# Patient Record
Sex: FEMALE | Race: WHITE | NOT HISPANIC OR LATINO | Employment: UNEMPLOYED | URBAN - METROPOLITAN AREA
[De-identification: names, ages, dates, MRNs, and addresses within clinical notes are randomized per-mention and may not be internally consistent; named-entity substitution may affect disease eponyms.]

---

## 2017-04-29 ENCOUNTER — HOSPITAL ENCOUNTER (EMERGENCY)
Facility: HOSPITAL | Age: 57
Discharge: HOME | End: 2017-04-29
Attending: EMERGENCY MEDICINE | Admitting: EMERGENCY MEDICINE

## 2017-04-29 VITALS
RESPIRATION RATE: 18 BRPM | WEIGHT: 163 LBS | HEART RATE: 79 BPM | BODY MASS INDEX: 28.88 KG/M2 | DIASTOLIC BLOOD PRESSURE: 58 MMHG | SYSTOLIC BLOOD PRESSURE: 98 MMHG | OXYGEN SATURATION: 100 % | HEIGHT: 63 IN | TEMPERATURE: 98.8 F

## 2017-04-29 DIAGNOSIS — I73.9 PERIPHERAL ARTERIAL DISEASE (HCC): Primary | ICD-10-CM

## 2017-04-29 DIAGNOSIS — I73.9 CLAUDICATION IN PERIPHERAL VASCULAR DISEASE (HCC): ICD-10-CM

## 2017-04-29 LAB
ALBUMIN SERPL-MCNC: 2.8 G/DL (ref 3.5–5.2)
ALBUMIN/GLOB SERPL: 0.6 G/DL
ALP SERPL-CCNC: 65 U/L (ref 40–129)
ALT SERPL W P-5'-P-CCNC: 28 U/L (ref 5–33)
ANION GAP SERPL CALCULATED.3IONS-SCNC: 10.3 MMOL/L
AST SERPL-CCNC: 70 U/L (ref 5–32)
BASOPHILS # BLD AUTO: 0.05 10*3/MM3 (ref 0–0.2)
BASOPHILS NFR BLD AUTO: 0.5 % (ref 0–2)
BILIRUB SERPL-MCNC: 0.7 MG/DL (ref 0.2–1.2)
BUN BLD-MCNC: 22 MG/DL (ref 6–20)
BUN/CREAT SERPL: 25 (ref 7–25)
CALCIUM SPEC-SCNC: 9 MG/DL (ref 8.6–10.5)
CHLORIDE SERPL-SCNC: 91 MMOL/L (ref 98–107)
CO2 SERPL-SCNC: 33.7 MMOL/L (ref 22–29)
CREAT BLD-MCNC: 0.88 MG/DL (ref 0.57–1)
DEPRECATED RDW RBC AUTO: 51 FL (ref 37–54)
EOSINOPHIL # BLD AUTO: 0.03 10*3/MM3 (ref 0.1–0.3)
EOSINOPHIL NFR BLD AUTO: 0.3 % (ref 0–4)
ERYTHROCYTE [DISTWIDTH] IN BLOOD BY AUTOMATED COUNT: 17.9 % (ref 11.5–14.5)
GFR SERPL CREATININE-BSD FRML MDRD: 66 ML/MIN/1.73
GLOBULIN UR ELPH-MCNC: 4.6 GM/DL
GLUCOSE BLD-MCNC: 169 MG/DL (ref 65–99)
HCT VFR BLD AUTO: 31 % (ref 37–47)
HGB BLD-MCNC: 9 G/DL (ref 12–16)
IMM GRANULOCYTES # BLD: 0.04 10*3/MM3 (ref 0–0.03)
IMM GRANULOCYTES NFR BLD: 0.4 % (ref 0–0.5)
LYMPHOCYTES # BLD AUTO: 1.77 10*3/MM3 (ref 0.6–4.8)
LYMPHOCYTES NFR BLD AUTO: 16.9 % (ref 20–45)
MCH RBC QN AUTO: 22.8 PG (ref 27–31)
MCHC RBC AUTO-ENTMCNC: 29 G/DL (ref 31–37)
MCV RBC AUTO: 78.5 FL (ref 81–99)
MONOCYTES # BLD AUTO: 0.88 10*3/MM3 (ref 0–1)
MONOCYTES NFR BLD AUTO: 8.4 % (ref 3–8)
NEUTROPHILS # BLD AUTO: 7.7 10*3/MM3 (ref 1.5–8.3)
NEUTROPHILS NFR BLD AUTO: 73.5 % (ref 45–70)
NRBC BLD MANUAL-RTO: 0 /100 WBC (ref 0–0)
PLATELET # BLD AUTO: 293 10*3/MM3 (ref 140–500)
PMV BLD AUTO: 10.7 FL (ref 7.4–10.4)
POTASSIUM BLD-SCNC: 4.4 MMOL/L (ref 3.5–5.2)
PROT SERPL-MCNC: 7.4 G/DL (ref 6–8.5)
RBC # BLD AUTO: 3.95 10*6/MM3 (ref 4.2–5.4)
SODIUM BLD-SCNC: 135 MMOL/L (ref 136–145)
WBC NRBC COR # BLD: 10.47 10*3/MM3 (ref 4.8–10.8)

## 2017-04-29 PROCEDURE — 93010 ELECTROCARDIOGRAM REPORT: CPT | Performed by: INTERNAL MEDICINE

## 2017-04-29 PROCEDURE — 99285 EMERGENCY DEPT VISIT HI MDM: CPT | Performed by: EMERGENCY MEDICINE

## 2017-04-29 PROCEDURE — 93005 ELECTROCARDIOGRAM TRACING: CPT | Performed by: EMERGENCY MEDICINE

## 2017-04-29 PROCEDURE — 99283 EMERGENCY DEPT VISIT LOW MDM: CPT

## 2017-04-29 PROCEDURE — 80053 COMPREHEN METABOLIC PANEL: CPT | Performed by: EMERGENCY MEDICINE

## 2017-04-29 PROCEDURE — 85025 COMPLETE CBC W/AUTO DIFF WBC: CPT | Performed by: EMERGENCY MEDICINE

## 2017-04-29 RX ORDER — SACCHAROMYCES BOULARDII 250 MG
250 CAPSULE ORAL 2 TIMES DAILY
COMMUNITY

## 2017-04-29 RX ORDER — ASPIRIN 81 MG/1
81 TABLET, CHEWABLE ORAL DAILY
COMMUNITY

## 2017-04-29 RX ORDER — LISINOPRIL 2.5 MG/1
2.5 TABLET ORAL DAILY
COMMUNITY
End: 2022-10-02 | Stop reason: HOSPADM

## 2017-04-29 RX ORDER — MULTIPLE VITAMINS W/ MINERALS TAB 9MG-400MCG
1 TAB ORAL DAILY
COMMUNITY

## 2017-04-29 RX ORDER — PANTOPRAZOLE SODIUM 40 MG/1
40 TABLET, DELAYED RELEASE ORAL DAILY
COMMUNITY

## 2017-04-29 RX ORDER — SODIUM CHLORIDE 0.9 % (FLUSH) 0.9 %
10 SYRINGE (ML) INJECTION AS NEEDED
Status: DISCONTINUED | OUTPATIENT
Start: 2017-04-29 | End: 2017-04-29 | Stop reason: HOSPADM

## 2017-04-29 RX ORDER — ESCITALOPRAM OXALATE 5 MG/1
5 TABLET ORAL DAILY
COMMUNITY
End: 2022-10-02 | Stop reason: HOSPADM

## 2017-04-29 RX ORDER — FUROSEMIDE 40 MG/1
40 TABLET ORAL 2 TIMES DAILY
COMMUNITY
End: 2022-10-02 | Stop reason: HOSPADM

## 2017-04-29 RX ORDER — DOCUSATE SODIUM 100 MG/1
100 CAPSULE, LIQUID FILLED ORAL 2 TIMES DAILY
COMMUNITY

## 2017-04-29 RX ORDER — CARVEDILOL 3.12 MG/1
3.12 TABLET ORAL 2 TIMES DAILY WITH MEALS
COMMUNITY

## 2017-04-29 RX ORDER — GABAPENTIN 400 MG/1
400 CAPSULE ORAL 3 TIMES DAILY
COMMUNITY
End: 2022-11-16

## 2017-04-29 RX ORDER — METOLAZONE 2.5 MG/1
2.5 TABLET ORAL DAILY
COMMUNITY
End: 2022-10-02 | Stop reason: HOSPADM

## 2017-04-29 RX ORDER — SENNOSIDES 8.6 MG
TABLET ORAL NIGHTLY
COMMUNITY

## 2017-04-29 RX ORDER — ONDANSETRON 4 MG/1
4 TABLET, FILM COATED ORAL EVERY 8 HOURS PRN
COMMUNITY

## 2017-04-29 RX ORDER — ATORVASTATIN CALCIUM 20 MG/1
20 TABLET, FILM COATED ORAL DAILY
COMMUNITY

## 2017-04-29 RX ORDER — OXYCODONE HYDROCHLORIDE AND ACETAMINOPHEN 5; 325 MG/1; MG/1
1 TABLET ORAL EVERY 6 HOURS PRN
COMMUNITY
End: 2022-10-19 | Stop reason: SDDI

## 2017-04-29 RX ORDER — POLYETHYLENE GLYCOL 3350 17 G/17G
17 POWDER, FOR SOLUTION ORAL DAILY
COMMUNITY
End: 2022-10-19 | Stop reason: SDDI

## 2017-04-29 RX ORDER — ACETAMINOPHEN 325 MG/1
650 TABLET ORAL EVERY 6 HOURS PRN
COMMUNITY

## 2022-08-13 ENCOUNTER — HOSPITAL ENCOUNTER (EMERGENCY)
Facility: HOSPITAL | Age: 62
Discharge: HOME | End: 2022-08-14
Attending: EMERGENCY MEDICINE | Admitting: EMERGENCY MEDICINE

## 2022-08-13 ENCOUNTER — APPOINTMENT (OUTPATIENT)
Dept: GENERAL RADIOLOGY | Facility: HOSPITAL | Age: 62
End: 2022-08-13

## 2022-08-13 VITALS
HEIGHT: 55 IN | SYSTOLIC BLOOD PRESSURE: 139 MMHG | BODY MASS INDEX: 39.11 KG/M2 | TEMPERATURE: 99.2 F | RESPIRATION RATE: 20 BRPM | OXYGEN SATURATION: 90 % | DIASTOLIC BLOOD PRESSURE: 75 MMHG | WEIGHT: 169 LBS | HEART RATE: 98 BPM

## 2022-08-13 DIAGNOSIS — U07.1 COVID-19: Primary | ICD-10-CM

## 2022-08-13 DIAGNOSIS — R53.1 WEAKNESS: ICD-10-CM

## 2022-08-13 LAB
ALBUMIN SERPL-MCNC: 3.5 G/DL (ref 3.5–5.2)
ALBUMIN/GLOB SERPL: 1 G/DL
ALP SERPL-CCNC: 92 U/L (ref 39–117)
ALT SERPL W P-5'-P-CCNC: 19 U/L (ref 1–33)
AMORPH URATE CRY URNS QL MICRO: ABNORMAL /HPF
ANION GAP SERPL CALCULATED.3IONS-SCNC: 9.5 MMOL/L (ref 5–15)
AST SERPL-CCNC: 30 U/L (ref 1–32)
BACTERIA UR QL AUTO: ABNORMAL /HPF
BASOPHILS # BLD AUTO: 0.1 10*3/MM3 (ref 0–0.2)
BASOPHILS NFR BLD AUTO: 0.9 % (ref 0–1.5)
BILIRUB SERPL-MCNC: 0.4 MG/DL (ref 0–1.2)
BILIRUB UR QL STRIP: NEGATIVE
BUN SERPL-MCNC: 16 MG/DL (ref 8–23)
BUN/CREAT SERPL: 15.7 (ref 7–25)
CALCIUM SPEC-SCNC: 9.4 MG/DL (ref 8.6–10.5)
CHLORIDE SERPL-SCNC: 100 MMOL/L (ref 98–107)
CLARITY UR: CLEAR
CO2 SERPL-SCNC: 25.5 MMOL/L (ref 22–29)
COLOR UR: YELLOW
CREAT SERPL-MCNC: 1.02 MG/DL (ref 0.57–1)
DEPRECATED RDW RBC AUTO: 50.4 FL (ref 37–54)
EGFRCR SERPLBLD CKD-EPI 2021: 62.7 ML/MIN/1.73
EOSINOPHIL # BLD AUTO: 0.05 10*3/MM3 (ref 0–0.4)
EOSINOPHIL NFR BLD AUTO: 0.4 % (ref 0.3–6.2)
ERYTHROCYTE [DISTWIDTH] IN BLOOD BY AUTOMATED COUNT: 14.6 % (ref 12.3–15.4)
FLUAV RNA RESP QL NAA+PROBE: NOT DETECTED
FLUBV RNA RESP QL NAA+PROBE: NOT DETECTED
GLOBULIN UR ELPH-MCNC: 3.5 GM/DL
GLUCOSE SERPL-MCNC: 193 MG/DL (ref 65–99)
GLUCOSE UR STRIP-MCNC: NEGATIVE MG/DL
HCT VFR BLD AUTO: 45.4 % (ref 34–46.6)
HGB BLD-MCNC: 14.5 G/DL (ref 12–15.9)
HGB UR QL STRIP.AUTO: ABNORMAL
HYALINE CASTS UR QL AUTO: ABNORMAL /LPF
IMM GRANULOCYTES # BLD AUTO: 0.08 10*3/MM3 (ref 0–0.05)
IMM GRANULOCYTES NFR BLD AUTO: 0.7 % (ref 0–0.5)
KETONES UR QL STRIP: NEGATIVE
LEUKOCYTE ESTERASE UR QL STRIP.AUTO: NEGATIVE
LYMPHOCYTES # BLD AUTO: 1.27 10*3/MM3 (ref 0.7–3.1)
LYMPHOCYTES NFR BLD AUTO: 10.9 % (ref 19.6–45.3)
MCH RBC QN AUTO: 29.9 PG (ref 26.6–33)
MCHC RBC AUTO-ENTMCNC: 31.9 G/DL (ref 31.5–35.7)
MCV RBC AUTO: 93.6 FL (ref 79–97)
MONOCYTES # BLD AUTO: 0.91 10*3/MM3 (ref 0.1–0.9)
MONOCYTES NFR BLD AUTO: 7.8 % (ref 5–12)
MUCOUS THREADS URNS QL MICRO: ABNORMAL /HPF
NEUTROPHILS NFR BLD AUTO: 79.3 % (ref 42.7–76)
NEUTROPHILS NFR BLD AUTO: 9.25 10*3/MM3 (ref 1.7–7)
NITRITE UR QL STRIP: NEGATIVE
NRBC BLD AUTO-RTO: 0 /100 WBC (ref 0–0.2)
PH UR STRIP.AUTO: 5.5 [PH] (ref 4.5–8)
PLATELET # BLD AUTO: 324 10*3/MM3 (ref 140–450)
PMV BLD AUTO: 10.6 FL (ref 6–12)
POTASSIUM SERPL-SCNC: 4.5 MMOL/L (ref 3.5–5.2)
PROT SERPL-MCNC: 7 G/DL (ref 6–8.5)
PROT UR QL STRIP: ABNORMAL
RBC # BLD AUTO: 4.85 10*6/MM3 (ref 3.77–5.28)
RBC # UR STRIP: ABNORMAL /HPF
REF LAB TEST METHOD: ABNORMAL
SARS-COV-2 RNA RESP QL NAA+PROBE: DETECTED
SODIUM SERPL-SCNC: 135 MMOL/L (ref 136–145)
SP GR UR STRIP: 1.02 (ref 1–1.03)
SQUAMOUS #/AREA URNS HPF: ABNORMAL /HPF
UROBILINOGEN UR QL STRIP: ABNORMAL
WBC # UR STRIP: ABNORMAL /HPF
WBC NRBC COR # BLD: 11.66 10*3/MM3 (ref 3.4–10.8)

## 2022-08-13 PROCEDURE — 25010000002 DEXAMETHASONE PER 1 MG: Performed by: EMERGENCY MEDICINE

## 2022-08-13 PROCEDURE — 80053 COMPREHEN METABOLIC PANEL: CPT | Performed by: EMERGENCY MEDICINE

## 2022-08-13 PROCEDURE — 81001 URINALYSIS AUTO W/SCOPE: CPT | Performed by: EMERGENCY MEDICINE

## 2022-08-13 PROCEDURE — P9612 CATHETERIZE FOR URINE SPEC: HCPCS

## 2022-08-13 PROCEDURE — 25010000002 ONDANSETRON PER 1 MG: Performed by: EMERGENCY MEDICINE

## 2022-08-13 PROCEDURE — 87636 SARSCOV2 & INF A&B AMP PRB: CPT | Performed by: EMERGENCY MEDICINE

## 2022-08-13 PROCEDURE — 96375 TX/PRO/DX INJ NEW DRUG ADDON: CPT

## 2022-08-13 PROCEDURE — 85025 COMPLETE CBC W/AUTO DIFF WBC: CPT | Performed by: EMERGENCY MEDICINE

## 2022-08-13 PROCEDURE — 96374 THER/PROPH/DIAG INJ IV PUSH: CPT

## 2022-08-13 PROCEDURE — 99283 EMERGENCY DEPT VISIT LOW MDM: CPT

## 2022-08-13 PROCEDURE — 71045 X-RAY EXAM CHEST 1 VIEW: CPT

## 2022-08-13 RX ORDER — BENZONATATE 100 MG/1
100 CAPSULE ORAL 2 TIMES DAILY PRN
Qty: 15 CAPSULE | Refills: 0 | Status: SHIPPED | OUTPATIENT
Start: 2022-08-13 | End: 2022-08-20

## 2022-08-13 RX ORDER — DEXAMETHASONE SODIUM PHOSPHATE 4 MG/ML
6 INJECTION, SOLUTION INTRA-ARTICULAR; INTRALESIONAL; INTRAMUSCULAR; INTRAVENOUS; SOFT TISSUE ONCE
Status: COMPLETED | OUTPATIENT
Start: 2022-08-13 | End: 2022-08-13

## 2022-08-13 RX ORDER — ONDANSETRON 4 MG/1
4 TABLET, ORALLY DISINTEGRATING ORAL EVERY 8 HOURS PRN
Qty: 20 TABLET | Refills: 0 | Status: SHIPPED | OUTPATIENT
Start: 2022-08-13 | End: 2022-08-20

## 2022-08-13 RX ORDER — DEXAMETHASONE 6 MG/1
6 TABLET ORAL
Qty: 5 TABLET | Refills: 0 | Status: SHIPPED | OUTPATIENT
Start: 2022-08-13 | End: 2022-08-18

## 2022-08-13 RX ORDER — ONDANSETRON 2 MG/ML
4 INJECTION INTRAMUSCULAR; INTRAVENOUS ONCE
Status: COMPLETED | OUTPATIENT
Start: 2022-08-13 | End: 2022-08-13

## 2022-08-13 RX ORDER — SODIUM CHLORIDE 0.9 % (FLUSH) 0.9 %
10 SYRINGE (ML) INJECTION AS NEEDED
Status: DISCONTINUED | OUTPATIENT
Start: 2022-08-13 | End: 2022-08-14 | Stop reason: HOSPADM

## 2022-08-13 RX ADMIN — SODIUM CHLORIDE 500 ML: 9 INJECTION, SOLUTION INTRAVENOUS at 21:59

## 2022-08-13 RX ADMIN — DEXAMETHASONE SODIUM PHOSPHATE 6 MG: 4 INJECTION, SOLUTION INTRAMUSCULAR; INTRAVENOUS at 23:38

## 2022-08-13 RX ADMIN — ONDANSETRON 4 MG: 2 INJECTION INTRAMUSCULAR; INTRAVENOUS at 21:58

## 2022-08-14 NOTE — ED PROVIDER NOTES
" EMERGENCY DEPARTMENT ENCOUNTER    Room Number:  01/01  Date seen:  8/13/2022  PCP: Winifred Cardenas MD  Historian: Patient      HPI:  Chief Complaint: COVID symptoms, weakness  A complete HPI/ROS/PMH/PSH/SH/FH are unobtainable due to: N/A  Context: Joanne Contreras is a 62 y.o. female who presents to the ED c/o multiple COVID symptoms and weakness.  She took a home COVID test today which ended up being positive.  Patient reports some fevers and chills and malaise with body aches and nausea and vomiting and congestion and generalized weakness.  She has multiple medical problems and her past medical history.  Today it was difficult for her to even get up and go to the restroom because of her generalized weakness.  Patient has not been vaccinated.  She tells me that she simply \"did not want to get the shot.\"            PAST MEDICAL HISTORY  Active Ambulatory Problems     Diagnosis Date Noted   • No Active Ambulatory Problems     Resolved Ambulatory Problems     Diagnosis Date Noted   • No Resolved Ambulatory Problems     Past Medical History:   Diagnosis Date   • Anemia    • Coronary artery disease    • Diabetes mellitus (HCC)    • Hyperlipidemia    • Hypertension    • Peripheral vascular disease, secondary (HCC)          PAST SURGICAL HISTORY  Past Surgical History:   Procedure Laterality Date   • BELOW KNEE AMPUTATION     • TOE SURGERY           FAMILY HISTORY  History reviewed. No pertinent family history.      SOCIAL HISTORY  Social History     Socioeconomic History   • Marital status:    Tobacco Use   • Smoking status: Former Smoker   • Smokeless tobacco: Never Used   Vaping Use   • Vaping Use: Never used   Substance and Sexual Activity   • Alcohol use: No   • Drug use: Defer   • Sexual activity: Defer         ALLERGIES  Patient has no known allergies.        REVIEW OF SYSTEMS  Review of Systems   Constitutional: Positive for activity change, appetite change, chills, fatigue and fever.   HENT: Positive for " congestion, sinus pressure and sore throat.    Eyes: Negative for pain and visual disturbance.   Respiratory: Positive for cough. Negative for shortness of breath.    Cardiovascular: Negative for chest pain.   Gastrointestinal: Positive for diarrhea and nausea. Negative for abdominal pain.   Genitourinary: Positive for flank pain. Negative for dysuria and urgency.   Musculoskeletal: Positive for back pain and myalgias.   Skin: Negative for color change.   Neurological: Positive for weakness. Negative for syncope and headaches.   All other systems reviewed and are negative.     PHYSICAL EXAM  ED Triage Vitals [08/13/22 2135]   Temp Heart Rate Resp BP SpO2   99.2 °F (37.3 °C) 96 20 142/91 92 %      Temp src Heart Rate Source Patient Position BP Location FiO2 (%)   -- -- -- -- --         GENERAL: Pleasant lady, lying calmly in the bed, no acute distress  HENT: nares patent, normocephalic and atraumatic, moist mucous membranes  EYES: no scleral icterus, EOMI, PERRL  CV: regular rhythm, normal rate, normal pulses  RESPIRATORY: normal effort, no stridor, lungs clear bilaterally, normal work of breathing.  ABDOMEN: soft, nontender throughout, no peritoneal features  MUSCULOSKELETAL: Leg amputation noted.  No signs of injury.  NEURO: alert, moves all extremities, follows commands, no focal deficits.  PSYCH:  calm, cooperative  SKIN: warm, dry    Vital signs and nursing notes reviewed.          LAB RESULTS  Recent Results (from the past 24 hour(s))   Comprehensive Metabolic Panel    Collection Time: 08/13/22  9:53 PM    Specimen: Blood   Result Value Ref Range    Glucose 193 (H) 65 - 99 mg/dL    BUN 16 8 - 23 mg/dL    Creatinine 1.02 (H) 0.57 - 1.00 mg/dL    Sodium 135 (L) 136 - 145 mmol/L    Potassium 4.5 3.5 - 5.2 mmol/L    Chloride 100 98 - 107 mmol/L    CO2 25.5 22.0 - 29.0 mmol/L    Calcium 9.4 8.6 - 10.5 mg/dL    Total Protein 7.0 6.0 - 8.5 g/dL    Albumin 3.50 3.50 - 5.20 g/dL    ALT (SGPT) 19 1 - 33 U/L    AST (SGOT)  30 1 - 32 U/L    Alkaline Phosphatase 92 39 - 117 U/L    Total Bilirubin 0.4 0.0 - 1.2 mg/dL    Globulin 3.5 gm/dL    A/G Ratio 1.0 g/dL    BUN/Creatinine Ratio 15.7 7.0 - 25.0    Anion Gap 9.5 5.0 - 15.0 mmol/L    eGFR 62.7 >60.0 mL/min/1.73   CBC Auto Differential    Collection Time: 08/13/22  9:53 PM    Specimen: Blood   Result Value Ref Range    WBC 11.66 (H) 3.40 - 10.80 10*3/mm3    RBC 4.85 3.77 - 5.28 10*6/mm3    Hemoglobin 14.5 12.0 - 15.9 g/dL    Hematocrit 45.4 34.0 - 46.6 %    MCV 93.6 79.0 - 97.0 fL    MCH 29.9 26.6 - 33.0 pg    MCHC 31.9 31.5 - 35.7 g/dL    RDW 14.6 12.3 - 15.4 %    RDW-SD 50.4 37.0 - 54.0 fl    MPV 10.6 6.0 - 12.0 fL    Platelets 324 140 - 450 10*3/mm3    Neutrophil % 79.3 (H) 42.7 - 76.0 %    Lymphocyte % 10.9 (L) 19.6 - 45.3 %    Monocyte % 7.8 5.0 - 12.0 %    Eosinophil % 0.4 0.3 - 6.2 %    Basophil % 0.9 0.0 - 1.5 %    Immature Grans % 0.7 (H) 0.0 - 0.5 %    Neutrophils, Absolute 9.25 (H) 1.70 - 7.00 10*3/mm3    Lymphocytes, Absolute 1.27 0.70 - 3.10 10*3/mm3    Monocytes, Absolute 0.91 (H) 0.10 - 0.90 10*3/mm3    Eosinophils, Absolute 0.05 0.00 - 0.40 10*3/mm3    Basophils, Absolute 0.10 0.00 - 0.20 10*3/mm3    Immature Grans, Absolute 0.08 (H) 0.00 - 0.05 10*3/mm3    nRBC 0.0 0.0 - 0.2 /100 WBC   COVID-19 and FLU A/B PCR - Swab, Nasopharynx    Collection Time: 08/13/22  9:55 PM    Specimen: Nasopharynx; Swab   Result Value Ref Range    COVID19 Detected (C) Not Detected - Ref. Range    Influenza A PCR Not Detected Not Detected    Influenza B PCR Not Detected Not Detected   Urinalysis With Culture If Indicated - Urine, Catheter    Collection Time: 08/13/22 10:22 PM    Specimen: Urine, Catheter   Result Value Ref Range    Color, UA Yellow Yellow, Straw    Appearance, UA Clear Clear    pH, UA 5.5 4.5 - 8.0    Specific Gravity, UA 1.017 1.003 - 1.030    Glucose, UA Negative Negative    Ketones, UA Negative Negative    Bilirubin, UA Negative Negative    Blood, UA Trace (A) Negative     Protein,  mg/dL (2+) (A) Negative    Leuk Esterase, UA Negative Negative    Nitrite, UA Negative Negative    Urobilinogen, UA 0.2 E.U./dL 0.2 - 1.0 E.U./dL   Urinalysis, Microscopic Only - Urine, Catheter    Collection Time: 08/13/22 10:22 PM    Specimen: Urine, Catheter   Result Value Ref Range    RBC, UA 3-5 (A) None Seen /HPF    WBC, UA 0-2 (A) None Seen /HPF    Bacteria, UA None Seen None Seen /HPF    Squamous Epithelial Cells, UA 3-6 (A) None Seen, 0-2 /HPF    Hyaline Casts, UA 0-2 None Seen /LPF    Amorphous Crystals, UA Small/1+ None Seen /HPF    Mucus, UA Moderate/2+ (A) None Seen, Trace /HPF    Methodology Manual Light Microscopy        Ordered the above labs and reviewed the results.        RADIOLOGY  XR Chest 1 View    Result Date: 8/13/2022  CR Chest 1 Vw INDICATION: Covid positive with fever and weakness COMPARISON:  None available. FINDINGS: Portable AP view(s) of the chest.  The heart and mediastinal contours are normal. The lungs are clear. No pneumothorax or pleural effusion.     No acute cardiopulmonary findings. Signer Name: Adonay Kinney MD  Signed: 8/13/2022 11:16 PM  Workstation Name: [id number]  [facility]      Ordered the above noted radiological studies. Reviewed by me in PACS.            PROCEDURES  Procedures            MEDICATIONS GIVEN IN ER  Medications   sodium chloride 0.9 % flush 10 mL (has no administration in time range)   sodium chloride 0.9 % bolus 500 mL (0 mL Intravenous Stopped 8/13/22 2229)   ondansetron (ZOFRAN) injection 4 mg (4 mg Intravenous Given 8/13/22 2158)                   MEDICAL DECISION MAKING, PROGRESS, and CONSULTS    All labs have been independently reviewed by me.  All radiology studies have been reviewed by me and discussed with radiologist dictating the report.   EKG's independently viewed and interpreted by me.  Discussion below represents my analysis of pertinent findings related to patient's condition, differential  "diagnosis, treatment plan and final disposition.          ED Course as of 08/13/22 2331   Sat Aug 13, 2022   2330 Patient found to have COVID-19 here.  Fortunately, she is well-hydrated.  Not vomiting.  Work of breathing is normal.  Oxygenation is normal on room air.  Unfortunately she does not qualify for Paxlovid since she is on Eliquis therapy.  There is no indication for inpatient admission at this time.  I will discharge her home to continue symptomatic management at home with instructions to follow-up at her PCPs office.  We will review with her the usual \"return to ER\" instructions for any worsening signs or symptoms prior to discharge. [EMELIA]      ED Course User Index  [EMELIA] Jesse Cole MD                DIAGNOSIS  Final diagnoses:   COVID-19   Weakness         DISPOSITION  Home            Latest Documented Vital Signs:  As of 23:31 EDT  BP- 139/75 HR- 97 Temp- 99.2 °F (37.3 °C) O2 sat- 90%        --    Please note that portions of this were completed with a voice recognition program.          Jesse Cole MD  08/13/22 2331    "

## 2022-08-14 NOTE — DISCHARGE INSTRUCTIONS
Rest as needed.  Drink plenty of fluids as tolerated.  Treat fevers and pain with Tylenol or ibuprofen every 8 hours as needed.  Please return to the emergency room for any worsening symptoms or concerns.

## 2022-09-14 ENCOUNTER — TELEPHONE (OUTPATIENT)
Dept: OBSTETRICS AND GYNECOLOGY | Facility: CLINIC | Age: 62
End: 2022-09-14

## 2022-09-14 NOTE — TELEPHONE ENCOUNTER
Jesi Spence is referring pt to us for intra-abdominal/pelvic swelling along with a mass and lump. Pt will be coming in as a new gyn. Please advise and I will schedule accordingly.

## 2022-09-15 NOTE — TELEPHONE ENCOUNTER
I agree with Dr VILLEDA. If you can get imaging then we can confirm the best place for her to be seen.

## 2022-09-15 NOTE — TELEPHONE ENCOUNTER
I can't see her imaging or reports in her chart, but at age 62 with a pelvic mass and ascites, she needs to see gyn onc ASAP. AKR

## 2022-09-19 ENCOUNTER — HOSPITAL ENCOUNTER (INPATIENT)
Facility: HOSPITAL | Age: 62
LOS: 13 days | Discharge: HOME | End: 2022-10-02
Attending: HOSPITALIST | Admitting: SURGERY

## 2022-09-19 ENCOUNTER — APPOINTMENT (OUTPATIENT)
Dept: CT IMAGING | Facility: HOSPITAL | Age: 62
End: 2022-09-19

## 2022-09-19 ENCOUNTER — HOSPITAL ENCOUNTER (EMERGENCY)
Facility: HOSPITAL | Age: 62
Discharge: ANOTHER HOSPITAL | End: 2022-09-19
Attending: EMERGENCY MEDICINE | Admitting: EMERGENCY MEDICINE

## 2022-09-19 VITALS
TEMPERATURE: 98.1 F | DIASTOLIC BLOOD PRESSURE: 94 MMHG | OXYGEN SATURATION: 98 % | SYSTOLIC BLOOD PRESSURE: 155 MMHG | HEART RATE: 89 BPM | WEIGHT: 190 LBS | BODY MASS INDEX: 45.81 KG/M2 | RESPIRATION RATE: 16 BRPM

## 2022-09-19 DIAGNOSIS — C78.6 PERITONEAL CARCINOMATOSIS: Primary | ICD-10-CM

## 2022-09-19 DIAGNOSIS — C57.00 FALLOPIAN TUBE CARCINOMA, UNSPECIFIED LATERALITY: ICD-10-CM

## 2022-09-19 DIAGNOSIS — K82.8 PORCELAIN GALLBLADDER: Primary | ICD-10-CM

## 2022-09-19 PROBLEM — R18.8 ASCITES: Status: ACTIVE | Noted: 2022-09-19

## 2022-09-19 LAB
ALBUMIN SERPL-MCNC: 3 G/DL (ref 3.5–5.2)
ALBUMIN/GLOB SERPL: 0.8 G/DL
ALP SERPL-CCNC: 77 U/L (ref 39–117)
ALT SERPL W P-5'-P-CCNC: 9 U/L (ref 1–33)
AMYLASE SERPL-CCNC: 103 U/L (ref 28–100)
ANION GAP SERPL CALCULATED.3IONS-SCNC: 11.2 MMOL/L (ref 5–15)
AST SERPL-CCNC: 17 U/L (ref 1–32)
BACTERIA UR QL AUTO: ABNORMAL /HPF
BASOPHILS # BLD AUTO: 0.07 10*3/MM3 (ref 0–0.2)
BASOPHILS NFR BLD AUTO: 0.6 % (ref 0–1.5)
BILIRUB SERPL-MCNC: 0.3 MG/DL (ref 0–1.2)
BILIRUB UR QL STRIP: ABNORMAL
BUN SERPL-MCNC: 14 MG/DL (ref 8–23)
BUN/CREAT SERPL: 14 (ref 7–25)
CALCIUM SPEC-SCNC: 9.5 MG/DL (ref 8.6–10.5)
CANCER AG125 SERPL QL: 6024 U/ML (ref 0–38.1)
CANCER AG19-9 SERPL-ACNC: <2 U/ML
CEA SERPL-MCNC: 3.45 NG/ML
CHLORIDE SERPL-SCNC: 98 MMOL/L (ref 98–107)
CLARITY UR: ABNORMAL
CO2 SERPL-SCNC: 25.8 MMOL/L (ref 22–29)
COLOR UR: YELLOW
CREAT SERPL-MCNC: 1 MG/DL (ref 0.57–1)
D-LACTATE SERPL-SCNC: 1.6 MMOL/L (ref 0.5–2)
DEPRECATED RDW RBC AUTO: 48.6 FL (ref 37–54)
EGFRCR SERPLBLD CKD-EPI 2021: 64.2 ML/MIN/1.73
EOSINOPHIL # BLD AUTO: 0.16 10*3/MM3 (ref 0–0.4)
EOSINOPHIL NFR BLD AUTO: 1.3 % (ref 0.3–6.2)
ERYTHROCYTE [DISTWIDTH] IN BLOOD BY AUTOMATED COUNT: 14.7 % (ref 12.3–15.4)
GLOBULIN UR ELPH-MCNC: 3.9 GM/DL
GLUCOSE BLDC GLUCOMTR-MCNC: 258 MG/DL (ref 70–130)
GLUCOSE BLDC GLUCOMTR-MCNC: 273 MG/DL (ref 70–130)
GLUCOSE BLDC GLUCOMTR-MCNC: 274 MG/DL (ref 70–130)
GLUCOSE SERPL-MCNC: 366 MG/DL (ref 65–99)
GLUCOSE UR STRIP-MCNC: ABNORMAL MG/DL
HBA1C MFR BLD: 8.4 % (ref 4.8–5.6)
HCT VFR BLD AUTO: 44.6 % (ref 34–46.6)
HGB BLD-MCNC: 14.1 G/DL (ref 12–15.9)
HGB UR QL STRIP.AUTO: NEGATIVE
HOLD SPECIMEN: NORMAL
HOLD SPECIMEN: NORMAL
HYALINE CASTS UR QL AUTO: ABNORMAL /LPF
IMM GRANULOCYTES # BLD AUTO: 0.09 10*3/MM3 (ref 0–0.05)
IMM GRANULOCYTES NFR BLD AUTO: 0.7 % (ref 0–0.5)
KETONES UR QL STRIP: ABNORMAL
LDH SERPL-CCNC: 266 U/L (ref 135–214)
LEUKOCYTE ESTERASE UR QL STRIP.AUTO: NEGATIVE
LIPASE SERPL-CCNC: 25 U/L (ref 13–60)
LYMPHOCYTES # BLD AUTO: 1.68 10*3/MM3 (ref 0.7–3.1)
LYMPHOCYTES NFR BLD AUTO: 13.8 % (ref 19.6–45.3)
MCH RBC QN AUTO: 28.6 PG (ref 26.6–33)
MCHC RBC AUTO-ENTMCNC: 31.6 G/DL (ref 31.5–35.7)
MCV RBC AUTO: 90.5 FL (ref 79–97)
MONOCYTES # BLD AUTO: 1.23 10*3/MM3 (ref 0.1–0.9)
MONOCYTES NFR BLD AUTO: 10.1 % (ref 5–12)
NEUTROPHILS NFR BLD AUTO: 73.5 % (ref 42.7–76)
NEUTROPHILS NFR BLD AUTO: 8.97 10*3/MM3 (ref 1.7–7)
NITRITE UR QL STRIP: NEGATIVE
NRBC BLD AUTO-RTO: 0 /100 WBC (ref 0–0.2)
PH UR STRIP.AUTO: 5.5 [PH] (ref 4.5–8)
PLATELET # BLD AUTO: 407 10*3/MM3 (ref 140–450)
PMV BLD AUTO: 10.5 FL (ref 6–12)
POTASSIUM SERPL-SCNC: 4.3 MMOL/L (ref 3.5–5.2)
PROT SERPL-MCNC: 6.9 G/DL (ref 6–8.5)
PROT UR QL STRIP: ABNORMAL
RBC # BLD AUTO: 4.93 10*6/MM3 (ref 3.77–5.28)
RBC # UR STRIP: ABNORMAL /HPF
REF LAB TEST METHOD: ABNORMAL
SODIUM SERPL-SCNC: 135 MMOL/L (ref 136–145)
SP GR UR STRIP: 1.03 (ref 1–1.03)
SQUAMOUS #/AREA URNS HPF: ABNORMAL /HPF
UROBILINOGEN UR QL STRIP: ABNORMAL
WBC # UR STRIP: ABNORMAL /HPF
WBC NRBC COR # BLD: 12.2 10*3/MM3 (ref 3.4–10.8)
WHOLE BLOOD HOLD COAG: NORMAL
WHOLE BLOOD HOLD SPECIMEN: NORMAL

## 2022-09-19 PROCEDURE — 83036 HEMOGLOBIN GLYCOSYLATED A1C: CPT | Performed by: HOSPITALIST

## 2022-09-19 PROCEDURE — 85025 COMPLETE CBC W/AUTO DIFF WBC: CPT | Performed by: EMERGENCY MEDICINE

## 2022-09-19 PROCEDURE — 36415 COLL VENOUS BLD VENIPUNCTURE: CPT

## 2022-09-19 PROCEDURE — 86304 IMMUNOASSAY TUMOR CA 125: CPT | Performed by: HOSPITALIST

## 2022-09-19 PROCEDURE — 82378 CARCINOEMBRYONIC ANTIGEN: CPT | Performed by: HOSPITALIST

## 2022-09-19 PROCEDURE — 63710000001 INSULIN LISPRO (HUMAN) PER 5 UNITS: Performed by: HOSPITALIST

## 2022-09-19 PROCEDURE — 83605 ASSAY OF LACTIC ACID: CPT | Performed by: EMERGENCY MEDICINE

## 2022-09-19 PROCEDURE — 25010000002 DICYCLOMINE PER 20 MG: Performed by: EMERGENCY MEDICINE

## 2022-09-19 PROCEDURE — 82150 ASSAY OF AMYLASE: CPT | Performed by: EMERGENCY MEDICINE

## 2022-09-19 PROCEDURE — 99283 EMERGENCY DEPT VISIT LOW MDM: CPT

## 2022-09-19 PROCEDURE — 82962 GLUCOSE BLOOD TEST: CPT

## 2022-09-19 PROCEDURE — 0 IOPAMIDOL PER 1 ML: Performed by: EMERGENCY MEDICINE

## 2022-09-19 PROCEDURE — 96372 THER/PROPH/DIAG INJ SC/IM: CPT

## 2022-09-19 PROCEDURE — 83690 ASSAY OF LIPASE: CPT | Performed by: EMERGENCY MEDICINE

## 2022-09-19 PROCEDURE — 83615 LACTATE (LD) (LDH) ENZYME: CPT | Performed by: HOSPITALIST

## 2022-09-19 PROCEDURE — 86301 IMMUNOASSAY TUMOR CA 19-9: CPT | Performed by: HOSPITALIST

## 2022-09-19 PROCEDURE — 81001 URINALYSIS AUTO W/SCOPE: CPT | Performed by: EMERGENCY MEDICINE

## 2022-09-19 PROCEDURE — 80053 COMPREHEN METABOLIC PANEL: CPT | Performed by: EMERGENCY MEDICINE

## 2022-09-19 PROCEDURE — 74177 CT ABD & PELVIS W/CONTRAST: CPT

## 2022-09-19 PROCEDURE — 63710000001 INSULIN REGULAR HUMAN PER 5 UNITS: Performed by: EMERGENCY MEDICINE

## 2022-09-19 RX ORDER — DEXTROSE MONOHYDRATE 25 G/50ML
25 INJECTION, SOLUTION INTRAVENOUS
Status: DISCONTINUED | OUTPATIENT
Start: 2022-09-19 | End: 2022-10-02 | Stop reason: HOSPADM

## 2022-09-19 RX ORDER — SODIUM CHLORIDE 0.9 % (FLUSH) 0.9 %
10 SYRINGE (ML) INJECTION AS NEEDED
Status: DISCONTINUED | OUTPATIENT
Start: 2022-09-19 | End: 2022-10-02 | Stop reason: HOSPADM

## 2022-09-19 RX ORDER — ATORVASTATIN CALCIUM 20 MG/1
20 TABLET, FILM COATED ORAL DAILY
Status: DISCONTINUED | OUTPATIENT
Start: 2022-09-19 | End: 2022-10-02 | Stop reason: HOSPADM

## 2022-09-19 RX ORDER — NICOTINE POLACRILEX 4 MG
15 LOZENGE BUCCAL
Status: DISCONTINUED | OUTPATIENT
Start: 2022-09-19 | End: 2022-10-02 | Stop reason: HOSPADM

## 2022-09-19 RX ORDER — ASPIRIN 81 MG/1
81 TABLET, CHEWABLE ORAL DAILY
Status: DISCONTINUED | OUTPATIENT
Start: 2022-09-19 | End: 2022-10-02 | Stop reason: HOSPADM

## 2022-09-19 RX ORDER — ONDANSETRON 4 MG/1
4 TABLET, FILM COATED ORAL EVERY 6 HOURS PRN
Status: DISCONTINUED | OUTPATIENT
Start: 2022-09-19 | End: 2022-10-02 | Stop reason: HOSPADM

## 2022-09-19 RX ORDER — SODIUM CHLORIDE 0.9 % (FLUSH) 0.9 %
10 SYRINGE (ML) INJECTION AS NEEDED
Status: DISCONTINUED | OUTPATIENT
Start: 2022-09-19 | End: 2022-09-19 | Stop reason: HOSPADM

## 2022-09-19 RX ORDER — HYDROCODONE BITARTRATE AND ACETAMINOPHEN 5; 325 MG/1; MG/1
1 TABLET ORAL EVERY 4 HOURS PRN
Status: DISPENSED | OUTPATIENT
Start: 2022-09-19 | End: 2022-09-26

## 2022-09-19 RX ORDER — ACETAMINOPHEN 160 MG/5ML
650 SOLUTION ORAL EVERY 4 HOURS PRN
Status: DISCONTINUED | OUTPATIENT
Start: 2022-09-19 | End: 2022-10-02 | Stop reason: HOSPADM

## 2022-09-19 RX ORDER — ESCITALOPRAM OXALATE 5 MG/1
5 TABLET ORAL DAILY
Status: DISCONTINUED | OUTPATIENT
Start: 2022-09-19 | End: 2022-09-24

## 2022-09-19 RX ORDER — SODIUM CHLORIDE 0.9 % (FLUSH) 0.9 %
10 SYRINGE (ML) INJECTION EVERY 12 HOURS SCHEDULED
Status: DISCONTINUED | OUTPATIENT
Start: 2022-09-19 | End: 2022-10-02 | Stop reason: HOSPADM

## 2022-09-19 RX ORDER — ONDANSETRON 2 MG/ML
4 INJECTION INTRAMUSCULAR; INTRAVENOUS EVERY 6 HOURS PRN
Status: DISCONTINUED | OUTPATIENT
Start: 2022-09-19 | End: 2022-10-02 | Stop reason: HOSPADM

## 2022-09-19 RX ORDER — DICYCLOMINE HYDROCHLORIDE 10 MG/ML
20 INJECTION INTRAMUSCULAR ONCE
Status: COMPLETED | OUTPATIENT
Start: 2022-09-19 | End: 2022-09-19

## 2022-09-19 RX ORDER — PANTOPRAZOLE SODIUM 40 MG/1
40 TABLET, DELAYED RELEASE ORAL DAILY
Status: DISCONTINUED | OUTPATIENT
Start: 2022-09-19 | End: 2022-10-02 | Stop reason: HOSPADM

## 2022-09-19 RX ORDER — INSULIN LISPRO 100 [IU]/ML
4 INJECTION, SOLUTION INTRAVENOUS; SUBCUTANEOUS
Status: DISCONTINUED | OUTPATIENT
Start: 2022-09-19 | End: 2022-10-02 | Stop reason: HOSPADM

## 2022-09-19 RX ORDER — SENNA PLUS 8.6 MG/1
2 TABLET ORAL NIGHTLY
Status: DISCONTINUED | OUTPATIENT
Start: 2022-09-19 | End: 2022-10-02 | Stop reason: HOSPADM

## 2022-09-19 RX ORDER — ACETAMINOPHEN 325 MG/1
650 TABLET ORAL EVERY 4 HOURS PRN
Status: DISCONTINUED | OUTPATIENT
Start: 2022-09-19 | End: 2022-10-02 | Stop reason: HOSPADM

## 2022-09-19 RX ORDER — INSULIN LISPRO 100 [IU]/ML
0-7 INJECTION, SOLUTION INTRAVENOUS; SUBCUTANEOUS
Status: DISCONTINUED | OUTPATIENT
Start: 2022-09-19 | End: 2022-10-02 | Stop reason: HOSPADM

## 2022-09-19 RX ORDER — ACETAMINOPHEN 650 MG/1
650 SUPPOSITORY RECTAL EVERY 4 HOURS PRN
Status: DISCONTINUED | OUTPATIENT
Start: 2022-09-19 | End: 2022-10-02 | Stop reason: HOSPADM

## 2022-09-19 RX ORDER — NALOXONE HCL 0.4 MG/ML
0.4 VIAL (ML) INJECTION
Status: DISCONTINUED | OUTPATIENT
Start: 2022-09-19 | End: 2022-10-02 | Stop reason: HOSPADM

## 2022-09-19 RX ORDER — HYDROMORPHONE HYDROCHLORIDE 1 MG/ML
0.5 INJECTION, SOLUTION INTRAMUSCULAR; INTRAVENOUS; SUBCUTANEOUS
Status: ACTIVE | OUTPATIENT
Start: 2022-09-19 | End: 2022-09-26

## 2022-09-19 RX ADMIN — PANTOPRAZOLE SODIUM 40 MG: 40 TABLET, DELAYED RELEASE ORAL at 17:08

## 2022-09-19 RX ADMIN — HUMAN INSULIN 6 UNITS: 100 INJECTION, SOLUTION SUBCUTANEOUS at 04:20

## 2022-09-19 RX ADMIN — HYDROCODONE BITARTRATE AND ACETAMINOPHEN 1 TABLET: 5; 325 TABLET ORAL at 17:09

## 2022-09-19 RX ADMIN — SENNOSIDES 2 TABLET: 8.6 TABLET, FILM COATED ORAL at 20:54

## 2022-09-19 RX ADMIN — ASPIRIN 81 MG: 81 TABLET, CHEWABLE ORAL at 17:08

## 2022-09-19 RX ADMIN — INSULIN GLARGINE-YFGN 20 UNITS: 100 INJECTION, SOLUTION SUBCUTANEOUS at 20:54

## 2022-09-19 RX ADMIN — HYDROCODONE BITARTRATE AND ACETAMINOPHEN 1 TABLET: 5; 325 TABLET ORAL at 22:32

## 2022-09-19 RX ADMIN — IOPAMIDOL 100 ML: 755 INJECTION, SOLUTION INTRAVENOUS at 05:02

## 2022-09-19 RX ADMIN — INSULIN LISPRO 4 UNITS: 100 INJECTION, SOLUTION INTRAVENOUS; SUBCUTANEOUS at 17:14

## 2022-09-19 RX ADMIN — Medication 10 ML: at 20:55

## 2022-09-19 RX ADMIN — DICYCLOMINE HYDROCHLORIDE 20 MG: 20 INJECTION, SOLUTION INTRAMUSCULAR at 03:05

## 2022-09-19 RX ADMIN — ATORVASTATIN CALCIUM 20 MG: 20 TABLET, FILM COATED ORAL at 17:08

## 2022-09-19 NOTE — TELEPHONE ENCOUNTER
I have called [facility] office to get medical records three times with no answer and have left voicemails with no call back. It looks like pt had a CT scan done today and the imaging is in her chart.

## 2022-09-19 NOTE — ED PROVIDER NOTES
"Subjective   History of Present Illness  Joanne Contreras is a 62-year-old white female who present secondary to abdominal pain.  Patient states that she has been experiencing abdominal pain for a month or more.  She has been seen at [facility] for the same symptoms.  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  Patient's PCP is reportedly referring her to a physician here at [facility].  However she does not know the name of this physician or their specialty.  Patient is has not heard anything from her PCP in a week.  Pain is worse tonight.  Patient states that her belly is \"swollen and feels like is going to pop\".  Associated nausea with 1 episode of vomiting in the past 24 hours.  Subjective fever.  No chills.  Patient presents for evaluation.    History provided by:  Patient      Review of Systems   Constitutional: Positive for fever (subjective).   HENT: Negative.  Negative for rhinorrhea.    Eyes: Negative.  Negative for redness.   Respiratory: Negative for cough.    Cardiovascular: Negative for chest pain.   Gastrointestinal: Positive for abdominal pain, nausea and vomiting.   Endocrine: Negative.    Genitourinary: Negative.  Negative for difficulty urinating.   Musculoskeletal: Negative.  Negative for back pain.   Skin: Negative.  Negative for color change.   Neurological: Negative.  Negative for syncope.   Hematological: Negative.    Psychiatric/Behavioral: Negative.    All other systems reviewed and are negative.      Past Medical History:   Diagnosis Date   • Anemia    • Coronary artery disease    • Diabetes mellitus (HCC)    • Hyperlipidemia    • Hypertension    • Peripheral vascular disease, secondary (HCC)        Allergies   Allergen Reactions   • Beta Adrenergic Blockers Other (See Comments)     Make PAD pain / claudication worse       Past Surgical History:   Procedure Laterality Date   • BELOW " KNEE AMPUTATION     • TOE SURGERY         History reviewed. No pertinent family history.    Social History     Socioeconomic History   • Marital status:    Tobacco Use   • Smoking status: Former Smoker   • Smokeless tobacco: Never Used   Vaping Use   • Vaping Use: Never used   Substance and Sexual Activity   • Alcohol use: No   • Drug use: Defer   • Sexual activity: Defer           Objective   Physical Exam  Vitals and nursing note reviewed.   Constitutional:       General: She is not in acute distress.     Appearance: Normal appearance. She is well-developed. She is not ill-appearing, toxic-appearing or diaphoretic.      Comments: 62-year-old white female laying in bed.  Patient appears chronically ill and in poor health for age.  Vital signs notable for heart rate of 101 and blood pressure 186/92.  Patient is afebrile.  Normal respiratory rate normal room air saturations.   HENT:      Head: Normocephalic and atraumatic.      Right Ear: Tympanic membrane, ear canal and external ear normal.      Left Ear: Tympanic membrane, ear canal and external ear normal.      Nose: Nose normal.      Mouth/Throat:      Mouth: Mucous membranes are moist.      Pharynx: Oropharynx is clear.   Eyes:      Extraocular Movements: Extraocular movements intact.      Conjunctiva/sclera: Conjunctivae normal.      Pupils: Pupils are equal, round, and reactive to light.   Cardiovascular:      Rate and Rhythm: Normal rate and regular rhythm.      Pulses: Normal pulses.      Heart sounds: Normal heart sounds. No murmur heard.    No friction rub. No gallop.   Pulmonary:      Effort: Pulmonary effort is normal.      Breath sounds: Normal breath sounds.   Abdominal:      General: Bowel sounds are normal. There is distension.      Palpations: Abdomen is soft. There is no mass or pulsatile mass.      Tenderness: There is generalized abdominal tenderness. There is no guarding or rebound. Negative signs include Ochoa's sign, Rovsing's sign and  McBurney's sign.      Hernia: No hernia is present.   Musculoskeletal:         General: Normal range of motion.      Cervical back: Normal range of motion and neck supple.      Right Lower Extremity: Right leg is amputated below knee.      Left Lower Extremity: Left leg is amputated above knee.   Skin:     General: Skin is warm and dry.      Capillary Refill: Capillary refill takes less than 2 seconds.   Neurological:      General: No focal deficit present.      Mental Status: She is alert and oriented to person, place, and time.      Deep Tendon Reflexes: Reflexes are normal and symmetric.   Psychiatric:         Mood and Affect: Mood normal.         Behavior: Behavior normal.         Procedures           ED Course  ED Course as of 09/23/22 0756   Mon Sep 19, 2022   0249 Obtaining full set of labs as well as CT with IV contrast.  Giving Bentyl for pain. [SS]   0346 WBC(!): 12.20 [SS]   0347 Neutrophils Absolute(!): 8.97 [SS]   0347 Monocytes Absolute(!): 1.23 [SS]   0347 Immature Grans, Absolute(!): 0.09 [SS]   0347 Glucose(!): 366 [SS]   0347 Amylase(!): 103 [SS]   0347 Lipase: 25 [SS]   0347 Glucose(!): 250 mg/dL (1+)  Lab work notable for leukocytosis of 12.2K with elevation of absolute neutrophils, absolute monocytes and immature granulocytes.  CMP notable for blood sugar of 366.  Amylase elevated at 103 with normal lipase.  Lactic acid unremarkable.  UA notable for 250 mg/Anh glucose.  Nitrite and leukocyte negative.  Giving insulin for hyperglycemia. [SS]   0411 Urine sample contaminated with epithelial cells.  No convincing evidence of UTI.  Awaiting CT. [SS]   0644 CT shows porcelain gallbladder, fluid which is partially loculated, thickening of the peritoneum and abnormal ovaries.  This is concerning for peritoneal carcinomatosis.  I discussed this case with Dr. RT Reyes as well as Abby Jurado.  Both feel patient should be transferred to the hospital with gynecology/oncology specialist.  I have called  the [facility].  Awaiting return.  I informed patient of all results, concerns about possible intra-abdominal cancer and need for transfer to a facility with GYN/ONC.  Patient knowledges understanding. [SS]   2019 Patient has been accepted by Dr. Suazo-hospitalist at [facility].  He will consult GYN/ONC.  Patient informed of the transfer to [facility]. [SS]      ED Course User Index  [SS] Yahir Jeter MD      Labs Reviewed   COMPREHENSIVE METABOLIC PANEL - Abnormal; Notable for the following components:       Result Value    Glucose 366 (*)     Sodium 135 (*)     Albumin 3.00 (*)     All other components within normal limits    Narrative:     GFR Normal >60  Chronic Kidney Disease <60  Kidney Failure <15     URINALYSIS W/ MICROSCOPIC IF INDICATED (NO CULTURE) - Abnormal; Notable for the following components:    Appearance, UA Cloudy (*)     Glucose,  mg/dL (1+) (*)     Ketones, UA Trace (*)     Bilirubin, UA Small (1+) (*)     Protein,  mg/dL (2+) (*)     All other components within normal limits   CBC WITH AUTO DIFFERENTIAL - Abnormal; Notable for the following components:    WBC 12.20 (*)     Lymphocyte % 13.8 (*)     Immature Grans % 0.7 (*)     Neutrophils, Absolute 8.97 (*)     Monocytes, Absolute 1.23 (*)     Immature Grans, Absolute 0.09 (*)     All other components within normal limits   AMYLASE - Abnormal; Notable for the following components:    Amylase 103 (*)     All other components within normal limits   URINALYSIS, MICROSCOPIC ONLY - Abnormal; Notable for the following components:    RBC, UA 0-2 (*)     WBC, UA 6-12 (*)     Bacteria, UA Trace (*)     Squamous Epithelial Cells, UA 3-6 (*)     All other components within normal limits   POCT GLUCOSE FINGERSTICK - Abnormal; Notable for the following components:    Glucose 258 (*)     All other components within normal limits   LIPASE - Normal   LACTIC ACID, PLASMA - Normal   RAINBOW DRAW    Narrative:     The following  orders were created for panel order [place] Draw.  Procedure                               Abnormality         Status                     ---------                               -----------         ------                     Green Top (Gel)[[id number]]                                  Final result               Lavender Top[[id number]]                                     Final result               Gold Top - SST[[id number]]                                   Final result               Light Blue Top[[id number]]                                   Final result                 Please view results for these tests on the individual orders.   CBC AND DIFFERENTIAL    Narrative:     The following orders were created for panel order CBC & Differential.  Procedure                               Abnormality         Status                     ---------                               -----------         ------                     CBC Auto Differential[[id number]]        Abnormal            Final result                 Please view results for these tests on the individual orders.   GREEN TOP   LAVENDER TOP   GOLD TOP - SST   LIGHT BLUE TOP     US Pelvis Complete    Result Date: 9/21/2022  Narrative: US PELVIS COMPLETE-  09/21/2022  HISTORY: Elevated . Malignant ascites.  This study is limited as only transabdominal imaging was performed due to patient's body habitus. Uterus appears slightly heterogeneous in echotexture with no thickening of the endometrium. The endometrial stripe measures 6.0 mm.  Right ovary measures approximately 6.8 cm x 5.2 cm x 4.9 cm and appears slightly heterogeneous. A dominant left adnexal mass is seen.  Right ovary measures 5.5 cm x 6.9 cm by 5.2 cm and appears slightly heterogeneous. No dominant right adnexal mass is seen.  There is vascular flow in both ovaries.  No significant free fluid is seen.      Impression: 1. Limited study as only transabdominal imaging could be performed and images were  somewhat degraded by patient's body habitus. 2. The bilateral ovaries appear slightly enlarged and heterogeneous but no dominant adnexal masses are seen. 3. No endometrial thickening is seen. 4. No free fluid is seen. 5. There is no evidence of ovarian torsion.  This report was finalized on 9/21/2022 4:39 PM by Dr. Yunior Boogie M.D.      CT Abdomen Pelvis With Contrast    Result Date: 9/19/2022  Narrative: CT Abdomen Pelvis W INDICATION: Acute abdominal pain TECHNIQUE: CT of the abdomen and pelvis with 100 cc of Omnipaque 300 IV contrast. Coronal and sagittal reconstructions were obtained.  Radiation dose reduction techniques included automated exposure control or exposure modulation based on body size. Count of known CT and cardiac nuc med studies performed in previous 12 months: 0. COMPARISON: None available. FINDINGS: Abdomen: There is a small left pleural effusion and a trace effusion on the right. In the abdomen there is a moderately large volume of ascites. The peritoneum is mildly thickened and nodular in the ascitic fluid appears to be at least partially loculated. The liver, spleen and pancreas show no acute findings. There is generalized pancreatic atrophy. The biliary tree is nondilated. There is a porcelain gallbladder. Kidneys and adrenals are unremarkable. No evidence of retroperitoneal adenopathy. No distended bowel loops. Pelvis: Both ovaries are enlarged with complex cysts and thickened septations. No pelvic adenopathy is noted.     Impression: Findings suspicious for peritoneal carcinomatosis with ascites and metastatic disease to both ovaries. Recommend diagnostic paracentesis for cytology. Also noted is a porcelain gallbladder and pancreatic atrophy. Signer Name: Jese Nunez MD  Signed: 9/19/2022 5:12 AM  Workstation Name: [id number]  [facility] Paracentesis    Result Date: 9/20/2022  Narrative: IMAGE GUIDED PARACENTESIS  HISTORY:  suspected malignant  ascites  TECHNIQUE: Informed consent was obtained and documented. The patient was placed in a supine position. After preparatory ultrasound scan and image recording, the left lower quadrant was prepped and draped in the usual aseptic manner. 1% lidocaine was infiltrated at the designated puncture site. A 5 Bulgarian paracentesis catheter was then inserted into the ascites using trocar technique. A total of 4.2 L fluid were drained with specimen sent to lab. The catheter was then removed. Manual pressure was held. Sterile dressing was applied. There were no immediate complications. All elements of maximal sterile technique were followed.       Impression: Successful ultrasound-guided paracentesis.  This report was finalized on 9/20/2022 12:09 PM by Dr. Tai Burger M.D.      My differential diagnosis for abdominal pain includes but is not limited to:  Gastritis, gastroenteritis, peptic ulcer disease, GERD, esophageal perforation, acute appendicitis, mesenteric adenitis, Meckel’s diverticulum, epiploic appendagitis, diverticulitis, colon cancer, ulcerative colitis, Crohn’s disease, intussusception, small bowel obstruction, adhesions, ischemic bowel, perforated viscus, ileus, obstipation, biliary colic, cholecystitis, cholelithiasis, Chris-Alexis John, hepatitis, pancreatitis, common bile duct obstruction, cholangitis, bile leak, splenic trauma, splenic rupture, splenic infarction, splenic abscess, abdominal wall hematoma, abdominal wall abscess, intra-abdominal abscess, ascites, spontaneous bacterial peritonitis, hernia, UTI, cystitis, prostatitis, ureterolithiasis, urinary obstruction, AAA, myocardial infarction, pneumonia, cancer, porphyria, DKA, medications, sickle cell, viral syndrome, zoster                                       MDM    Final diagnoses:   Peritoneal carcinomatosis (HCC)       ED Disposition  ED Disposition     ED Disposition   Transfer to Another Facility     Condition   --    Comment   [facility]  [facility]-hospitalist service             No follow-up provider specified.       Medication List      No changes were made to your prescriptions during this visit.          Yahir Jeter MD  09/23/22 0753

## 2022-09-20 ENCOUNTER — APPOINTMENT (OUTPATIENT)
Dept: ULTRASOUND IMAGING | Facility: HOSPITAL | Age: 62
End: 2022-09-20

## 2022-09-20 PROBLEM — R97.1 ELEVATED CANCER ANTIGEN 125 (CA-125): Status: ACTIVE | Noted: 2022-09-20

## 2022-09-20 LAB
ALBUMIN FLD-MCNC: 2.1 G/DL
ALBUMIN SERPL-MCNC: 2.3 G/DL (ref 3.5–5.2)
ALBUMIN/GLOB SERPL: 0.6 G/DL
ALP SERPL-CCNC: 64 U/L (ref 39–117)
ALT SERPL W P-5'-P-CCNC: 9 U/L (ref 1–33)
AMYLASE FLD-CCNC: 128 U/L
ANION GAP SERPL CALCULATED.3IONS-SCNC: 11.6 MMOL/L (ref 5–15)
APPEARANCE FLD: ABNORMAL
APTT PPP: 40.1 SECONDS (ref 22.7–35.4)
AST SERPL-CCNC: 18 U/L (ref 1–32)
BASOPHILS # BLD AUTO: 0.07 10*3/MM3 (ref 0–0.2)
BASOPHILS NFR BLD AUTO: 0.8 % (ref 0–1.5)
BILIRUB SERPL-MCNC: 0.3 MG/DL (ref 0–1.2)
BUN SERPL-MCNC: 16 MG/DL (ref 8–23)
BUN/CREAT SERPL: 18.2 (ref 7–25)
CALCIUM SPEC-SCNC: 9.3 MG/DL (ref 8.6–10.5)
CHLORIDE SERPL-SCNC: 97 MMOL/L (ref 98–107)
CO2 SERPL-SCNC: 22.4 MMOL/L (ref 22–29)
COLOR FLD: YELLOW
CREAT SERPL-MCNC: 0.88 MG/DL (ref 0.57–1)
DEPRECATED RDW RBC AUTO: 44.9 FL (ref 37–54)
EGFRCR SERPLBLD CKD-EPI 2021: 74.9 ML/MIN/1.73
EOSINOPHIL # BLD AUTO: 0.17 10*3/MM3 (ref 0–0.4)
EOSINOPHIL NFR BLD AUTO: 1.9 % (ref 0.3–6.2)
ERYTHROCYTE [DISTWIDTH] IN BLOOD BY AUTOMATED COUNT: 13.5 % (ref 12.3–15.4)
GIE STN SPEC: NORMAL
GIE STN SPEC: NORMAL
GLOBULIN UR ELPH-MCNC: 3.8 GM/DL
GLUCOSE BLDC GLUCOMTR-MCNC: 272 MG/DL (ref 70–130)
GLUCOSE BLDC GLUCOMTR-MCNC: 274 MG/DL (ref 70–130)
GLUCOSE FLD-MCNC: 304 MG/DL
GLUCOSE SERPL-MCNC: 315 MG/DL (ref 65–99)
HCT VFR BLD AUTO: 40.6 % (ref 34–46.6)
HGB BLD-MCNC: 12.7 G/DL (ref 12–15.9)
IMM GRANULOCYTES # BLD AUTO: 0.05 10*3/MM3 (ref 0–0.05)
IMM GRANULOCYTES NFR BLD AUTO: 0.6 % (ref 0–0.5)
INR PPP: 1.15 (ref 0.9–1.1)
LDH FLD-CCNC: 188 U/L
LYMPHOCYTES # BLD AUTO: 1.26 10*3/MM3 (ref 0.7–3.1)
LYMPHOCYTES NFR BLD AUTO: 14.1 % (ref 19.6–45.3)
LYMPHOCYTES NFR FLD MANUAL: 81 %
MCH RBC QN AUTO: 28.3 PG (ref 26.6–33)
MCHC RBC AUTO-ENTMCNC: 31.3 G/DL (ref 31.5–35.7)
MCV RBC AUTO: 90.6 FL (ref 79–97)
METHOD: ABNORMAL
MONOCYTES # BLD AUTO: 1.1 10*3/MM3 (ref 0.1–0.9)
MONOCYTES NFR BLD AUTO: 12.3 % (ref 5–12)
MONOCYTES NFR FLD: 16 %
MONOS+MACROS NFR FLD: 2 %
NEUTROPHILS NFR BLD AUTO: 6.3 10*3/MM3 (ref 1.7–7)
NEUTROPHILS NFR BLD AUTO: 70.3 % (ref 42.7–76)
NEUTROPHILS NFR FLD MANUAL: 1 %
NRBC BLD AUTO-RTO: 0 /100 WBC (ref 0–0.2)
NUC CELL # FLD: 935 /MM3
PLATELET # BLD AUTO: 383 10*3/MM3 (ref 140–450)
PMV BLD AUTO: 11.1 FL (ref 6–12)
POTASSIUM SERPL-SCNC: 4.6 MMOL/L (ref 3.5–5.2)
PROT FLD-MCNC: 3.8 G/DL
PROT SERPL-MCNC: 6.1 G/DL (ref 6–8.5)
PROTHROMBIN TIME: 14.6 SECONDS (ref 11.7–14.2)
RBC # BLD AUTO: 4.48 10*6/MM3 (ref 3.77–5.28)
RBC # FLD AUTO: 3787 /MM3
SODIUM SERPL-SCNC: 131 MMOL/L (ref 136–145)
TSH SERPL DL<=0.05 MIU/L-ACNC: 3.27 UIU/ML (ref 0.27–4.2)
WBC NRBC COR # BLD: 8.95 10*3/MM3 (ref 3.4–10.8)

## 2022-09-20 PROCEDURE — 82150 ASSAY OF AMYLASE: CPT | Performed by: HOSPITALIST

## 2022-09-20 PROCEDURE — 76942 ECHO GUIDE FOR BIOPSY: CPT

## 2022-09-20 PROCEDURE — 87102 FUNGUS ISOLATION CULTURE: CPT | Performed by: HOSPITALIST

## 2022-09-20 PROCEDURE — 87015 SPECIMEN INFECT AGNT CONCNTJ: CPT | Performed by: HOSPITALIST

## 2022-09-20 PROCEDURE — 88185 FLOWCYTOMETRY/TC ADD-ON: CPT

## 2022-09-20 PROCEDURE — 88184 FLOWCYTOMETRY/ TC 1 MARKER: CPT

## 2022-09-20 PROCEDURE — 85730 THROMBOPLASTIN TIME PARTIAL: CPT | Performed by: HOSPITALIST

## 2022-09-20 PROCEDURE — 82042 OTHER SOURCE ALBUMIN QUAN EA: CPT | Performed by: HOSPITALIST

## 2022-09-20 PROCEDURE — 87070 CULTURE OTHR SPECIMN AEROBIC: CPT | Performed by: HOSPITALIST

## 2022-09-20 PROCEDURE — 0W9G3ZX DRAINAGE OF PERITONEAL CAVITY, PERCUTANEOUS APPROACH, DIAGNOSTIC: ICD-10-PCS | Performed by: RADIOLOGY

## 2022-09-20 PROCEDURE — 88342 IMHCHEM/IMCYTCHM 1ST ANTB: CPT | Performed by: HOSPITALIST

## 2022-09-20 PROCEDURE — 0 LIDOCAINE 1 % SOLUTION: Performed by: RADIOLOGY

## 2022-09-20 PROCEDURE — 84443 ASSAY THYROID STIM HORMONE: CPT | Performed by: HOSPITALIST

## 2022-09-20 PROCEDURE — 82962 GLUCOSE BLOOD TEST: CPT

## 2022-09-20 PROCEDURE — 82945 GLUCOSE OTHER FLUID: CPT | Performed by: HOSPITALIST

## 2022-09-20 PROCEDURE — 89051 BODY FLUID CELL COUNT: CPT | Performed by: HOSPITALIST

## 2022-09-20 PROCEDURE — 63710000001 INSULIN LISPRO (HUMAN) PER 5 UNITS: Performed by: HOSPITALIST

## 2022-09-20 PROCEDURE — 84157 ASSAY OF PROTEIN OTHER: CPT | Performed by: HOSPITALIST

## 2022-09-20 PROCEDURE — 80053 COMPREHEN METABOLIC PANEL: CPT | Performed by: HOSPITALIST

## 2022-09-20 PROCEDURE — 97166 OT EVAL MOD COMPLEX 45 MIN: CPT

## 2022-09-20 PROCEDURE — 97110 THERAPEUTIC EXERCISES: CPT

## 2022-09-20 PROCEDURE — 87075 CULTR BACTERIA EXCEPT BLOOD: CPT | Performed by: HOSPITALIST

## 2022-09-20 PROCEDURE — 88341 IMHCHEM/IMCYTCHM EA ADD ANTB: CPT | Performed by: HOSPITALIST

## 2022-09-20 PROCEDURE — 85610 PROTHROMBIN TIME: CPT | Performed by: HOSPITALIST

## 2022-09-20 PROCEDURE — 88305 TISSUE EXAM BY PATHOLOGIST: CPT | Performed by: HOSPITALIST

## 2022-09-20 PROCEDURE — 87205 SMEAR GRAM STAIN: CPT | Performed by: HOSPITALIST

## 2022-09-20 PROCEDURE — 87206 SMEAR FLUORESCENT/ACID STAI: CPT | Performed by: HOSPITALIST

## 2022-09-20 PROCEDURE — 97162 PT EVAL MOD COMPLEX 30 MIN: CPT

## 2022-09-20 PROCEDURE — 99223 1ST HOSP IP/OBS HIGH 75: CPT | Performed by: SURGERY

## 2022-09-20 PROCEDURE — 82247 BILIRUBIN TOTAL: CPT | Performed by: HOSPITALIST

## 2022-09-20 PROCEDURE — 83615 LACTATE (LD) (LDH) ENZYME: CPT | Performed by: HOSPITALIST

## 2022-09-20 PROCEDURE — 88112 CYTOPATH CELL ENHANCE TECH: CPT | Performed by: HOSPITALIST

## 2022-09-20 PROCEDURE — 97535 SELF CARE MNGMENT TRAINING: CPT

## 2022-09-20 PROCEDURE — 85025 COMPLETE CBC W/AUTO DIFF WBC: CPT | Performed by: HOSPITALIST

## 2022-09-20 RX ORDER — LIDOCAINE HYDROCHLORIDE 10 MG/ML
10 INJECTION, SOLUTION INFILTRATION; PERINEURAL ONCE
Status: COMPLETED | OUTPATIENT
Start: 2022-09-20 | End: 2022-09-20

## 2022-09-20 RX ADMIN — SENNOSIDES 2 TABLET: 8.6 TABLET, FILM COATED ORAL at 21:38

## 2022-09-20 RX ADMIN — GLYCERIN 1 DROP: .002; .002; .01 SOLUTION/ DROPS OPHTHALMIC at 23:17

## 2022-09-20 RX ADMIN — Medication 10 ML: at 21:38

## 2022-09-20 RX ADMIN — ASPIRIN 81 MG: 81 TABLET, CHEWABLE ORAL at 10:25

## 2022-09-20 RX ADMIN — ATORVASTATIN CALCIUM 20 MG: 20 TABLET, FILM COATED ORAL at 10:25

## 2022-09-20 RX ADMIN — PANTOPRAZOLE SODIUM 40 MG: 40 TABLET, DELAYED RELEASE ORAL at 10:25

## 2022-09-20 RX ADMIN — INSULIN LISPRO 4 UNITS: 100 INJECTION, SOLUTION INTRAVENOUS; SUBCUTANEOUS at 18:16

## 2022-09-20 RX ADMIN — HYDROCODONE BITARTRATE AND ACETAMINOPHEN 1 TABLET: 5; 325 TABLET ORAL at 21:38

## 2022-09-20 RX ADMIN — ESCITALOPRAM 5 MG: 5 TABLET, FILM COATED ORAL at 10:25

## 2022-09-20 RX ADMIN — Medication 10 ML: at 10:27

## 2022-09-20 RX ADMIN — LIDOCAINE HYDROCHLORIDE 5 ML: 10 INJECTION, SOLUTION INFILTRATION; PERINEURAL at 08:26

## 2022-09-20 RX ADMIN — INSULIN LISPRO 4 UNITS: 100 INJECTION, SOLUTION INTRAVENOUS; SUBCUTANEOUS at 10:26

## 2022-09-20 RX ADMIN — INSULIN GLARGINE-YFGN 20 UNITS: 100 INJECTION, SOLUTION SUBCUTANEOUS at 21:39

## 2022-09-20 RX ADMIN — INSULIN LISPRO 4 UNITS: 100 INJECTION, SOLUTION INTRAVENOUS; SUBCUTANEOUS at 14:05

## 2022-09-21 ENCOUNTER — APPOINTMENT (OUTPATIENT)
Dept: ULTRASOUND IMAGING | Facility: HOSPITAL | Age: 62
End: 2022-09-21

## 2022-09-21 LAB
ANION GAP SERPL CALCULATED.3IONS-SCNC: 13.2 MMOL/L (ref 5–15)
BASOPHILS # BLD AUTO: 0.06 10*3/MM3 (ref 0–0.2)
BASOPHILS NFR BLD AUTO: 0.7 % (ref 0–1.5)
BUN SERPL-MCNC: 15 MG/DL (ref 8–23)
BUN/CREAT SERPL: 13.6 (ref 7–25)
CALCIUM SPEC-SCNC: 9.5 MG/DL (ref 8.6–10.5)
CHLORIDE SERPL-SCNC: 101 MMOL/L (ref 98–107)
CO2 SERPL-SCNC: 22.8 MMOL/L (ref 22–29)
CREAT SERPL-MCNC: 1.1 MG/DL (ref 0.57–1)
DEPRECATED RDW RBC AUTO: 44 FL (ref 37–54)
EGFRCR SERPLBLD CKD-EPI 2021: 57.3 ML/MIN/1.73
EOSINOPHIL # BLD AUTO: 0.16 10*3/MM3 (ref 0–0.4)
EOSINOPHIL NFR BLD AUTO: 1.8 % (ref 0.3–6.2)
ERYTHROCYTE [DISTWIDTH] IN BLOOD BY AUTOMATED COUNT: 13.3 % (ref 12.3–15.4)
GLUCOSE BLDC GLUCOMTR-MCNC: 156 MG/DL (ref 70–130)
GLUCOSE BLDC GLUCOMTR-MCNC: 204 MG/DL (ref 70–130)
GLUCOSE BLDC GLUCOMTR-MCNC: 233 MG/DL (ref 70–130)
GLUCOSE BLDC GLUCOMTR-MCNC: 269 MG/DL (ref 70–130)
GLUCOSE SERPL-MCNC: 249 MG/DL (ref 65–99)
HCT VFR BLD AUTO: 40.2 % (ref 34–46.6)
HGB BLD-MCNC: 12.4 G/DL (ref 12–15.9)
IMM GRANULOCYTES # BLD AUTO: 0.04 10*3/MM3 (ref 0–0.05)
IMM GRANULOCYTES NFR BLD AUTO: 0.4 % (ref 0–0.5)
LYMPHOCYTES # BLD AUTO: 1.53 10*3/MM3 (ref 0.7–3.1)
LYMPHOCYTES NFR BLD AUTO: 17.1 % (ref 19.6–45.3)
MCH RBC QN AUTO: 27.8 PG (ref 26.6–33)
MCHC RBC AUTO-ENTMCNC: 30.8 G/DL (ref 31.5–35.7)
MCV RBC AUTO: 90.1 FL (ref 79–97)
MONOCYTES # BLD AUTO: 1.13 10*3/MM3 (ref 0.1–0.9)
MONOCYTES NFR BLD AUTO: 12.6 % (ref 5–12)
NEUTROPHILS NFR BLD AUTO: 6.04 10*3/MM3 (ref 1.7–7)
NEUTROPHILS NFR BLD AUTO: 67.4 % (ref 42.7–76)
NRBC BLD AUTO-RTO: 0 /100 WBC (ref 0–0.2)
PLATELET # BLD AUTO: 372 10*3/MM3 (ref 140–450)
PMV BLD AUTO: 10.4 FL (ref 6–12)
POTASSIUM SERPL-SCNC: 5 MMOL/L (ref 3.5–5.2)
RBC # BLD AUTO: 4.46 10*6/MM3 (ref 3.77–5.28)
REF LAB TEST METHOD: NORMAL
SODIUM SERPL-SCNC: 137 MMOL/L (ref 136–145)
WBC NRBC COR # BLD: 8.96 10*3/MM3 (ref 3.4–10.8)

## 2022-09-21 PROCEDURE — 85025 COMPLETE CBC W/AUTO DIFF WBC: CPT | Performed by: HOSPITALIST

## 2022-09-21 PROCEDURE — 80048 BASIC METABOLIC PNL TOTAL CA: CPT | Performed by: HOSPITALIST

## 2022-09-21 PROCEDURE — 82962 GLUCOSE BLOOD TEST: CPT

## 2022-09-21 PROCEDURE — 76856 US EXAM PELVIC COMPLETE: CPT

## 2022-09-21 PROCEDURE — 63710000001 INSULIN LISPRO (HUMAN) PER 5 UNITS: Performed by: HOSPITALIST

## 2022-09-21 RX ORDER — POLYETHYLENE GLYCOL 3350 17 G/17G
17 POWDER, FOR SOLUTION ORAL DAILY
Status: DISCONTINUED | OUTPATIENT
Start: 2022-09-21 | End: 2022-10-02 | Stop reason: HOSPADM

## 2022-09-21 RX ORDER — ALPRAZOLAM 0.5 MG/1
0.5 TABLET ORAL 2 TIMES DAILY PRN
Status: ACTIVE | OUTPATIENT
Start: 2022-09-21 | End: 2022-09-28

## 2022-09-21 RX ADMIN — PANTOPRAZOLE SODIUM 40 MG: 40 TABLET, DELAYED RELEASE ORAL at 09:50

## 2022-09-21 RX ADMIN — INSULIN GLARGINE-YFGN 20 UNITS: 100 INJECTION, SOLUTION SUBCUTANEOUS at 21:30

## 2022-09-21 RX ADMIN — INSULIN LISPRO 4 UNITS: 100 INJECTION, SOLUTION INTRAVENOUS; SUBCUTANEOUS at 17:57

## 2022-09-21 RX ADMIN — ATORVASTATIN CALCIUM 20 MG: 20 TABLET, FILM COATED ORAL at 09:50

## 2022-09-21 RX ADMIN — SENNOSIDES 2 TABLET: 8.6 TABLET, FILM COATED ORAL at 20:25

## 2022-09-21 RX ADMIN — INSULIN GLARGINE-YFGN 20 UNITS: 100 INJECTION, SOLUTION SUBCUTANEOUS at 09:51

## 2022-09-21 RX ADMIN — INSULIN LISPRO 3 UNITS: 100 INJECTION, SOLUTION INTRAVENOUS; SUBCUTANEOUS at 09:50

## 2022-09-21 RX ADMIN — HYDROCODONE BITARTRATE AND ACETAMINOPHEN 1 TABLET: 5; 325 TABLET ORAL at 21:30

## 2022-09-21 RX ADMIN — GLYCERIN 1 DROP: .002; .002; .01 SOLUTION/ DROPS OPHTHALMIC at 09:59

## 2022-09-21 RX ADMIN — GLYCERIN 1 DROP: .002; .002; .01 SOLUTION/ DROPS OPHTHALMIC at 21:30

## 2022-09-21 RX ADMIN — Medication 10 ML: at 20:26

## 2022-09-21 RX ADMIN — POLYETHYLENE GLYCOL 3350 17 G: 17 POWDER, FOR SOLUTION ORAL at 17:56

## 2022-09-21 RX ADMIN — INSULIN LISPRO 3 UNITS: 100 INJECTION, SOLUTION INTRAVENOUS; SUBCUTANEOUS at 17:56

## 2022-09-21 RX ADMIN — ASPIRIN 81 MG: 81 TABLET, CHEWABLE ORAL at 09:50

## 2022-09-21 RX ADMIN — INSULIN LISPRO 4 UNITS: 100 INJECTION, SOLUTION INTRAVENOUS; SUBCUTANEOUS at 12:26

## 2022-09-21 RX ADMIN — INSULIN LISPRO 4 UNITS: 100 INJECTION, SOLUTION INTRAVENOUS; SUBCUTANEOUS at 09:51

## 2022-09-21 RX ADMIN — Medication 10 ML: at 09:56

## 2022-09-21 RX ADMIN — ESCITALOPRAM 5 MG: 5 TABLET, FILM COATED ORAL at 09:50

## 2022-09-21 RX ADMIN — HYDROCODONE BITARTRATE AND ACETAMINOPHEN 1 TABLET: 5; 325 TABLET ORAL at 10:01

## 2022-09-22 LAB
ANION GAP SERPL CALCULATED.3IONS-SCNC: 10.4 MMOL/L (ref 5–15)
BASOPHILS # BLD AUTO: 0.04 10*3/MM3 (ref 0–0.2)
BASOPHILS NFR BLD AUTO: 0.5 % (ref 0–1.5)
BUN SERPL-MCNC: 13 MG/DL (ref 8–23)
BUN/CREAT SERPL: 14.6 (ref 7–25)
CALCIUM SPEC-SCNC: 8.9 MG/DL (ref 8.6–10.5)
CHLORIDE SERPL-SCNC: 105 MMOL/L (ref 98–107)
CO2 SERPL-SCNC: 19.6 MMOL/L (ref 22–29)
CREAT SERPL-MCNC: 0.89 MG/DL (ref 0.57–1)
DEPRECATED RDW RBC AUTO: 46 FL (ref 37–54)
EGFRCR SERPLBLD CKD-EPI 2021: 73.9 ML/MIN/1.73
EOSINOPHIL # BLD AUTO: 0.12 10*3/MM3 (ref 0–0.4)
EOSINOPHIL NFR BLD AUTO: 1.4 % (ref 0.3–6.2)
ERYTHROCYTE [DISTWIDTH] IN BLOOD BY AUTOMATED COUNT: 13.5 % (ref 12.3–15.4)
GLUCOSE BLDC GLUCOMTR-MCNC: 122 MG/DL (ref 70–130)
GLUCOSE BLDC GLUCOMTR-MCNC: 154 MG/DL (ref 70–130)
GLUCOSE BLDC GLUCOMTR-MCNC: 166 MG/DL (ref 70–130)
GLUCOSE BLDC GLUCOMTR-MCNC: 197 MG/DL (ref 70–130)
GLUCOSE SERPL-MCNC: 173 MG/DL (ref 65–99)
HCT VFR BLD AUTO: 44 % (ref 34–46.6)
HGB BLD-MCNC: 13.3 G/DL (ref 12–15.9)
INR PPP: 1.06 (ref 0.9–1.1)
LYMPHOCYTES # BLD AUTO: 1.34 10*3/MM3 (ref 0.7–3.1)
LYMPHOCYTES NFR BLD AUTO: 15.9 % (ref 19.6–45.3)
MCH RBC QN AUTO: 28.2 PG (ref 26.6–33)
MCHC RBC AUTO-ENTMCNC: 30.2 G/DL (ref 31.5–35.7)
MCV RBC AUTO: 93.2 FL (ref 79–97)
MONOCYTES # BLD AUTO: 0.98 10*3/MM3 (ref 0.1–0.9)
MONOCYTES NFR BLD AUTO: 11.6 % (ref 5–12)
NEUTROPHILS NFR BLD AUTO: 5.89 10*3/MM3 (ref 1.7–7)
NEUTROPHILS NFR BLD AUTO: 69.7 % (ref 42.7–76)
PLATELET # BLD AUTO: 324 10*3/MM3 (ref 140–450)
PMV BLD AUTO: 11.1 FL (ref 6–12)
POTASSIUM SERPL-SCNC: 4.9 MMOL/L (ref 3.5–5.2)
PROTHROMBIN TIME: 13.7 SECONDS (ref 11.7–14.2)
RBC # BLD AUTO: 4.72 10*6/MM3 (ref 3.77–5.28)
SODIUM SERPL-SCNC: 135 MMOL/L (ref 136–145)
WBC NRBC COR # BLD: 8.45 10*3/MM3 (ref 3.4–10.8)

## 2022-09-22 PROCEDURE — 85610 PROTHROMBIN TIME: CPT | Performed by: HOSPITALIST

## 2022-09-22 PROCEDURE — 82962 GLUCOSE BLOOD TEST: CPT

## 2022-09-22 PROCEDURE — 85025 COMPLETE CBC W/AUTO DIFF WBC: CPT | Performed by: HOSPITALIST

## 2022-09-22 PROCEDURE — 63710000001 INSULIN LISPRO (HUMAN) PER 5 UNITS: Performed by: HOSPITALIST

## 2022-09-22 PROCEDURE — 80048 BASIC METABOLIC PNL TOTAL CA: CPT | Performed by: HOSPITALIST

## 2022-09-22 RX ADMIN — INSULIN GLARGINE-YFGN 20 UNITS: 100 INJECTION, SOLUTION SUBCUTANEOUS at 21:41

## 2022-09-22 RX ADMIN — INSULIN LISPRO 4 UNITS: 100 INJECTION, SOLUTION INTRAVENOUS; SUBCUTANEOUS at 08:20

## 2022-09-22 RX ADMIN — ATORVASTATIN CALCIUM 20 MG: 20 TABLET, FILM COATED ORAL at 08:19

## 2022-09-22 RX ADMIN — INSULIN GLARGINE-YFGN 20 UNITS: 100 INJECTION, SOLUTION SUBCUTANEOUS at 08:20

## 2022-09-22 RX ADMIN — SENNOSIDES 2 TABLET: 8.6 TABLET, FILM COATED ORAL at 20:21

## 2022-09-22 RX ADMIN — POLYETHYLENE GLYCOL 3350 17 G: 17 POWDER, FOR SOLUTION ORAL at 08:19

## 2022-09-22 RX ADMIN — INSULIN LISPRO 4 UNITS: 100 INJECTION, SOLUTION INTRAVENOUS; SUBCUTANEOUS at 17:05

## 2022-09-22 RX ADMIN — INSULIN LISPRO 4 UNITS: 100 INJECTION, SOLUTION INTRAVENOUS; SUBCUTANEOUS at 12:25

## 2022-09-22 RX ADMIN — Medication 10 ML: at 08:29

## 2022-09-22 RX ADMIN — HYDROCODONE BITARTRATE AND ACETAMINOPHEN 1 TABLET: 5; 325 TABLET ORAL at 20:25

## 2022-09-22 RX ADMIN — INSULIN LISPRO 2 UNITS: 100 INJECTION, SOLUTION INTRAVENOUS; SUBCUTANEOUS at 17:05

## 2022-09-22 RX ADMIN — ASPIRIN 81 MG: 81 TABLET, CHEWABLE ORAL at 08:19

## 2022-09-22 RX ADMIN — INSULIN LISPRO 2 UNITS: 100 INJECTION, SOLUTION INTRAVENOUS; SUBCUTANEOUS at 12:25

## 2022-09-22 RX ADMIN — INSULIN LISPRO 2 UNITS: 100 INJECTION, SOLUTION INTRAVENOUS; SUBCUTANEOUS at 06:11

## 2022-09-22 RX ADMIN — Medication 10 ML: at 20:21

## 2022-09-22 RX ADMIN — PANTOPRAZOLE SODIUM 40 MG: 40 TABLET, DELAYED RELEASE ORAL at 08:19

## 2022-09-23 ENCOUNTER — APPOINTMENT (OUTPATIENT)
Dept: CT IMAGING | Facility: HOSPITAL | Age: 62
End: 2022-09-23

## 2022-09-23 LAB
ANION GAP SERPL CALCULATED.3IONS-SCNC: 9.6 MMOL/L (ref 5–15)
BASOPHILS # BLD AUTO: 0.08 10*3/MM3 (ref 0–0.2)
BASOPHILS NFR BLD AUTO: 0.9 % (ref 0–1.5)
BUN SERPL-MCNC: 14 MG/DL (ref 8–23)
BUN/CREAT SERPL: 14.9 (ref 7–25)
CALCIUM SPEC-SCNC: 9.2 MG/DL (ref 8.6–10.5)
CHLORIDE SERPL-SCNC: 102 MMOL/L (ref 98–107)
CO2 SERPL-SCNC: 27.4 MMOL/L (ref 22–29)
CREAT SERPL-MCNC: 0.94 MG/DL (ref 0.57–1)
CYTO UR: NORMAL
DEPRECATED RDW RBC AUTO: 44.3 FL (ref 37–54)
EGFRCR SERPLBLD CKD-EPI 2021: 69.2 ML/MIN/1.73
EOSINOPHIL # BLD AUTO: 0.15 10*3/MM3 (ref 0–0.4)
EOSINOPHIL NFR BLD AUTO: 1.6 % (ref 0.3–6.2)
ERYTHROCYTE [DISTWIDTH] IN BLOOD BY AUTOMATED COUNT: 13.4 % (ref 12.3–15.4)
GLUCOSE BLDC GLUCOMTR-MCNC: 118 MG/DL (ref 70–130)
GLUCOSE BLDC GLUCOMTR-MCNC: 131 MG/DL (ref 70–130)
GLUCOSE BLDC GLUCOMTR-MCNC: 174 MG/DL (ref 70–130)
GLUCOSE SERPL-MCNC: 146 MG/DL (ref 65–99)
HCT VFR BLD AUTO: 39.1 % (ref 34–46.6)
HGB BLD-MCNC: 12.4 G/DL (ref 12–15.9)
IMM GRANULOCYTES # BLD AUTO: 0.05 10*3/MM3 (ref 0–0.05)
IMM GRANULOCYTES NFR BLD AUTO: 0.5 % (ref 0–0.5)
LAB AP CASE REPORT: NORMAL
LAB AP DIAGNOSIS COMMENT: NORMAL
LAB AP SPECIAL STAINS: NORMAL
LYMPHOCYTES # BLD AUTO: 1.75 10*3/MM3 (ref 0.7–3.1)
LYMPHOCYTES NFR BLD AUTO: 19.2 % (ref 19.6–45.3)
MCH RBC QN AUTO: 28.4 PG (ref 26.6–33)
MCHC RBC AUTO-ENTMCNC: 31.7 G/DL (ref 31.5–35.7)
MCV RBC AUTO: 89.7 FL (ref 79–97)
MONOCYTES # BLD AUTO: 1.1 10*3/MM3 (ref 0.1–0.9)
MONOCYTES NFR BLD AUTO: 12.1 % (ref 5–12)
NEUTROPHILS NFR BLD AUTO: 5.99 10*3/MM3 (ref 1.7–7)
NEUTROPHILS NFR BLD AUTO: 65.7 % (ref 42.7–76)
NRBC BLD AUTO-RTO: 0 /100 WBC (ref 0–0.2)
PATH REPORT.ADDENDUM SPEC: NORMAL
PATH REPORT.FINAL DX SPEC: NORMAL
PATH REPORT.GROSS SPEC: NORMAL
PLATELET # BLD AUTO: 371 10*3/MM3 (ref 140–450)
PMV BLD AUTO: 11.2 FL (ref 6–12)
POTASSIUM SERPL-SCNC: 4.4 MMOL/L (ref 3.5–5.2)
RBC # BLD AUTO: 4.36 10*6/MM3 (ref 3.77–5.28)
SODIUM SERPL-SCNC: 139 MMOL/L (ref 136–145)
WBC NRBC COR # BLD: 9.12 10*3/MM3 (ref 3.4–10.8)

## 2022-09-23 PROCEDURE — 88360 TUMOR IMMUNOHISTOCHEM/MANUAL: CPT | Performed by: PHYSICIAN ASSISTANT

## 2022-09-23 PROCEDURE — 88341 IMHCHEM/IMCYTCHM EA ADD ANTB: CPT | Performed by: PHYSICIAN ASSISTANT

## 2022-09-23 PROCEDURE — 88342 IMHCHEM/IMCYTCHM 1ST ANTB: CPT | Performed by: PHYSICIAN ASSISTANT

## 2022-09-23 PROCEDURE — 82962 GLUCOSE BLOOD TEST: CPT

## 2022-09-23 PROCEDURE — 25010000002 MIDAZOLAM PER 1 MG: Performed by: RADIOLOGY

## 2022-09-23 PROCEDURE — 0DBU3ZX EXCISION OF OMENTUM, PERCUTANEOUS APPROACH, DIAGNOSTIC: ICD-10-PCS | Performed by: RADIOLOGY

## 2022-09-23 PROCEDURE — 63710000001 INSULIN LISPRO (HUMAN) PER 5 UNITS: Performed by: HOSPITALIST

## 2022-09-23 PROCEDURE — 85025 COMPLETE CBC W/AUTO DIFF WBC: CPT | Performed by: HOSPITALIST

## 2022-09-23 PROCEDURE — 0 LIDOCAINE 1 % SOLUTION: Performed by: RADIOLOGY

## 2022-09-23 PROCEDURE — 25010000002 FENTANYL CITRATE (PF) 50 MCG/ML SOLUTION: Performed by: RADIOLOGY

## 2022-09-23 PROCEDURE — 80048 BASIC METABOLIC PNL TOTAL CA: CPT | Performed by: HOSPITALIST

## 2022-09-23 PROCEDURE — 88305 TISSUE EXAM BY PATHOLOGIST: CPT | Performed by: PHYSICIAN ASSISTANT

## 2022-09-23 PROCEDURE — 77012 CT SCAN FOR NEEDLE BIOPSY: CPT

## 2022-09-23 RX ORDER — FENTANYL CITRATE 50 UG/ML
INJECTION, SOLUTION INTRAMUSCULAR; INTRAVENOUS
Status: DISCONTINUED | OUTPATIENT
Start: 2022-09-23 | End: 2022-10-02 | Stop reason: HOSPADM

## 2022-09-23 RX ORDER — LIDOCAINE HYDROCHLORIDE 10 MG/ML
20 INJECTION, SOLUTION INFILTRATION; PERINEURAL ONCE
Status: COMPLETED | OUTPATIENT
Start: 2022-09-23 | End: 2022-09-23

## 2022-09-23 RX ORDER — SODIUM CHLORIDE 9 MG/ML
INJECTION, SOLUTION INTRAVENOUS
Status: DISCONTINUED | OUTPATIENT
Start: 2022-09-23 | End: 2022-10-02 | Stop reason: HOSPADM

## 2022-09-23 RX ORDER — MIDAZOLAM HYDROCHLORIDE 1 MG/ML
0.5 INJECTION INTRAMUSCULAR; INTRAVENOUS ONCE
Status: COMPLETED | OUTPATIENT
Start: 2022-09-23 | End: 2022-09-23

## 2022-09-23 RX ORDER — MIDAZOLAM HYDROCHLORIDE 1 MG/ML
INJECTION INTRAMUSCULAR; INTRAVENOUS
Status: DISCONTINUED | OUTPATIENT
Start: 2022-09-23 | End: 2022-10-02 | Stop reason: HOSPADM

## 2022-09-23 RX ADMIN — ATORVASTATIN CALCIUM 20 MG: 20 TABLET, FILM COATED ORAL at 13:15

## 2022-09-23 RX ADMIN — INSULIN LISPRO 4 UNITS: 100 INJECTION, SOLUTION INTRAVENOUS; SUBCUTANEOUS at 16:47

## 2022-09-23 RX ADMIN — PANTOPRAZOLE SODIUM 40 MG: 40 TABLET, DELAYED RELEASE ORAL at 13:15

## 2022-09-23 RX ADMIN — MIDAZOLAM 1 MG: 1 INJECTION INTRAMUSCULAR; INTRAVENOUS at 11:29

## 2022-09-23 RX ADMIN — POLYETHYLENE GLYCOL 3350 17 G: 17 POWDER, FOR SOLUTION ORAL at 13:18

## 2022-09-23 RX ADMIN — LIDOCAINE HYDROCHLORIDE 20 ML: 10 INJECTION, SOLUTION INFILTRATION; PERINEURAL at 11:31

## 2022-09-23 RX ADMIN — SODIUM CHLORIDE 30 ML/HR: 9 INJECTION, SOLUTION INTRAVENOUS at 11:19

## 2022-09-23 RX ADMIN — ACETAMINOPHEN 650 MG: 325 TABLET, FILM COATED ORAL at 13:15

## 2022-09-23 RX ADMIN — SENNOSIDES 2 TABLET: 8.6 TABLET, FILM COATED ORAL at 21:04

## 2022-09-23 RX ADMIN — INSULIN GLARGINE-YFGN 20 UNITS: 100 INJECTION, SOLUTION SUBCUTANEOUS at 21:15

## 2022-09-23 RX ADMIN — Medication 10 ML: at 10:49

## 2022-09-23 RX ADMIN — Medication 10 ML: at 09:00

## 2022-09-23 RX ADMIN — MIDAZOLAM 0.5 MG: 1 INJECTION, SOLUTION INTRAMUSCULAR; INTRAVENOUS at 10:48

## 2022-09-23 RX ADMIN — FENTANYL CITRATE 50 MCG: 50 INJECTION INTRAMUSCULAR; INTRAVENOUS at 11:30

## 2022-09-23 RX ADMIN — GLYCERIN 1 DROP: .002; .002; .01 SOLUTION/ DROPS OPHTHALMIC at 21:11

## 2022-09-23 RX ADMIN — Medication 10 ML: at 21:06

## 2022-09-24 LAB
GLUCOSE BLDC GLUCOMTR-MCNC: 115 MG/DL (ref 70–130)
GLUCOSE BLDC GLUCOMTR-MCNC: 132 MG/DL (ref 70–130)
GLUCOSE BLDC GLUCOMTR-MCNC: 86 MG/DL (ref 70–130)

## 2022-09-24 PROCEDURE — 63710000001 INSULIN LISPRO (HUMAN) PER 5 UNITS: Performed by: HOSPITALIST

## 2022-09-24 PROCEDURE — 82962 GLUCOSE BLOOD TEST: CPT

## 2022-09-24 RX ADMIN — INSULIN GLARGINE-YFGN 20 UNITS: 100 INJECTION, SOLUTION SUBCUTANEOUS at 23:30

## 2022-09-24 RX ADMIN — HYDROCODONE BITARTRATE AND ACETAMINOPHEN 1 TABLET: 5; 325 TABLET ORAL at 23:29

## 2022-09-24 RX ADMIN — Medication 10 ML: at 23:32

## 2022-09-24 RX ADMIN — INSULIN LISPRO 4 UNITS: 100 INJECTION, SOLUTION INTRAVENOUS; SUBCUTANEOUS at 12:28

## 2022-09-24 RX ADMIN — PANTOPRAZOLE SODIUM 40 MG: 40 TABLET, DELAYED RELEASE ORAL at 08:05

## 2022-09-24 RX ADMIN — Medication 10 ML: at 08:10

## 2022-09-24 RX ADMIN — POLYETHYLENE GLYCOL 3350 17 G: 17 POWDER, FOR SOLUTION ORAL at 08:05

## 2022-09-24 RX ADMIN — SENNOSIDES 2 TABLET: 8.6 TABLET, FILM COATED ORAL at 23:29

## 2022-09-24 RX ADMIN — INSULIN GLARGINE-YFGN 20 UNITS: 100 INJECTION, SOLUTION SUBCUTANEOUS at 08:06

## 2022-09-24 RX ADMIN — ASPIRIN 81 MG: 81 TABLET, CHEWABLE ORAL at 08:06

## 2022-09-24 RX ADMIN — INSULIN LISPRO 4 UNITS: 100 INJECTION, SOLUTION INTRAVENOUS; SUBCUTANEOUS at 17:12

## 2022-09-24 RX ADMIN — INSULIN LISPRO 4 UNITS: 100 INJECTION, SOLUTION INTRAVENOUS; SUBCUTANEOUS at 08:05

## 2022-09-24 RX ADMIN — ATORVASTATIN CALCIUM 20 MG: 20 TABLET, FILM COATED ORAL at 08:05

## 2022-09-25 LAB
ANION GAP SERPL CALCULATED.3IONS-SCNC: 7.2 MMOL/L (ref 5–15)
BACTERIA FLD CULT: NORMAL
BACTERIA SPEC ANAEROBE CULT: NORMAL
BUN SERPL-MCNC: 15 MG/DL (ref 8–23)
BUN/CREAT SERPL: 15 (ref 7–25)
CALCIUM SPEC-SCNC: 8.8 MG/DL (ref 8.6–10.5)
CHLORIDE SERPL-SCNC: 101 MMOL/L (ref 98–107)
CO2 SERPL-SCNC: 30.8 MMOL/L (ref 22–29)
CREAT SERPL-MCNC: 1 MG/DL (ref 0.57–1)
DEPRECATED RDW RBC AUTO: 43.8 FL (ref 37–54)
EGFRCR SERPLBLD CKD-EPI 2021: 64.2 ML/MIN/1.73
ERYTHROCYTE [DISTWIDTH] IN BLOOD BY AUTOMATED COUNT: 13.3 % (ref 12.3–15.4)
GLUCOSE BLDC GLUCOMTR-MCNC: 118 MG/DL (ref 70–130)
GLUCOSE BLDC GLUCOMTR-MCNC: 140 MG/DL (ref 70–130)
GLUCOSE BLDC GLUCOMTR-MCNC: 187 MG/DL (ref 70–130)
GLUCOSE SERPL-MCNC: 195 MG/DL (ref 65–99)
GRAM STN SPEC: NORMAL
HCT VFR BLD AUTO: 39.1 % (ref 34–46.6)
HGB BLD-MCNC: 12.3 G/DL (ref 12–15.9)
MCH RBC QN AUTO: 28.3 PG (ref 26.6–33)
MCHC RBC AUTO-ENTMCNC: 31.5 G/DL (ref 31.5–35.7)
MCV RBC AUTO: 89.9 FL (ref 79–97)
PLATELET # BLD AUTO: 402 10*3/MM3 (ref 140–450)
PMV BLD AUTO: 11.2 FL (ref 6–12)
POTASSIUM SERPL-SCNC: 4.6 MMOL/L (ref 3.5–5.2)
RBC # BLD AUTO: 4.35 10*6/MM3 (ref 3.77–5.28)
SODIUM SERPL-SCNC: 139 MMOL/L (ref 136–145)
WBC NRBC COR # BLD: 8.67 10*3/MM3 (ref 3.4–10.8)

## 2022-09-25 PROCEDURE — 82962 GLUCOSE BLOOD TEST: CPT

## 2022-09-25 PROCEDURE — 85027 COMPLETE CBC AUTOMATED: CPT | Performed by: STUDENT IN AN ORGANIZED HEALTH CARE EDUCATION/TRAINING PROGRAM

## 2022-09-25 PROCEDURE — 80048 BASIC METABOLIC PNL TOTAL CA: CPT | Performed by: STUDENT IN AN ORGANIZED HEALTH CARE EDUCATION/TRAINING PROGRAM

## 2022-09-25 PROCEDURE — 63710000001 INSULIN LISPRO (HUMAN) PER 5 UNITS: Performed by: HOSPITALIST

## 2022-09-25 PROCEDURE — 25010000002 ENOXAPARIN PER 10 MG: Performed by: STUDENT IN AN ORGANIZED HEALTH CARE EDUCATION/TRAINING PROGRAM

## 2022-09-25 RX ORDER — ENOXAPARIN SODIUM 100 MG/ML
40 INJECTION SUBCUTANEOUS EVERY 24 HOURS
Status: DISCONTINUED | OUTPATIENT
Start: 2022-09-25 | End: 2022-10-02 | Stop reason: HOSPADM

## 2022-09-25 RX ADMIN — INSULIN GLARGINE-YFGN 20 UNITS: 100 INJECTION, SOLUTION SUBCUTANEOUS at 21:12

## 2022-09-25 RX ADMIN — INSULIN LISPRO 4 UNITS: 100 INJECTION, SOLUTION INTRAVENOUS; SUBCUTANEOUS at 17:36

## 2022-09-25 RX ADMIN — Medication 10 ML: at 21:11

## 2022-09-25 RX ADMIN — ENOXAPARIN SODIUM 40 MG: 100 INJECTION SUBCUTANEOUS at 15:52

## 2022-09-25 RX ADMIN — Medication 10 ML: at 08:23

## 2022-09-25 RX ADMIN — ATORVASTATIN CALCIUM 20 MG: 20 TABLET, FILM COATED ORAL at 08:15

## 2022-09-25 RX ADMIN — POLYETHYLENE GLYCOL 3350 17 G: 17 POWDER, FOR SOLUTION ORAL at 08:15

## 2022-09-25 RX ADMIN — INSULIN LISPRO 2 UNITS: 100 INJECTION, SOLUTION INTRAVENOUS; SUBCUTANEOUS at 08:15

## 2022-09-25 RX ADMIN — INSULIN GLARGINE-YFGN 20 UNITS: 100 INJECTION, SOLUTION SUBCUTANEOUS at 08:23

## 2022-09-25 RX ADMIN — INSULIN LISPRO 4 UNITS: 100 INJECTION, SOLUTION INTRAVENOUS; SUBCUTANEOUS at 08:16

## 2022-09-25 RX ADMIN — SENNOSIDES 2 TABLET: 8.6 TABLET, FILM COATED ORAL at 21:11

## 2022-09-25 RX ADMIN — PANTOPRAZOLE SODIUM 40 MG: 40 TABLET, DELAYED RELEASE ORAL at 08:15

## 2022-09-25 RX ADMIN — ASPIRIN 81 MG: 81 TABLET, CHEWABLE ORAL at 08:15

## 2022-09-25 RX ADMIN — ACETAMINOPHEN 650 MG: 325 TABLET, FILM COATED ORAL at 21:11

## 2022-09-26 ENCOUNTER — APPOINTMENT (OUTPATIENT)
Dept: ULTRASOUND IMAGING | Facility: HOSPITAL | Age: 62
End: 2022-09-26

## 2022-09-26 LAB
GLUCOSE BLDC GLUCOMTR-MCNC: 115 MG/DL (ref 70–130)
GLUCOSE BLDC GLUCOMTR-MCNC: 122 MG/DL (ref 70–130)
GLUCOSE BLDC GLUCOMTR-MCNC: 139 MG/DL (ref 70–130)
GLUCOSE BLDC GLUCOMTR-MCNC: 149 MG/DL (ref 70–130)
LAB AP CASE REPORT: NORMAL
LAB AP CLINICAL INFORMATION: NORMAL
LAB AP DIAGNOSIS COMMENT: NORMAL
LAB AP SPECIAL STAINS: NORMAL
PATH REPORT.FINAL DX SPEC: NORMAL
PATH REPORT.GROSS SPEC: NORMAL

## 2022-09-26 PROCEDURE — 0W9G3ZZ DRAINAGE OF PERITONEAL CAVITY, PERCUTANEOUS APPROACH: ICD-10-PCS | Performed by: RADIOLOGY

## 2022-09-26 PROCEDURE — 0 LIDOCAINE 1 % SOLUTION: Performed by: RADIOLOGY

## 2022-09-26 PROCEDURE — 76942 ECHO GUIDE FOR BIOPSY: CPT

## 2022-09-26 PROCEDURE — 82962 GLUCOSE BLOOD TEST: CPT

## 2022-09-26 PROCEDURE — 25010000002 ENOXAPARIN PER 10 MG: Performed by: STUDENT IN AN ORGANIZED HEALTH CARE EDUCATION/TRAINING PROGRAM

## 2022-09-26 PROCEDURE — 63710000001 INSULIN LISPRO (HUMAN) PER 5 UNITS: Performed by: HOSPITALIST

## 2022-09-26 RX ORDER — LIDOCAINE HYDROCHLORIDE 10 MG/ML
10 INJECTION, SOLUTION INFILTRATION; PERINEURAL ONCE
Status: COMPLETED | OUTPATIENT
Start: 2022-09-26 | End: 2022-09-26

## 2022-09-26 RX ADMIN — INSULIN LISPRO 4 UNITS: 100 INJECTION, SOLUTION INTRAVENOUS; SUBCUTANEOUS at 17:40

## 2022-09-26 RX ADMIN — ASPIRIN 81 MG: 81 TABLET, CHEWABLE ORAL at 12:26

## 2022-09-26 RX ADMIN — POLYETHYLENE GLYCOL 3350 17 G: 17 POWDER, FOR SOLUTION ORAL at 12:21

## 2022-09-26 RX ADMIN — INSULIN GLARGINE-YFGN 20 UNITS: 100 INJECTION, SOLUTION SUBCUTANEOUS at 12:27

## 2022-09-26 RX ADMIN — LIDOCAINE HYDROCHLORIDE 7 ML: 10 INJECTION, SOLUTION INFILTRATION; PERINEURAL at 10:17

## 2022-09-26 RX ADMIN — ENOXAPARIN SODIUM 40 MG: 100 INJECTION SUBCUTANEOUS at 17:41

## 2022-09-26 RX ADMIN — HYDROCODONE BITARTRATE AND ACETAMINOPHEN 1 TABLET: 5; 325 TABLET ORAL at 09:29

## 2022-09-26 RX ADMIN — PANTOPRAZOLE SODIUM 40 MG: 40 TABLET, DELAYED RELEASE ORAL at 09:29

## 2022-09-26 RX ADMIN — ACETAMINOPHEN 650 MG: 325 TABLET, FILM COATED ORAL at 20:21

## 2022-09-26 RX ADMIN — ATORVASTATIN CALCIUM 20 MG: 20 TABLET, FILM COATED ORAL at 09:29

## 2022-09-26 RX ADMIN — Medication 10 ML: at 21:50

## 2022-09-26 RX ADMIN — SENNOSIDES 2 TABLET: 8.6 TABLET, FILM COATED ORAL at 20:20

## 2022-09-26 RX ADMIN — Medication 10 ML: at 12:26

## 2022-09-26 RX ADMIN — INSULIN GLARGINE-YFGN 20 UNITS: 100 INJECTION, SOLUTION SUBCUTANEOUS at 21:49

## 2022-09-26 RX ADMIN — INSULIN LISPRO 4 UNITS: 100 INJECTION, SOLUTION INTRAVENOUS; SUBCUTANEOUS at 12:21

## 2022-09-27 ENCOUNTER — HOSPITAL ENCOUNTER (OUTPATIENT)
Facility: HOSPITAL | Age: 62
Setting detail: HOSPITAL OUTPATIENT SURGERY
End: 2022-09-27
Attending: SURGERY | Admitting: SURGERY

## 2022-09-27 PROBLEM — C57.00: Status: ACTIVE | Noted: 2022-09-27

## 2022-09-27 LAB
ANION GAP SERPL CALCULATED.3IONS-SCNC: 10 MMOL/L (ref 5–15)
BUN SERPL-MCNC: 16 MG/DL (ref 8–23)
BUN/CREAT SERPL: 19 (ref 7–25)
CALCIUM SPEC-SCNC: 8.7 MG/DL (ref 8.6–10.5)
CHLORIDE SERPL-SCNC: 101 MMOL/L (ref 98–107)
CO2 SERPL-SCNC: 26 MMOL/L (ref 22–29)
CREAT SERPL-MCNC: 0.84 MG/DL (ref 0.57–1)
DEPRECATED RDW RBC AUTO: 43.8 FL (ref 37–54)
EGFRCR SERPLBLD CKD-EPI 2021: 79.2 ML/MIN/1.73
ERYTHROCYTE [DISTWIDTH] IN BLOOD BY AUTOMATED COUNT: 13.5 % (ref 12.3–15.4)
GLUCOSE BLDC GLUCOMTR-MCNC: 127 MG/DL (ref 70–130)
GLUCOSE BLDC GLUCOMTR-MCNC: 150 MG/DL (ref 70–130)
GLUCOSE BLDC GLUCOMTR-MCNC: 165 MG/DL (ref 70–130)
GLUCOSE SERPL-MCNC: 124 MG/DL (ref 65–99)
HCT VFR BLD AUTO: 40.1 % (ref 34–46.6)
HGB BLD-MCNC: 12.6 G/DL (ref 12–15.9)
MCH RBC QN AUTO: 28.1 PG (ref 26.6–33)
MCHC RBC AUTO-ENTMCNC: 31.4 G/DL (ref 31.5–35.7)
MCV RBC AUTO: 89.3 FL (ref 79–97)
PLATELET # BLD AUTO: 390 10*3/MM3 (ref 140–450)
PMV BLD AUTO: 10.8 FL (ref 6–12)
POTASSIUM SERPL-SCNC: 4.5 MMOL/L (ref 3.5–5.2)
RBC # BLD AUTO: 4.49 10*6/MM3 (ref 3.77–5.28)
SODIUM SERPL-SCNC: 137 MMOL/L (ref 136–145)
WBC NRBC COR # BLD: 9.37 10*3/MM3 (ref 3.4–10.8)

## 2022-09-27 PROCEDURE — 82962 GLUCOSE BLOOD TEST: CPT

## 2022-09-27 PROCEDURE — 99232 SBSQ HOSP IP/OBS MODERATE 35: CPT | Performed by: SURGERY

## 2022-09-27 PROCEDURE — 25010000002 ENOXAPARIN PER 10 MG: Performed by: STUDENT IN AN ORGANIZED HEALTH CARE EDUCATION/TRAINING PROGRAM

## 2022-09-27 PROCEDURE — 80048 BASIC METABOLIC PNL TOTAL CA: CPT | Performed by: STUDENT IN AN ORGANIZED HEALTH CARE EDUCATION/TRAINING PROGRAM

## 2022-09-27 PROCEDURE — 99233 SBSQ HOSP IP/OBS HIGH 50: CPT | Performed by: OBSTETRICS & GYNECOLOGY

## 2022-09-27 PROCEDURE — 63710000001 INSULIN LISPRO (HUMAN) PER 5 UNITS: Performed by: HOSPITALIST

## 2022-09-27 PROCEDURE — 85027 COMPLETE CBC AUTOMATED: CPT | Performed by: STUDENT IN AN ORGANIZED HEALTH CARE EDUCATION/TRAINING PROGRAM

## 2022-09-27 RX ADMIN — PANTOPRAZOLE SODIUM 40 MG: 40 TABLET, DELAYED RELEASE ORAL at 08:27

## 2022-09-27 RX ADMIN — Medication 10 ML: at 22:49

## 2022-09-27 RX ADMIN — ATORVASTATIN CALCIUM 20 MG: 20 TABLET, FILM COATED ORAL at 08:27

## 2022-09-27 RX ADMIN — ASPIRIN 81 MG: 81 TABLET, CHEWABLE ORAL at 08:27

## 2022-09-27 RX ADMIN — ACETAMINOPHEN 650 MG: 325 TABLET, FILM COATED ORAL at 22:48

## 2022-09-27 RX ADMIN — INSULIN LISPRO 2 UNITS: 100 INJECTION, SOLUTION INTRAVENOUS; SUBCUTANEOUS at 12:55

## 2022-09-27 RX ADMIN — INSULIN LISPRO 4 UNITS: 100 INJECTION, SOLUTION INTRAVENOUS; SUBCUTANEOUS at 12:55

## 2022-09-27 RX ADMIN — INSULIN GLARGINE-YFGN 20 UNITS: 100 INJECTION, SOLUTION SUBCUTANEOUS at 22:47

## 2022-09-27 RX ADMIN — Medication 10 ML: at 12:56

## 2022-09-27 RX ADMIN — POLYETHYLENE GLYCOL 3350 17 G: 17 POWDER, FOR SOLUTION ORAL at 08:28

## 2022-09-27 RX ADMIN — INSULIN LISPRO 4 UNITS: 100 INJECTION, SOLUTION INTRAVENOUS; SUBCUTANEOUS at 08:28

## 2022-09-27 RX ADMIN — INSULIN GLARGINE-YFGN 20 UNITS: 100 INJECTION, SOLUTION SUBCUTANEOUS at 08:29

## 2022-09-27 RX ADMIN — ENOXAPARIN SODIUM 40 MG: 100 INJECTION SUBCUTANEOUS at 12:55

## 2022-09-28 ENCOUNTER — ANESTHESIA (OUTPATIENT)
Dept: PERIOP | Facility: HOSPITAL | Age: 62
End: 2022-09-28

## 2022-09-28 ENCOUNTER — APPOINTMENT (OUTPATIENT)
Dept: GENERAL RADIOLOGY | Facility: HOSPITAL | Age: 62
End: 2022-09-28

## 2022-09-28 ENCOUNTER — ANESTHESIA EVENT (OUTPATIENT)
Dept: PERIOP | Facility: HOSPITAL | Age: 62
End: 2022-09-28

## 2022-09-28 LAB
ALBUMIN SERPL-MCNC: 2.5 G/DL (ref 3.5–5.2)
ALBUMIN/GLOB SERPL: 0.8 G/DL
ALP SERPL-CCNC: 64 U/L (ref 39–117)
ALT SERPL W P-5'-P-CCNC: 9 U/L (ref 1–33)
ANION GAP SERPL CALCULATED.3IONS-SCNC: 10 MMOL/L (ref 5–15)
ANION GAP SERPL CALCULATED.3IONS-SCNC: 14.7 MMOL/L (ref 5–15)
AST SERPL-CCNC: 20 U/L (ref 1–32)
BASOPHILS # BLD AUTO: 0.08 10*3/MM3 (ref 0–0.2)
BASOPHILS NFR BLD AUTO: 0.9 % (ref 0–1.5)
BILIRUB SERPL-MCNC: <0.2 MG/DL (ref 0–1.2)
BUN SERPL-MCNC: 14 MG/DL (ref 8–23)
BUN SERPL-MCNC: 14 MG/DL (ref 8–23)
BUN/CREAT SERPL: 14.7 (ref 7–25)
BUN/CREAT SERPL: 16.3 (ref 7–25)
CALCIUM SPEC-SCNC: 8.6 MG/DL (ref 8.6–10.5)
CALCIUM SPEC-SCNC: 9 MG/DL (ref 8.6–10.5)
CHLORIDE SERPL-SCNC: 103 MMOL/L (ref 98–107)
CHLORIDE SERPL-SCNC: 104 MMOL/L (ref 98–107)
CO2 SERPL-SCNC: 22.3 MMOL/L (ref 22–29)
CO2 SERPL-SCNC: 26 MMOL/L (ref 22–29)
CREAT SERPL-MCNC: 0.86 MG/DL (ref 0.57–1)
CREAT SERPL-MCNC: 0.95 MG/DL (ref 0.57–1)
DEPRECATED RDW RBC AUTO: 41.6 FL (ref 37–54)
DEPRECATED RDW RBC AUTO: 45.6 FL (ref 37–54)
EGFRCR SERPLBLD CKD-EPI 2021: 68.3 ML/MIN/1.73
EGFRCR SERPLBLD CKD-EPI 2021: 77 ML/MIN/1.73
EOSINOPHIL # BLD AUTO: 0.17 10*3/MM3 (ref 0–0.4)
EOSINOPHIL NFR BLD AUTO: 1.9 % (ref 0.3–6.2)
ERYTHROCYTE [DISTWIDTH] IN BLOOD BY AUTOMATED COUNT: 13.2 % (ref 12.3–15.4)
ERYTHROCYTE [DISTWIDTH] IN BLOOD BY AUTOMATED COUNT: 13.7 % (ref 12.3–15.4)
GLOBULIN UR ELPH-MCNC: 3 GM/DL
GLUCOSE BLDC GLUCOMTR-MCNC: 103 MG/DL (ref 70–130)
GLUCOSE BLDC GLUCOMTR-MCNC: 110 MG/DL (ref 70–130)
GLUCOSE BLDC GLUCOMTR-MCNC: 126 MG/DL (ref 70–130)
GLUCOSE BLDC GLUCOMTR-MCNC: 141 MG/DL (ref 70–130)
GLUCOSE BLDC GLUCOMTR-MCNC: 91 MG/DL (ref 70–130)
GLUCOSE SERPL-MCNC: 127 MG/DL (ref 65–99)
GLUCOSE SERPL-MCNC: 142 MG/DL (ref 65–99)
HCT VFR BLD AUTO: 39.3 % (ref 34–46.6)
HCT VFR BLD AUTO: 42 % (ref 34–46.6)
HGB BLD-MCNC: 13 G/DL (ref 12–15.9)
HGB BLD-MCNC: 13 G/DL (ref 12–15.9)
IMM GRANULOCYTES # BLD AUTO: 0.03 10*3/MM3 (ref 0–0.05)
IMM GRANULOCYTES NFR BLD AUTO: 0.3 % (ref 0–0.5)
LYMPHOCYTES # BLD AUTO: 1.57 10*3/MM3 (ref 0.7–3.1)
LYMPHOCYTES NFR BLD AUTO: 17.2 % (ref 19.6–45.3)
MCH RBC QN AUTO: 28.2 PG (ref 26.6–33)
MCH RBC QN AUTO: 28.4 PG (ref 26.6–33)
MCHC RBC AUTO-ENTMCNC: 31 G/DL (ref 31.5–35.7)
MCHC RBC AUTO-ENTMCNC: 33.1 G/DL (ref 31.5–35.7)
MCV RBC AUTO: 85.8 FL (ref 79–97)
MCV RBC AUTO: 91.1 FL (ref 79–97)
MONOCYTES # BLD AUTO: 1.02 10*3/MM3 (ref 0.1–0.9)
MONOCYTES NFR BLD AUTO: 11.2 % (ref 5–12)
NEUTROPHILS NFR BLD AUTO: 6.26 10*3/MM3 (ref 1.7–7)
NEUTROPHILS NFR BLD AUTO: 68.5 % (ref 42.7–76)
NRBC BLD AUTO-RTO: 0 /100 WBC (ref 0–0.2)
PLATELET # BLD AUTO: 379 10*3/MM3 (ref 140–450)
PLATELET # BLD AUTO: 386 10*3/MM3 (ref 140–450)
PMV BLD AUTO: 10.6 FL (ref 6–12)
PMV BLD AUTO: 11.2 FL (ref 6–12)
POTASSIUM SERPL-SCNC: 4.4 MMOL/L (ref 3.5–5.2)
POTASSIUM SERPL-SCNC: 4.6 MMOL/L (ref 3.5–5.2)
PROT SERPL-MCNC: 5.5 G/DL (ref 6–8.5)
RBC # BLD AUTO: 4.58 10*6/MM3 (ref 3.77–5.28)
RBC # BLD AUTO: 4.61 10*6/MM3 (ref 3.77–5.28)
SODIUM SERPL-SCNC: 139 MMOL/L (ref 136–145)
SODIUM SERPL-SCNC: 141 MMOL/L (ref 136–145)
WBC NRBC COR # BLD: 8.78 10*3/MM3 (ref 3.4–10.8)
WBC NRBC COR # BLD: 9.13 10*3/MM3 (ref 3.4–10.8)

## 2022-09-28 PROCEDURE — 82962 GLUCOSE BLOOD TEST: CPT

## 2022-09-28 PROCEDURE — 25010000002 FOSAPREPITANT PER 1 MG: Performed by: OBSTETRICS & GYNECOLOGY

## 2022-09-28 PROCEDURE — 25010000002 HEPARIN (PORCINE) PER 1000 UNITS: Performed by: SURGERY

## 2022-09-28 PROCEDURE — 80053 COMPREHEN METABOLIC PANEL: CPT | Performed by: STUDENT IN AN ORGANIZED HEALTH CARE EDUCATION/TRAINING PROGRAM

## 2022-09-28 PROCEDURE — 25010000002 ONDANSETRON PER 1 MG: Performed by: NURSE ANESTHETIST, CERTIFIED REGISTERED

## 2022-09-28 PROCEDURE — 25010000002 DIPHENHYDRAMINE PER 50 MG: Performed by: OBSTETRICS & GYNECOLOGY

## 2022-09-28 PROCEDURE — 0JH60WZ INSERTION OF TOTALLY IMPLANTABLE VASCULAR ACCESS DEVICE INTO CHEST SUBCUTANEOUS TISSUE AND FASCIA, OPEN APPROACH: ICD-10-PCS | Performed by: SURGERY

## 2022-09-28 PROCEDURE — 25010000002 ENOXAPARIN PER 10 MG: Performed by: SURGERY

## 2022-09-28 PROCEDURE — 85025 COMPLETE CBC W/AUTO DIFF WBC: CPT | Performed by: OBSTETRICS & GYNECOLOGY

## 2022-09-28 PROCEDURE — 25010000002 PROPOFOL 10 MG/ML EMULSION: Performed by: NURSE ANESTHETIST, CERTIFIED REGISTERED

## 2022-09-28 PROCEDURE — 25010000002 DEXAMETHASONE PER 1 MG: Performed by: OBSTETRICS & GYNECOLOGY

## 2022-09-28 PROCEDURE — C1788 PORT, INDWELLING, IMP: HCPCS | Performed by: SURGERY

## 2022-09-28 PROCEDURE — 36561 INSERT TUNNELED CV CATH: CPT | Performed by: SURGERY

## 2022-09-28 PROCEDURE — 02HV33Z INSERTION OF INFUSION DEVICE INTO SUPERIOR VENA CAVA, PERCUTANEOUS APPROACH: ICD-10-PCS | Performed by: SURGERY

## 2022-09-28 PROCEDURE — 76000 FLUOROSCOPY <1 HR PHYS/QHP: CPT

## 2022-09-28 PROCEDURE — 25010000002 ONDANSETRON PER 1 MG: Performed by: OBSTETRICS & GYNECOLOGY

## 2022-09-28 PROCEDURE — 76937 US GUIDE VASCULAR ACCESS: CPT | Performed by: SURGERY

## 2022-09-28 PROCEDURE — 85027 COMPLETE CBC AUTOMATED: CPT | Performed by: STUDENT IN AN ORGANIZED HEALTH CARE EDUCATION/TRAINING PROGRAM

## 2022-09-28 PROCEDURE — 25010000002 PACLITAXEL PER 1 MG: Performed by: OBSTETRICS & GYNECOLOGY

## 2022-09-28 PROCEDURE — 25010000002 CEFAZOLIN IN DEXTROSE 2-4 GM/100ML-% SOLUTION: Performed by: SURGERY

## 2022-09-28 PROCEDURE — 25010000002 CARBOPLATIN PER 50 MG: Performed by: OBSTETRICS & GYNECOLOGY

## 2022-09-28 PROCEDURE — 77001 FLUOROGUIDE FOR VEIN DEVICE: CPT | Performed by: SURGERY

## 2022-09-28 DEVICE — PRT INTRO VASC/INTERV VORTEX FILL/HL DETACH/POLYURET/CATH 8F: Type: IMPLANTABLE DEVICE | Site: CHEST | Status: FUNCTIONAL

## 2022-09-28 RX ORDER — MIDAZOLAM HYDROCHLORIDE 1 MG/ML
1 INJECTION INTRAMUSCULAR; INTRAVENOUS
Status: DISCONTINUED | OUTPATIENT
Start: 2022-09-28 | End: 2022-09-28 | Stop reason: SDUPTHER

## 2022-09-28 RX ORDER — SODIUM CHLORIDE 0.9 % (FLUSH) 0.9 %
3 SYRINGE (ML) INJECTION EVERY 12 HOURS SCHEDULED
Status: DISCONTINUED | OUTPATIENT
Start: 2022-09-28 | End: 2022-09-28 | Stop reason: HOSPADM

## 2022-09-28 RX ORDER — FAMOTIDINE 10 MG/ML
20 INJECTION, SOLUTION INTRAVENOUS AS NEEDED
Status: DISCONTINUED | OUTPATIENT
Start: 2022-09-28 | End: 2022-10-02 | Stop reason: HOSPADM

## 2022-09-28 RX ORDER — OLANZAPINE 5 MG/1
5 TABLET ORAL ONCE
Status: CANCELLED | OUTPATIENT
Start: 2022-09-29 | End: 2022-09-29

## 2022-09-28 RX ORDER — ONDANSETRON HYDROCHLORIDE 8 MG/1
8 TABLET, FILM COATED ORAL 3 TIMES DAILY PRN
Qty: 30 TABLET | Refills: 5 | Status: SHIPPED | OUTPATIENT
Start: 2022-09-28

## 2022-09-28 RX ORDER — SODIUM CHLORIDE, SODIUM LACTATE, POTASSIUM CHLORIDE, CALCIUM CHLORIDE 600; 310; 30; 20 MG/100ML; MG/100ML; MG/100ML; MG/100ML
9 INJECTION, SOLUTION INTRAVENOUS CONTINUOUS
Status: DISCONTINUED | OUTPATIENT
Start: 2022-09-28 | End: 2022-09-28

## 2022-09-28 RX ORDER — SODIUM CHLORIDE 9 MG/ML
250 INJECTION, SOLUTION INTRAVENOUS ONCE
Status: COMPLETED | OUTPATIENT
Start: 2022-09-28 | End: 2022-09-28

## 2022-09-28 RX ORDER — SODIUM CHLORIDE 0.9 % (FLUSH) 0.9 %
3-10 SYRINGE (ML) INJECTION AS NEEDED
Status: DISCONTINUED | OUTPATIENT
Start: 2022-09-28 | End: 2022-09-28 | Stop reason: HOSPADM

## 2022-09-28 RX ORDER — HYDROMORPHONE HYDROCHLORIDE 1 MG/ML
0.5 INJECTION, SOLUTION INTRAMUSCULAR; INTRAVENOUS; SUBCUTANEOUS
Status: DISCONTINUED | OUTPATIENT
Start: 2022-09-28 | End: 2022-09-28 | Stop reason: HOSPADM

## 2022-09-28 RX ORDER — LIDOCAINE HYDROCHLORIDE 10 MG/ML
0.5 INJECTION, SOLUTION EPIDURAL; INFILTRATION; INTRACAUDAL; PERINEURAL ONCE AS NEEDED
Status: DISCONTINUED | OUTPATIENT
Start: 2022-09-28 | End: 2022-09-28 | Stop reason: HOSPADM

## 2022-09-28 RX ORDER — DIPHENHYDRAMINE HYDROCHLORIDE 50 MG/ML
50 INJECTION INTRAMUSCULAR; INTRAVENOUS AS NEEDED
Status: DISCONTINUED | OUTPATIENT
Start: 2022-09-28 | End: 2022-10-02 | Stop reason: HOSPADM

## 2022-09-28 RX ORDER — HYDROCODONE BITARTRATE AND ACETAMINOPHEN 7.5; 325 MG/1; MG/1
1 TABLET ORAL ONCE AS NEEDED
Status: DISCONTINUED | OUTPATIENT
Start: 2022-09-28 | End: 2022-09-28 | Stop reason: HOSPADM

## 2022-09-28 RX ORDER — FENTANYL CITRATE 50 UG/ML
50 INJECTION, SOLUTION INTRAMUSCULAR; INTRAVENOUS
Status: DISCONTINUED | OUTPATIENT
Start: 2022-09-28 | End: 2022-09-28 | Stop reason: HOSPADM

## 2022-09-28 RX ORDER — FAMOTIDINE 10 MG/ML
20 INJECTION, SOLUTION INTRAVENOUS ONCE
Status: COMPLETED | OUTPATIENT
Start: 2022-09-28 | End: 2022-09-28

## 2022-09-28 RX ORDER — PALONOSETRON 0.05 MG/ML
0.25 INJECTION, SOLUTION INTRAVENOUS ONCE
Status: CANCELLED | OUTPATIENT
Start: 2022-09-29

## 2022-09-28 RX ORDER — ONDANSETRON 2 MG/ML
4 INJECTION INTRAMUSCULAR; INTRAVENOUS ONCE AS NEEDED
Status: COMPLETED | OUTPATIENT
Start: 2022-09-28 | End: 2022-09-28

## 2022-09-28 RX ORDER — FAMOTIDINE 10 MG/ML
20 INJECTION, SOLUTION INTRAVENOUS ONCE
Status: DISCONTINUED | OUTPATIENT
Start: 2022-09-28 | End: 2022-09-28 | Stop reason: SDUPTHER

## 2022-09-28 RX ORDER — PROPOFOL 10 MG/ML
VIAL (ML) INTRAVENOUS AS NEEDED
Status: DISCONTINUED | OUTPATIENT
Start: 2022-09-28 | End: 2022-09-28 | Stop reason: SURG

## 2022-09-28 RX ORDER — PROMETHAZINE HYDROCHLORIDE 25 MG/1
25 TABLET ORAL ONCE AS NEEDED
Status: DISCONTINUED | OUTPATIENT
Start: 2022-09-28 | End: 2022-09-28 | Stop reason: HOSPADM

## 2022-09-28 RX ORDER — LIDOCAINE HYDROCHLORIDE 20 MG/ML
INJECTION, SOLUTION EPIDURAL; INFILTRATION; INTRACAUDAL; PERINEURAL AS NEEDED
Status: DISCONTINUED | OUTPATIENT
Start: 2022-09-28 | End: 2022-09-28 | Stop reason: SURG

## 2022-09-28 RX ORDER — BUPIVACAINE HYDROCHLORIDE AND EPINEPHRINE 5; 5 MG/ML; UG/ML
INJECTION, SOLUTION EPIDURAL; INTRACAUDAL; PERINEURAL AS NEEDED
Status: DISCONTINUED | OUTPATIENT
Start: 2022-09-28 | End: 2022-09-28 | Stop reason: HOSPADM

## 2022-09-28 RX ORDER — DIPHENHYDRAMINE HCL 25 MG
25 CAPSULE ORAL
Status: DISCONTINUED | OUTPATIENT
Start: 2022-09-28 | End: 2022-09-28 | Stop reason: HOSPADM

## 2022-09-28 RX ORDER — PROMETHAZINE HYDROCHLORIDE 25 MG/1
25 SUPPOSITORY RECTAL ONCE AS NEEDED
Status: DISCONTINUED | OUTPATIENT
Start: 2022-09-28 | End: 2022-09-28 | Stop reason: HOSPADM

## 2022-09-28 RX ORDER — HYDRALAZINE HYDROCHLORIDE 20 MG/ML
5 INJECTION INTRAMUSCULAR; INTRAVENOUS
Status: DISCONTINUED | OUTPATIENT
Start: 2022-09-28 | End: 2022-09-28 | Stop reason: HOSPADM

## 2022-09-28 RX ORDER — OLANZAPINE 5 MG/1
5 TABLET ORAL NIGHTLY
Qty: 3 TABLET | Refills: 5 | Status: SHIPPED | OUTPATIENT
Start: 2022-09-28

## 2022-09-28 RX ORDER — CEFAZOLIN SODIUM 2 G/100ML
2 INJECTION, SOLUTION INTRAVENOUS ONCE
Status: COMPLETED | OUTPATIENT
Start: 2022-09-28 | End: 2022-09-28

## 2022-09-28 RX ORDER — OLANZAPINE 5 MG/1
5 TABLET ORAL ONCE
Status: COMPLETED | OUTPATIENT
Start: 2022-09-28 | End: 2022-09-28

## 2022-09-28 RX ORDER — OXYCODONE AND ACETAMINOPHEN 7.5; 325 MG/1; MG/1
1 TABLET ORAL EVERY 4 HOURS PRN
Status: DISCONTINUED | OUTPATIENT
Start: 2022-09-28 | End: 2022-09-28 | Stop reason: HOSPADM

## 2022-09-28 RX ORDER — DIPHENHYDRAMINE HYDROCHLORIDE 50 MG/ML
12.5 INJECTION INTRAMUSCULAR; INTRAVENOUS
Status: DISCONTINUED | OUTPATIENT
Start: 2022-09-28 | End: 2022-09-28 | Stop reason: HOSPADM

## 2022-09-28 RX ORDER — NALOXONE HCL 0.4 MG/ML
0.2 VIAL (ML) INJECTION AS NEEDED
Status: DISCONTINUED | OUTPATIENT
Start: 2022-09-28 | End: 2022-09-28 | Stop reason: HOSPADM

## 2022-09-28 RX ORDER — FLUMAZENIL 0.1 MG/ML
0.2 INJECTION INTRAVENOUS AS NEEDED
Status: DISCONTINUED | OUTPATIENT
Start: 2022-09-28 | End: 2022-09-28 | Stop reason: HOSPADM

## 2022-09-28 RX ORDER — FENTANYL CITRATE 50 UG/ML
50 INJECTION, SOLUTION INTRAMUSCULAR; INTRAVENOUS
Status: DISCONTINUED | OUTPATIENT
Start: 2022-09-28 | End: 2022-09-28 | Stop reason: SDUPTHER

## 2022-09-28 RX ORDER — FAMOTIDINE 10 MG/ML
20 INJECTION, SOLUTION INTRAVENOUS AS NEEDED
Status: CANCELLED | OUTPATIENT
Start: 2022-09-29

## 2022-09-28 RX ORDER — EPHEDRINE SULFATE 50 MG/ML
5 INJECTION, SOLUTION INTRAVENOUS ONCE AS NEEDED
Status: DISCONTINUED | OUTPATIENT
Start: 2022-09-28 | End: 2022-09-28 | Stop reason: HOSPADM

## 2022-09-28 RX ORDER — LIDOCAINE HYDROCHLORIDE 10 MG/ML
0.5 INJECTION, SOLUTION EPIDURAL; INFILTRATION; INTRACAUDAL; PERINEURAL ONCE AS NEEDED
Status: DISCONTINUED | OUTPATIENT
Start: 2022-09-28 | End: 2022-09-28 | Stop reason: SDUPTHER

## 2022-09-28 RX ORDER — DIPHENHYDRAMINE HYDROCHLORIDE 50 MG/ML
50 INJECTION INTRAMUSCULAR; INTRAVENOUS AS NEEDED
Status: CANCELLED | OUTPATIENT
Start: 2022-09-29

## 2022-09-28 RX ORDER — FAMOTIDINE 10 MG/ML
20 INJECTION, SOLUTION INTRAVENOUS ONCE
Status: CANCELLED | OUTPATIENT
Start: 2022-09-29

## 2022-09-28 RX ORDER — SODIUM CHLORIDE 9 MG/ML
250 INJECTION, SOLUTION INTRAVENOUS ONCE
Status: CANCELLED | OUTPATIENT
Start: 2022-09-29

## 2022-09-28 RX ORDER — MIDAZOLAM HYDROCHLORIDE 1 MG/ML
1 INJECTION INTRAMUSCULAR; INTRAVENOUS
Status: DISCONTINUED | OUTPATIENT
Start: 2022-09-28 | End: 2022-09-28 | Stop reason: HOSPADM

## 2022-09-28 RX ORDER — PALONOSETRON 0.05 MG/ML
0.25 INJECTION, SOLUTION INTRAVENOUS ONCE
Status: DISCONTINUED | OUTPATIENT
Start: 2022-09-28 | End: 2022-09-28

## 2022-09-28 RX ORDER — MAGNESIUM HYDROXIDE 1200 MG/15ML
LIQUID ORAL AS NEEDED
Status: DISCONTINUED | OUTPATIENT
Start: 2022-09-28 | End: 2022-09-28 | Stop reason: HOSPADM

## 2022-09-28 RX ADMIN — DIPHENHYDRAMINE HYDROCHLORIDE 50 MG: 50 INJECTION, SOLUTION INTRAMUSCULAR; INTRAVENOUS at 16:27

## 2022-09-28 RX ADMIN — CARBOPLATIN 600 MG: 10 INJECTION, SOLUTION INTRAVENOUS at 21:29

## 2022-09-28 RX ADMIN — FAMOTIDINE 20 MG: 10 INJECTION INTRAVENOUS at 07:30

## 2022-09-28 RX ADMIN — ONDANSETRON: 2 INJECTION INTRAMUSCULAR; INTRAVENOUS at 16:28

## 2022-09-28 RX ADMIN — FOSAPREPITANT 150 MG: 150 INJECTION, POWDER, LYOPHILIZED, FOR SOLUTION INTRAVENOUS at 16:27

## 2022-09-28 RX ADMIN — Medication 10 ML: at 21:38

## 2022-09-28 RX ADMIN — ONDANSETRON 4 MG: 2 INJECTION INTRAMUSCULAR; INTRAVENOUS at 09:56

## 2022-09-28 RX ADMIN — PROPOFOL 100 MG: 10 INJECTION, EMULSION INTRAVENOUS at 08:06

## 2022-09-28 RX ADMIN — ENOXAPARIN SODIUM 40 MG: 100 INJECTION SUBCUTANEOUS at 13:14

## 2022-09-28 RX ADMIN — SODIUM CHLORIDE, POTASSIUM CHLORIDE, SODIUM LACTATE AND CALCIUM CHLORIDE 9 ML/HR: 600; 310; 30; 20 INJECTION, SOLUTION INTRAVENOUS at 07:31

## 2022-09-28 RX ADMIN — SODIUM CHLORIDE 250 ML: 9 INJECTION, SOLUTION INTRAVENOUS at 16:33

## 2022-09-28 RX ADMIN — OLANZAPINE 5 MG: 5 TABLET ORAL at 16:54

## 2022-09-28 RX ADMIN — PANTOPRAZOLE SODIUM 40 MG: 40 TABLET, DELAYED RELEASE ORAL at 10:32

## 2022-09-28 RX ADMIN — PROPOFOL 100 MCG/KG/MIN: 10 INJECTION, EMULSION INTRAVENOUS at 08:06

## 2022-09-28 RX ADMIN — FAMOTIDINE 20 MG: 10 INJECTION INTRAVENOUS at 16:54

## 2022-09-28 RX ADMIN — PACLITAXEL 290 MG: 6 INJECTION, SOLUTION, CONCENTRATE INTRAVENOUS at 17:50

## 2022-09-28 RX ADMIN — LIDOCAINE HYDROCHLORIDE 100 MG: 20 INJECTION, SOLUTION EPIDURAL; INFILTRATION; INTRACAUDAL; PERINEURAL at 08:06

## 2022-09-28 RX ADMIN — CEFAZOLIN SODIUM 2 G: 2 INJECTION, SOLUTION INTRAVENOUS at 07:48

## 2022-09-28 NOTE — ANESTHESIA POSTPROCEDURE EVALUATION
Patient: Joanne Contreras    Procedure Summary     Date: 09/28/22 Room / Location: [facility] OR  / [facility] MAIN OR    Anesthesia Start: 0754 Anesthesia Stop: 0909    Procedure: INSERTION VENOUS ACCESS DEVICE (Right Chest) Diagnosis:       Fallopian tube carcinoma, unspecified laterality (HCC)      (Fallopian tube carcinoma, unspecified laterality (HCC) [C57.00])    Surgeons: Adama Barlow MD Provider: Weston Gonzales MD    Anesthesia Type: MAC ASA Status: 4          Anesthesia Type: MAC    Vitals  Vitals Value Taken Time   /73 09/28/22 0931   Temp 36.9 °C (98.5 °F) 09/28/22 0902   Pulse 81 09/28/22 0938   Resp 16 09/28/22 0930   SpO2 95 % 09/28/22 0938   Vitals shown include unvalidated device data.        Post Anesthesia Care and Evaluation    Patient location during evaluation: PACU  Patient participation: complete - patient participated  Level of consciousness: awake and alert  Pain management: adequate    Airway patency: patent  Anesthetic complications: No anesthetic complications  PONV Status: controlled  Cardiovascular status: acceptable and hemodynamically stable  Respiratory status: acceptable  Hydration status: acceptable    Comments: /73   Pulse 86   Temp 36.9 °C (98.5 °F) (Oral)   Resp 16   Wt 82.2 kg (181 lb 3.5 oz)   SpO2 97%   BMI 43.69 kg/m²

## 2022-09-28 NOTE — ANESTHESIA PREPROCEDURE EVALUATION
Anesthesia Evaluation     NPO Solid Status: > 8 hours  NPO Liquid Status: > 2 hours           Airway   Dental      Pulmonary    Cardiovascular     (+) hypertension, CAD, PVD,       Neuro/Psych  GI/Hepatic/Renal/Endo    (+) morbid obesity,  liver disease,     Musculoskeletal     Abdominal    Substance History      OB/GYN          Other      history of cancer active                    Anesthesia Plan    ASA 4     MAC       Anesthetic plan, risks, benefits, and alternatives have been provided, discussed and informed consent has been obtained with: patient.    Plan discussed with CRNA.        CODE STATUS:    Level Of Support Discussed With: Patient  Code Status (Patient has no pulse and is not breathing): CPR (Attempt to Resuscitate)  Medical Interventions (Patient has pulse or is breathing): Full Support  Release to patient: Routine Release

## 2022-09-29 LAB
GLUCOSE BLDC GLUCOMTR-MCNC: 162 MG/DL (ref 70–130)
GLUCOSE BLDC GLUCOMTR-MCNC: 208 MG/DL (ref 70–130)
GLUCOSE BLDC GLUCOMTR-MCNC: 215 MG/DL (ref 70–130)
GLUCOSE BLDC GLUCOMTR-MCNC: 243 MG/DL (ref 70–130)

## 2022-09-29 PROCEDURE — 63710000001 INSULIN LISPRO (HUMAN) PER 5 UNITS: Performed by: SURGERY

## 2022-09-29 PROCEDURE — 25010000002 ENOXAPARIN PER 10 MG: Performed by: SURGERY

## 2022-09-29 PROCEDURE — 82962 GLUCOSE BLOOD TEST: CPT

## 2022-09-29 RX ORDER — SODIUM CHLORIDE 0.9 % (FLUSH) 0.9 %
10 SYRINGE (ML) INJECTION AS NEEDED
Status: DISCONTINUED | OUTPATIENT
Start: 2022-09-29 | End: 2022-10-02 | Stop reason: HOSPADM

## 2022-09-29 RX ORDER — SODIUM CHLORIDE 0.9 % (FLUSH) 0.9 %
10 SYRINGE (ML) INJECTION EVERY 12 HOURS SCHEDULED
Status: DISCONTINUED | OUTPATIENT
Start: 2022-09-29 | End: 2022-10-02 | Stop reason: HOSPADM

## 2022-09-29 RX ORDER — SODIUM CHLORIDE 0.9 % (FLUSH) 0.9 %
20 SYRINGE (ML) INJECTION AS NEEDED
Status: DISCONTINUED | OUTPATIENT
Start: 2022-09-29 | End: 2022-10-02 | Stop reason: HOSPADM

## 2022-09-29 RX ORDER — HEPARIN SODIUM (PORCINE) LOCK FLUSH IV SOLN 100 UNIT/ML 100 UNIT/ML
5 SOLUTION INTRAVENOUS AS NEEDED
Status: DISCONTINUED | OUTPATIENT
Start: 2022-09-29 | End: 2022-10-02 | Stop reason: HOSPADM

## 2022-09-29 RX ADMIN — Medication 10 ML: at 12:15

## 2022-09-29 RX ADMIN — PANTOPRAZOLE SODIUM 40 MG: 40 TABLET, DELAYED RELEASE ORAL at 09:06

## 2022-09-29 RX ADMIN — INSULIN LISPRO 4 UNITS: 100 INJECTION, SOLUTION INTRAVENOUS; SUBCUTANEOUS at 12:15

## 2022-09-29 RX ADMIN — Medication 10 ML: at 23:13

## 2022-09-29 RX ADMIN — INSULIN LISPRO 3 UNITS: 100 INJECTION, SOLUTION INTRAVENOUS; SUBCUTANEOUS at 12:15

## 2022-09-29 RX ADMIN — INSULIN LISPRO 4 UNITS: 100 INJECTION, SOLUTION INTRAVENOUS; SUBCUTANEOUS at 18:08

## 2022-09-29 RX ADMIN — ATORVASTATIN CALCIUM 20 MG: 20 TABLET, FILM COATED ORAL at 09:06

## 2022-09-29 RX ADMIN — INSULIN GLARGINE-YFGN 20 UNITS: 100 INJECTION, SOLUTION SUBCUTANEOUS at 23:11

## 2022-09-29 RX ADMIN — INSULIN LISPRO 3 UNITS: 100 INJECTION, SOLUTION INTRAVENOUS; SUBCUTANEOUS at 18:08

## 2022-09-29 RX ADMIN — SENNOSIDES 2 TABLET: 8.6 TABLET, FILM COATED ORAL at 23:11

## 2022-09-29 RX ADMIN — INSULIN LISPRO 4 UNITS: 100 INJECTION, SOLUTION INTRAVENOUS; SUBCUTANEOUS at 09:07

## 2022-09-29 RX ADMIN — Medication 10 ML: at 09:08

## 2022-09-29 RX ADMIN — ASPIRIN 81 MG: 81 TABLET, CHEWABLE ORAL at 09:06

## 2022-09-29 RX ADMIN — Medication 10 ML: at 23:11

## 2022-09-29 RX ADMIN — GLYCERIN 1 DROP: .002; .002; .01 SOLUTION/ DROPS OPHTHALMIC at 23:13

## 2022-09-29 RX ADMIN — ENOXAPARIN SODIUM 40 MG: 100 INJECTION SUBCUTANEOUS at 12:16

## 2022-09-29 RX ADMIN — INSULIN GLARGINE-YFGN 20 UNITS: 100 INJECTION, SOLUTION SUBCUTANEOUS at 09:06

## 2022-09-30 LAB
ALBUMIN SERPL-MCNC: 2.3 G/DL (ref 3.5–5.2)
ALBUMIN/GLOB SERPL: 0.8 G/DL
ALP SERPL-CCNC: 75 U/L (ref 39–117)
ALT SERPL W P-5'-P-CCNC: 44 U/L (ref 1–33)
ANION GAP SERPL CALCULATED.3IONS-SCNC: 6.7 MMOL/L (ref 5–15)
AST SERPL-CCNC: 135 U/L (ref 1–32)
BASOPHILS # BLD AUTO: 0.01 10*3/MM3 (ref 0–0.2)
BASOPHILS NFR BLD AUTO: 0.1 % (ref 0–1.5)
BILIRUB SERPL-MCNC: <0.2 MG/DL (ref 0–1.2)
BUN SERPL-MCNC: 21 MG/DL (ref 8–23)
BUN/CREAT SERPL: 25.9 (ref 7–25)
CALCIUM SPEC-SCNC: 7.9 MG/DL (ref 8.6–10.5)
CHLORIDE SERPL-SCNC: 104 MMOL/L (ref 98–107)
CO2 SERPL-SCNC: 29.3 MMOL/L (ref 22–29)
CREAT SERPL-MCNC: 0.81 MG/DL (ref 0.57–1)
DEPRECATED RDW RBC AUTO: 42.7 FL (ref 37–54)
EGFRCR SERPLBLD CKD-EPI 2021: 82.7 ML/MIN/1.73
EOSINOPHIL # BLD AUTO: 0 10*3/MM3 (ref 0–0.4)
EOSINOPHIL NFR BLD AUTO: 0 % (ref 0.3–6.2)
ERYTHROCYTE [DISTWIDTH] IN BLOOD BY AUTOMATED COUNT: 13.4 % (ref 12.3–15.4)
GLOBULIN UR ELPH-MCNC: 2.8 GM/DL
GLUCOSE BLDC GLUCOMTR-MCNC: 176 MG/DL (ref 70–130)
GLUCOSE BLDC GLUCOMTR-MCNC: 195 MG/DL (ref 70–130)
GLUCOSE BLDC GLUCOMTR-MCNC: 198 MG/DL (ref 70–130)
GLUCOSE BLDC GLUCOMTR-MCNC: 213 MG/DL (ref 70–130)
GLUCOSE SERPL-MCNC: 243 MG/DL (ref 65–99)
HCT VFR BLD AUTO: 35 % (ref 34–46.6)
HGB BLD-MCNC: 11.2 G/DL (ref 12–15.9)
IMM GRANULOCYTES # BLD AUTO: 0.04 10*3/MM3 (ref 0–0.05)
IMM GRANULOCYTES NFR BLD AUTO: 0.5 % (ref 0–0.5)
LYMPHOCYTES # BLD AUTO: 0.86 10*3/MM3 (ref 0.7–3.1)
LYMPHOCYTES NFR BLD AUTO: 10.4 % (ref 19.6–45.3)
MCH RBC QN AUTO: 28.1 PG (ref 26.6–33)
MCHC RBC AUTO-ENTMCNC: 32 G/DL (ref 31.5–35.7)
MCV RBC AUTO: 87.9 FL (ref 79–97)
MONOCYTES # BLD AUTO: 0.57 10*3/MM3 (ref 0.1–0.9)
MONOCYTES NFR BLD AUTO: 6.9 % (ref 5–12)
NEUTROPHILS NFR BLD AUTO: 6.79 10*3/MM3 (ref 1.7–7)
NEUTROPHILS NFR BLD AUTO: 82.1 % (ref 42.7–76)
NRBC BLD AUTO-RTO: 0 /100 WBC (ref 0–0.2)
PLATELET # BLD AUTO: 383 10*3/MM3 (ref 140–450)
PMV BLD AUTO: 10.9 FL (ref 6–12)
POTASSIUM SERPL-SCNC: 4.4 MMOL/L (ref 3.5–5.2)
PROT SERPL-MCNC: 5.1 G/DL (ref 6–8.5)
RBC # BLD AUTO: 3.98 10*6/MM3 (ref 3.77–5.28)
SODIUM SERPL-SCNC: 140 MMOL/L (ref 136–145)
WBC NRBC COR # BLD: 8.27 10*3/MM3 (ref 3.4–10.8)

## 2022-09-30 PROCEDURE — 82962 GLUCOSE BLOOD TEST: CPT

## 2022-09-30 PROCEDURE — 25010000002 ENOXAPARIN PER 10 MG: Performed by: SURGERY

## 2022-09-30 PROCEDURE — 80053 COMPREHEN METABOLIC PANEL: CPT | Performed by: INTERNAL MEDICINE

## 2022-09-30 PROCEDURE — 85025 COMPLETE CBC W/AUTO DIFF WBC: CPT | Performed by: INTERNAL MEDICINE

## 2022-09-30 PROCEDURE — 63710000001 INSULIN LISPRO (HUMAN) PER 5 UNITS: Performed by: SURGERY

## 2022-09-30 RX ADMIN — ENOXAPARIN SODIUM 40 MG: 100 INJECTION SUBCUTANEOUS at 12:34

## 2022-09-30 RX ADMIN — ASPIRIN 81 MG: 81 TABLET, CHEWABLE ORAL at 09:06

## 2022-09-30 RX ADMIN — Medication 10 ML: at 22:00

## 2022-09-30 RX ADMIN — SENNOSIDES 1 TABLET: 8.6 TABLET, FILM COATED ORAL at 21:23

## 2022-09-30 RX ADMIN — INSULIN LISPRO 4 UNITS: 100 INJECTION, SOLUTION INTRAVENOUS; SUBCUTANEOUS at 12:35

## 2022-09-30 RX ADMIN — ATORVASTATIN CALCIUM 20 MG: 20 TABLET, FILM COATED ORAL at 09:06

## 2022-09-30 RX ADMIN — Medication 10 ML: at 09:07

## 2022-09-30 RX ADMIN — INSULIN LISPRO 4 UNITS: 100 INJECTION, SOLUTION INTRAVENOUS; SUBCUTANEOUS at 09:06

## 2022-09-30 RX ADMIN — INSULIN GLARGINE-YFGN 20 UNITS: 100 INJECTION, SOLUTION SUBCUTANEOUS at 09:08

## 2022-09-30 RX ADMIN — ACETAMINOPHEN 650 MG: 325 TABLET, FILM COATED ORAL at 21:23

## 2022-09-30 RX ADMIN — PANTOPRAZOLE SODIUM 40 MG: 40 TABLET, DELAYED RELEASE ORAL at 09:06

## 2022-09-30 RX ADMIN — INSULIN LISPRO 3 UNITS: 100 INJECTION, SOLUTION INTRAVENOUS; SUBCUTANEOUS at 09:07

## 2022-09-30 RX ADMIN — INSULIN LISPRO 2 UNITS: 100 INJECTION, SOLUTION INTRAVENOUS; SUBCUTANEOUS at 17:32

## 2022-09-30 RX ADMIN — INSULIN GLARGINE-YFGN 20 UNITS: 100 INJECTION, SOLUTION SUBCUTANEOUS at 21:24

## 2022-09-30 RX ADMIN — INSULIN LISPRO 4 UNITS: 100 INJECTION, SOLUTION INTRAVENOUS; SUBCUTANEOUS at 17:31

## 2022-09-30 RX ADMIN — INSULIN LISPRO 2 UNITS: 100 INJECTION, SOLUTION INTRAVENOUS; SUBCUTANEOUS at 12:34

## 2022-10-01 ENCOUNTER — APPOINTMENT (OUTPATIENT)
Dept: GENERAL RADIOLOGY | Facility: HOSPITAL | Age: 62
End: 2022-10-01

## 2022-10-01 LAB
25(OH)D3 SERPL-MCNC: 17.4 NG/ML (ref 30–100)
ALBUMIN SERPL-MCNC: 2.7 G/DL (ref 3.5–5.2)
ALBUMIN/GLOB SERPL: 1.1 G/DL
ALP SERPL-CCNC: 70 U/L (ref 39–117)
ALT SERPL W P-5'-P-CCNC: 31 U/L (ref 1–33)
ANION GAP SERPL CALCULATED.3IONS-SCNC: 8.8 MMOL/L (ref 5–15)
AST SERPL-CCNC: 67 U/L (ref 1–32)
BASOPHILS # BLD AUTO: 0.03 10*3/MM3 (ref 0–0.2)
BASOPHILS NFR BLD AUTO: 0.6 % (ref 0–1.5)
BILIRUB SERPL-MCNC: <0.2 MG/DL (ref 0–1.2)
BUN SERPL-MCNC: 23 MG/DL (ref 8–23)
BUN/CREAT SERPL: 27.1 (ref 7–25)
CALCIUM SPEC-SCNC: 8.3 MG/DL (ref 8.6–10.5)
CHLORIDE SERPL-SCNC: 102 MMOL/L (ref 98–107)
CO2 SERPL-SCNC: 27.2 MMOL/L (ref 22–29)
CREAT SERPL-MCNC: 0.85 MG/DL (ref 0.57–1)
DEPRECATED RDW RBC AUTO: 44.9 FL (ref 37–54)
EGFRCR SERPLBLD CKD-EPI 2021: 78.1 ML/MIN/1.73
EOSINOPHIL # BLD AUTO: 0.04 10*3/MM3 (ref 0–0.4)
EOSINOPHIL NFR BLD AUTO: 0.7 % (ref 0.3–6.2)
ERYTHROCYTE [DISTWIDTH] IN BLOOD BY AUTOMATED COUNT: 13.5 % (ref 12.3–15.4)
GLOBULIN UR ELPH-MCNC: 2.4 GM/DL
GLUCOSE BLDC GLUCOMTR-MCNC: 127 MG/DL (ref 70–130)
GLUCOSE BLDC GLUCOMTR-MCNC: 142 MG/DL (ref 70–130)
GLUCOSE BLDC GLUCOMTR-MCNC: 144 MG/DL (ref 70–130)
GLUCOSE BLDC GLUCOMTR-MCNC: 162 MG/DL (ref 70–130)
GLUCOSE SERPL-MCNC: 213 MG/DL (ref 65–99)
HCT VFR BLD AUTO: 36.4 % (ref 34–46.6)
HGB BLD-MCNC: 11.5 G/DL (ref 12–15.9)
IMM GRANULOCYTES # BLD AUTO: 0.02 10*3/MM3 (ref 0–0.05)
IMM GRANULOCYTES NFR BLD AUTO: 0.4 % (ref 0–0.5)
LYMPHOCYTES # BLD AUTO: 1.45 10*3/MM3 (ref 0.7–3.1)
LYMPHOCYTES NFR BLD AUTO: 27.1 % (ref 19.6–45.3)
MCH RBC QN AUTO: 28.3 PG (ref 26.6–33)
MCHC RBC AUTO-ENTMCNC: 31.6 G/DL (ref 31.5–35.7)
MCV RBC AUTO: 89.7 FL (ref 79–97)
MONOCYTES # BLD AUTO: 0.16 10*3/MM3 (ref 0.1–0.9)
MONOCYTES NFR BLD AUTO: 3 % (ref 5–12)
NEUTROPHILS NFR BLD AUTO: 3.66 10*3/MM3 (ref 1.7–7)
NEUTROPHILS NFR BLD AUTO: 68.2 % (ref 42.7–76)
NRBC BLD AUTO-RTO: 0 /100 WBC (ref 0–0.2)
PLATELET # BLD AUTO: 324 10*3/MM3 (ref 140–450)
PMV BLD AUTO: 11.4 FL (ref 6–12)
POTASSIUM SERPL-SCNC: 4.3 MMOL/L (ref 3.5–5.2)
PROT SERPL-MCNC: 5.1 G/DL (ref 6–8.5)
RBC # BLD AUTO: 4.06 10*6/MM3 (ref 3.77–5.28)
SODIUM SERPL-SCNC: 138 MMOL/L (ref 136–145)
WBC NRBC COR # BLD: 5.36 10*3/MM3 (ref 3.4–10.8)

## 2022-10-01 PROCEDURE — 63710000001 INSULIN LISPRO (HUMAN) PER 5 UNITS: Performed by: SURGERY

## 2022-10-01 PROCEDURE — 74018 RADEX ABDOMEN 1 VIEW: CPT

## 2022-10-01 PROCEDURE — 82306 VITAMIN D 25 HYDROXY: CPT | Performed by: INTERNAL MEDICINE

## 2022-10-01 PROCEDURE — 80053 COMPREHEN METABOLIC PANEL: CPT | Performed by: INTERNAL MEDICINE

## 2022-10-01 PROCEDURE — 85025 COMPLETE CBC W/AUTO DIFF WBC: CPT | Performed by: INTERNAL MEDICINE

## 2022-10-01 PROCEDURE — 82962 GLUCOSE BLOOD TEST: CPT

## 2022-10-01 PROCEDURE — 25010000002 ENOXAPARIN PER 10 MG: Performed by: SURGERY

## 2022-10-01 RX ORDER — OXYCODONE HYDROCHLORIDE 5 MG/1
5 TABLET ORAL ONCE
Status: COMPLETED | OUTPATIENT
Start: 2022-10-01 | End: 2022-10-01

## 2022-10-01 RX ADMIN — Medication 10 ML: at 08:19

## 2022-10-01 RX ADMIN — GLYCERIN 1 DROP: .002; .002; .01 SOLUTION/ DROPS OPHTHALMIC at 21:14

## 2022-10-01 RX ADMIN — ACETAMINOPHEN 650 MG: 325 TABLET, FILM COATED ORAL at 10:25

## 2022-10-01 RX ADMIN — INSULIN LISPRO 4 UNITS: 100 INJECTION, SOLUTION INTRAVENOUS; SUBCUTANEOUS at 12:38

## 2022-10-01 RX ADMIN — ATORVASTATIN CALCIUM 20 MG: 20 TABLET, FILM COATED ORAL at 08:18

## 2022-10-01 RX ADMIN — ASPIRIN 81 MG: 81 TABLET, CHEWABLE ORAL at 08:20

## 2022-10-01 RX ADMIN — Medication 10 ML: at 21:14

## 2022-10-01 RX ADMIN — ENOXAPARIN SODIUM 40 MG: 100 INJECTION SUBCUTANEOUS at 12:39

## 2022-10-01 RX ADMIN — INSULIN GLARGINE-YFGN 20 UNITS: 100 INJECTION, SOLUTION SUBCUTANEOUS at 21:13

## 2022-10-01 RX ADMIN — ACETAMINOPHEN 650 MG: 325 TABLET, FILM COATED ORAL at 21:15

## 2022-10-01 RX ADMIN — OXYCODONE 5 MG: 5 TABLET ORAL at 13:29

## 2022-10-01 RX ADMIN — PANTOPRAZOLE SODIUM 40 MG: 40 TABLET, DELAYED RELEASE ORAL at 08:18

## 2022-10-01 RX ADMIN — INSULIN GLARGINE-YFGN 20 UNITS: 100 INJECTION, SOLUTION SUBCUTANEOUS at 08:57

## 2022-10-01 RX ADMIN — INSULIN LISPRO 4 UNITS: 100 INJECTION, SOLUTION INTRAVENOUS; SUBCUTANEOUS at 08:57

## 2022-10-01 NOTE — CONSULTS
Patient Care Team:  Jesi Spence as PCP - General (Nurse Practitioner)    Chief complaint: Hypocalcemia    Subjective     History of Present Illness   63yo with h/o DMII and PVD presented to ER with increased abd pain last week.  This had been going on for about 1 month.  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  Her calcium had decreased to 7.9 and we were asked to see her.    Review of Systems   Constitutional: Negative for chills, fatigue and fever.   Respiratory: Negative for cough and shortness of breath.    Cardiovascular: Negative for palpitations.   Gastrointestinal: Negative for diarrhea, nausea and vomiting.   Musculoskeletal: Negative for back pain and joint swelling.   Neurological: Negative for headaches.        Past Medical History:   Diagnosis Date   • Anemia    • Coronary artery disease    • Diabetes mellitus (HCC)    • Hyperlipidemia    • Hypertension    • Peripheral vascular disease, secondary (HCC)    • Stroke (HCC)    ,   Past Surgical History:   Procedure Laterality Date   • ABOVE KNEE AMPUTATION Left    • BELOW KNEE AMPUTATION Right    • TOE SURGERY     • VENOUS ACCESS DEVICE (PORT) INSERTION Right 2022    Procedure: INSERTION VENOUS ACCESS DEVICE;  Surgeon: Adama Barlow MD;  Location: [facility];  Service: General;  Laterality: Right;   ,   Family History   Problem Relation Age of Onset   • Malig Hyperthermia Neg Hx    ,   Social History     Socioeconomic History   • Marital status:    Tobacco Use   • Smoking status: Former Smoker     Quit date:      Years since quittin.7   • Smokeless tobacco: Never Used   Vaping Use   • Vaping Use: Never used   Substance and Sexual Activity   • Alcohol use: No   • Drug use: Never   • Sexual activity: Defer     E-cigarette/Vaping   • E-cigarette/Vaping Use Never User      E-cigarette/Vaping Substances     E-cigarette/Vaping Devices       ,   Medications Prior to Admission   Medication Sig  Dispense Refill Last Dose   • aspirin 81 MG chewable tablet Chew 81 mg Daily.   Past Week at Unknown time   • carvedilol (COREG) 3.125 MG tablet Take 3.125 mg by mouth 2 (Two) Times a Day With Meals.   Past Week at Unknown time   • docusate sodium (COLACE) 100 MG capsule Take 100 mg by mouth 2 (Two) Times a Day.   Past Week at Unknown time   • Multiple Vitamins-Minerals (MULTIVITAMIN WITH MINERALS) tablet tablet Take 1 tablet by mouth Daily.      • pantoprazole (PROTONIX) 40 MG EC tablet Take 40 mg by mouth Daily.   Past Week at Unknown time   • senna (SENOKOT) 8.6 MG tablet tablet Take  by mouth Every Night.      • acetaminophen (TYLENOL) 325 MG tablet Take 650 mg by mouth Every 6 (Six) Hours As Needed for Mild Pain (1-3).   Unknown at Unknown time   • apixaban (ELIQUIS) 5 MG tablet tablet Take 5 mg by mouth Every 12 (Twelve) Hours.      • atorvastatin (LIPITOR) 20 MG tablet Take 20 mg by mouth Daily.   Unknown at Unknown time   • escitalopram (LEXAPRO) 5 MG tablet Take 5 mg by mouth Daily.      • furosemide (LASIX) 40 MG tablet Take 40 mg by mouth 2 (Two) Times a Day.   Unknown at Unknown time   • gabapentin (NEURONTIN) 400 MG capsule Take 400 mg by mouth 3 (Three) Times a Day.      • insulin aspart (novoLOG) 100 UNIT/ML injection Inject 4 Units under the skin 3 (Three) Times a Day Before Meals.   Unknown at Unknown time   • Insulin Glargine (LANTUS SC) Inject 20 Units under the skin 2 (Two) Times a Day.   Unknown at Unknown time   • lisinopril (PRINIVIL,ZESTRIL) 2.5 MG tablet Take 2.5 mg by mouth Daily.   Unknown at Unknown time   • metOLazone (ZAROXOLYN) 2.5 MG tablet Take 2.5 mg by mouth Daily.      • O2 (OXYGEN) Inhale 2 L/min 1 (One) Time.      • ondansetron (ZOFRAN) 4 MG tablet Take 4 mg by mouth Every 8 (Eight) Hours As Needed for Nausea or Vomiting.      • oxyCODONE-acetaminophen (PERCOCET) 5-325 MG per tablet Take 1 tablet by mouth Every 6 (Six) Hours As Needed.      • polyethylene glycol (MIRALAX) packet  Take 17 g by mouth Daily.      • saccharomyces boulardii (FLORASTOR) 250 MG capsule Take 250 mg by mouth 2 (Two) Times a Day.      , Scheduled Meds:  aspirin, 81 mg, Oral, Daily  atorvastatin, 20 mg, Oral, Daily  enoxaparin, 40 mg, Subcutaneous, Q24H  insulin glargine, 20 Units, Subcutaneous, Q12H  insulin lispro, 0-7 Units, Subcutaneous, TID AC  insulin lispro, 4 Units, Subcutaneous, TID With Meals  pantoprazole, 40 mg, Oral, Daily  polyethylene glycol, 17 g, Oral, Daily  senna, 2 tablet, Oral, Nightly  sodium chloride, 10 mL, Intravenous, Q12H  sodium chloride, 10 mL, Intravenous, Q12H    , Continuous Infusions:  sodium chloride, , Last Rate: Stopped (22 1151)    , PRN Meds:  •  acetaminophen **OR** acetaminophen **OR** acetaminophen  •  dextrose  •  dextrose  •  diphenhydrAMINE  •  famotidine  •  fentaNYL citrate (PF)  •  glucagon (human recombinant)  •  Glycerin-Hypromellose-  •  heparin  •  hydrocortisone sodium succinate  •  midazolam  •  [] HYDROmorphone **AND** naloxone  •  ondansetron **OR** ondansetron  •  sodium chloride  •  sodium chloride  •  sodium chloride  •  sodium chloride and Allergies:  Beta adrenergic blockers    Objective     Vital Signs  Temp:  [97.2 °F (36.2 °C)-97.9 °F (36.6 °C)] 97.9 °F (36.6 °C)  Heart Rate:  [64-83] 83  Resp:  [16] 16  BP: (112-137)/(51-74) 137/74    No intake/output data recorded.  I/O last 3 completed shifts:  In: 660 [P.O.:660]  Out: -     Physical Exam  Constitutional:       Appearance: Normal appearance.   HENT:      Nose: Nose normal.      Mouth/Throat:      Mouth: Mucous membranes are moist.   Eyes:      General: No scleral icterus.     Pupils: Pupils are equal, round, and reactive to light.   Cardiovascular:      Rate and Rhythm: Normal rate and regular rhythm.      Pulses: Normal pulses.   Pulmonary:      Effort: Pulmonary effort is normal.   Abdominal:      General: Abdomen is flat.   Musculoskeletal:         General: Normal range of motion.       Right lower leg: No edema.      Left lower leg: No edema.      Comments: R BKA  L AKA   Skin:     General: Skin is warm and dry.   Neurological:      General: No focal deficit present.      Mental Status: She is alert.   Psychiatric:         Mood and Affect: Mood normal.         Results Review:    I reviewed the patient's new clinical results.    WBC WBC   Date Value Ref Range Status   10/01/2022 5.36 3.40 - 10.80 10*3/mm3 Final   09/30/2022 8.27 3.40 - 10.80 10*3/mm3 Final      HGB Hemoglobin   Date Value Ref Range Status   10/01/2022 11.5 (L) 12.0 - 15.9 g/dL Final   09/30/2022 11.2 (L) 12.0 - 15.9 g/dL Final      HCT Hematocrit   Date Value Ref Range Status   10/01/2022 36.4 34.0 - 46.6 % Final   09/30/2022 35.0 34.0 - 46.6 % Final      Platlets No results found for: LABPLAT   MCV MCV   Date Value Ref Range Status   10/01/2022 89.7 79.0 - 97.0 fL Final   09/30/2022 87.9 79.0 - 97.0 fL Final          Sodium Sodium   Date Value Ref Range Status   10/01/2022 138 136 - 145 mmol/L Final   09/30/2022 140 136 - 145 mmol/L Final      Potassium Potassium   Date Value Ref Range Status   10/01/2022 4.3 3.5 - 5.2 mmol/L Final   09/30/2022 4.4 3.5 - 5.2 mmol/L Final      Chloride Chloride   Date Value Ref Range Status   10/01/2022 102 98 - 107 mmol/L Final   09/30/2022 104 98 - 107 mmol/L Final      CO2 CO2   Date Value Ref Range Status   10/01/2022 27.2 22.0 - 29.0 mmol/L Final   09/30/2022 29.3 (H) 22.0 - 29.0 mmol/L Final      BUN BUN   Date Value Ref Range Status   10/01/2022 23 8 - 23 mg/dL Final   09/30/2022 21 8 - 23 mg/dL Final      Creatinine Creatinine   Date Value Ref Range Status   10/01/2022 0.85 0.57 - 1.00 mg/dL Final   09/30/2022 0.81 0.57 - 1.00 mg/dL Final      Calcium Calcium   Date Value Ref Range Status   10/01/2022 8.3 (L) 8.6 - 10.5 mg/dL Final   09/30/2022 7.9 (L) 8.6 - 10.5 mg/dL Final      PO4 No results found for: CAPO4   Albumin Albumin   Date Value Ref Range Status   10/01/2022 2.70 (L) 3.50  - 5.20 g/dL Final   09/30/2022 2.30 (L) 3.50 - 5.20 g/dL Final      Magnesium No results found for: MG   Uric Acid No results found for: URICACID         Assessment & Plan       Ascites    Porcelain gallbladder    Diabetes mellitus (HCC)    Hyperlipidemia    Coronary artery disease    Hypertension    Peripheral vascular disease, secondary (HCC)    Elevated cancer antigen 125 (CA-125)    Fallopian tube carcinoma, unspecified laterality (HCC)      Assessment & Plan  Hypocalcemia-  Improved today 7.9->8.3.  She notes previous vit d deficiency.  Corrects up with low albumin also.  Will monitor.  High-grade mullerian carcinoma-  Chemo started.  DMII- on insulin and ssi now.  CAD  PVD-  S/p amputation related to DM.    I discussed the patients findings and my recommendations with patient    Valentin Barr MD  10/01/22  14:16 EDT

## 2022-10-01 NOTE — PROGRESS NOTES
Name: Joanne Contreras ADMIT: 2022   : 1960  PCP: [email or web address]    MRN: [id number] LOS: 12 days   AGE/SEX: 62 y.o. female  ROOM: [facility]     Subjective   Subjective   Patient seen at bedside.       Objective   Objective   Vital Signs  Temp:  [97.1 °F (36.2 °C)-97.9 °F (36.6 °C)] 97.1 °F (36.2 °C)  Heart Rate:  [64-83] 68  Resp:  [16] 16  BP: (112-137)/(51-74) 133/74  SpO2:  [94 %-97 %] 94 %  on   ;   Device (Oxygen Therapy): room air  Body mass index is 40.85 kg/m².  Physical Exam    Constitutional:       Appearance: Normal appearance.   HENT:      Head: Normocephalic and atraumatic.   Eyes:      Extraocular Movements: Extraocular movements intact.      Pupils: Pupils are equal, round, and reactive to light.   Cardiovascular:      Rate and Rhythm: Normal rate and regular rhythm.   Pulmonary:      Effort: Pulmonary effort is normal. No respiratory distress.   Abdominal:      General: There is no distension.      Palpations: Abdomen is soft.      Tenderness: There is no abdominal tenderness.   Musculoskeletal:      Comments: Right BKA, Left AKA   Skin:     Comments: Port on right chest wall    Neurological:      General: No focal deficit present.      Mental Status: She is alert and oriented to person, place, and time.     Results Review     I reviewed the patient's new clinical results.  Results from last 7 days   Lab Units 10/01/22  0348 09/30/22  0534 22  0635 22  0613   WBC 10*3/mm3 5.36 8.27 9.13  8.78 9.37   HEMOGLOBIN g/dL 11.5* 11.2* 13.0  13.0 12.6   PLATELETS 10*3/mm3 324 383 386  379 390     Results from last 7 days   Lab Units 10/01/22  0348 09/30/22  0534 22  0635 22  0613   SODIUM mmol/L 138 140 141  139 137   POTASSIUM mmol/L 4.3 4.4 4.6  4.4 4.5   CHLORIDE mmol/L 102 104 104  103 101   CO2 mmol/L 27.2 29.3* 22.3  26.0 26.0   BUN mg/dL 23 21 14  14 16   CREATININE mg/dL 0.85 0.81 0.95  0.86 0.84   GLUCOSE mg/dL 213* 243* 127*  142* 124*   EGFR mL/min/1.73  78.1 82.7 68.3  77.0 79.2     Results from last 7 days   Lab Units 10/01/22  0348 09/30/22  0534 09/28/22  0635   ALBUMIN g/dL 2.70* 2.30* 2.50*   BILIRUBIN mg/dL <0.2 <0.2 <0.2   ALK PHOS U/L 70 75 64   AST (SGOT) U/L 67* 135* 20   ALT (SGPT) U/L 31 44* 9     Results from last 7 days   Lab Units 10/01/22  0348 09/30/22  0534 09/28/22  0635 09/27/22  0613   CALCIUM mg/dL 8.3* 7.9* 9.0  8.6 8.7   ALBUMIN g/dL 2.70* 2.30* 2.50*  --        Glucose   Date/Time Value Ref Range Status   10/01/2022 1149 142 (H) 70 - 130 mg/dL Final     Comment:     Meter: [id number] : [id number] Krystian GARCIA   10/01/2022 0828 144 (H) 70 - 130 mg/dL Final     Comment:     Meter: [id number] : [id number] Krystian GARCIA   09/30/2022 2205 195 (H) 70 - 130 mg/dL Final     Comment:     Meter: [id number] : [id number] Darnell GARCIA   09/30/2022 1646 198 (H) 70 - 130 mg/dL Final     Comment:     Meter: [id number] : [id number] Bayron GARCIA   09/30/2022 1157 176 (H) 70 - 130 mg/dL Final     Comment:     Meter: [id number] : [id number]   09/30/2022 0750 213 (H) 70 - 130 mg/dL Final     Comment:     Meter: [id number] : [id number]   09/29/2022 2112 243 (H) 70 - 130 mg/dL Final     Comment:     Meter: [id number] : [id number] Bayron GARCIA       No radiology results for the last day  Scheduled Medications  aspirin, 81 mg, Oral, Daily  atorvastatin, 20 mg, Oral, Daily  enoxaparin, 40 mg, Subcutaneous, Q24H  insulin glargine, 20 Units, Subcutaneous, Q12H  insulin lispro, 0-7 Units, Subcutaneous, TID AC  insulin lispro, 4 Units, Subcutaneous, TID With Meals  pantoprazole, 40 mg, Oral, Daily  polyethylene glycol, 17 g, Oral, Daily  senna, 2 tablet, Oral, Nightly  sodium chloride, 10 mL, Intravenous, Q12H  sodium chloride, 10 mL, Intravenous, Q12H    Infusions  sodium chloride, , Last Rate: Stopped (09/23/22 1151)    Diet  Diet Regular       Assessment/Plan     Active Hospital Problems    Diagnosis  POA    • **Ascites [R18.8]  Yes   • Fallopian tube carcinoma, unspecified laterality (HCC) [C57.00]  Unknown   • Elevated cancer antigen 125 (CA-125) [R97.1]  Unknown   • Porcelain gallbladder [K82.8]  Yes   • Diabetes mellitus (HCC) [E11.9]  Unknown   • Hyperlipidemia [E78.5]  Unknown   • Coronary artery disease [I25.10]  Unknown   • Hypertension [I10]  Unknown   • Peripheral vascular disease, secondary (HCC) [I79.8]  Unknown      Resolved Hospital Problems   No resolved problems to display.       62 y.o. female admitted with Ascites.    Assessment and plan:  1.  High-grade mullerian carcinoma, patient has ascites, status post CT-guided omental biopsy, gynecological oncology following.  Status post port placement chemotherapy started.  Patient complains of abdominal pain today, ordered KUB, ordered CT scan of abdomen/pelvis.     2.  Type 2 diabetes mellitus, continue current insulin regimen, Accu-Cheks and sliding scale insulin coverage.     3.  Coronary artery disease, continue aspirin and statin therapy.     4.  Severe peripheral vascular disease, patient is status post right BKA and left AKA in the past.  Continue aspirin and statin therapy.      5.  Hypocalcemia, status post nephrology evaluation, follow management recommendations.     6.  CODE STATUS is full code.  Further plans based on hospital course.      Dion Murphy MD  [facility]  10/01/22  16:17 EDT

## 2022-10-01 NOTE — PLAN OF CARE
Goal Outcome Evaluation:           Progress: no change  Outcome Evaluation: Patient alert and oriented, on room air, having increased abd pain and swelling KUB ordered one time dose of Roxicodone given, blood sugars treated PRN see MAR, tolerating regular diet, port site CDI good blood return, vitals stable

## 2022-10-01 NOTE — PLAN OF CARE
Problem: Adult Inpatient Plan of Care  Goal: Plan of Care Review  Outcome: Ongoing, Progressing  Goal: Patient-Specific Goal (Individualized)  Outcome: Ongoing, Progressing  Goal: Absence of Hospital-Acquired Illness or Injury  Outcome: Ongoing, Progressing  Intervention: Identify and Manage Fall Risk  Recent Flowsheet Documentation  Taken 10/1/2022 0030 by Alondra Hyde RN  Safety Promotion/Fall Prevention: safety round/check completed  Taken 9/30/2022 2123 by Alondra Hyde RN  Safety Promotion/Fall Prevention: safety round/check completed  Taken 9/30/2022 2010 by Alondra Hyde RN  Safety Promotion/Fall Prevention: safety round/check completed  Intervention: Prevent Skin Injury  Recent Flowsheet Documentation  Taken 9/30/2022 2123 by Alondra Hyde RN  Body Position: position changed independently  Intervention: Prevent and Manage VTE (Venous Thromboembolism) Risk  Recent Flowsheet Documentation  Taken 9/30/2022 2123 by Alondra Hyde RN  Activity Management: up in chair  VTE Prevention/Management: (lovenox) other (see comments)  Range of Motion: active ROM (range of motion) encouraged  Goal: Optimal Comfort and Wellbeing  Outcome: Ongoing, Progressing  Intervention: Provide Person-Centered Care  Recent Flowsheet Documentation  Taken 9/30/2022 2123 by Alondra Hyde RN  Trust Relationship/Rapport:   care explained   choices provided   emotional support provided   empathic listening provided   questions answered   questions encouraged   reassurance provided   thoughts/feelings acknowledged  Goal: Readiness for Transition of Care  Outcome: Ongoing, Progressing     Problem: Skin Injury Risk Increased  Goal: Skin Health and Integrity  Outcome: Ongoing, Progressing     Problem: Pain Acute  Goal: Acceptable Pain Control and Functional Ability  Outcome: Ongoing, Progressing  Intervention: Optimize Psychosocial Wellbeing  Recent Flowsheet Documentation  Taken 9/30/2022 2123 by Alondra Hyde RN  Diversional  Activities:   smartphone   television     Problem: Diabetes Comorbidity  Goal: Blood Glucose Level Within Targeted Range  Outcome: Ongoing, Progressing     Problem: Heart Failure Comorbidity  Goal: Maintenance of Heart Failure Symptom Control  Outcome: Ongoing, Progressing     Problem: Fall Injury Risk  Goal: Absence of Fall and Fall-Related Injury  Outcome: Ongoing, Progressing  Intervention: Promote Injury-Free Environment  Recent Flowsheet Documentation  Taken 10/1/2022 0030 by Alondra Hyde, RN  Safety Promotion/Fall Prevention: safety round/check completed  Taken 9/30/2022 2123 by Alondra Hyde RN  Safety Promotion/Fall Prevention: safety round/check completed  Taken 9/30/2022 2010 by Alondra Hyde, RN  Safety Promotion/Fall Prevention: safety round/check completed     Problem: Anxiety  Goal: Anxiety Reduction or Resolution  Outcome: Ongoing, Progressing  Intervention: Promote Anxiety Reduction  Recent Flowsheet Documentation  Taken 9/30/2022 2123 by Alondra Hyde, RN  Family/Support System Care:   support provided   self-care encouraged   Goal Outcome Evaluation:

## 2022-10-02 ENCOUNTER — READMISSION MANAGEMENT (OUTPATIENT)
Dept: CALL CENTER | Facility: HOSPITAL | Age: 62
End: 2022-10-02

## 2022-10-02 VITALS
HEIGHT: 55 IN | OXYGEN SATURATION: 94 % | DIASTOLIC BLOOD PRESSURE: 76 MMHG | WEIGHT: 169.53 LBS | TEMPERATURE: 97.8 F | RESPIRATION RATE: 16 BRPM | BODY MASS INDEX: 39.23 KG/M2 | SYSTOLIC BLOOD PRESSURE: 152 MMHG | HEART RATE: 90 BPM

## 2022-10-02 LAB
ALBUMIN SERPL-MCNC: 2.6 G/DL (ref 3.5–5.2)
ALBUMIN/GLOB SERPL: 1.1 G/DL
ALP SERPL-CCNC: 71 U/L (ref 39–117)
ALT SERPL W P-5'-P-CCNC: 25 U/L (ref 1–33)
ANION GAP SERPL CALCULATED.3IONS-SCNC: 9 MMOL/L (ref 5–15)
AST SERPL-CCNC: 52 U/L (ref 1–32)
BASOPHILS # BLD AUTO: 0.01 10*3/MM3 (ref 0–0.2)
BASOPHILS NFR BLD AUTO: 0.2 % (ref 0–1.5)
BILIRUB SERPL-MCNC: 0.2 MG/DL (ref 0–1.2)
BUN SERPL-MCNC: 20 MG/DL (ref 8–23)
BUN/CREAT SERPL: 25.6 (ref 7–25)
CALCIUM SPEC-SCNC: 8.4 MG/DL (ref 8.6–10.5)
CHLORIDE SERPL-SCNC: 104 MMOL/L (ref 98–107)
CO2 SERPL-SCNC: 27 MMOL/L (ref 22–29)
CREAT SERPL-MCNC: 0.78 MG/DL (ref 0.57–1)
DEPRECATED RDW RBC AUTO: 45.5 FL (ref 37–54)
EGFRCR SERPLBLD CKD-EPI 2021: 86.5 ML/MIN/1.73
EOSINOPHIL # BLD AUTO: 0.23 10*3/MM3 (ref 0–0.4)
EOSINOPHIL NFR BLD AUTO: 4 % (ref 0.3–6.2)
ERYTHROCYTE [DISTWIDTH] IN BLOOD BY AUTOMATED COUNT: 13.7 % (ref 12.3–15.4)
GLOBULIN UR ELPH-MCNC: 2.4 GM/DL
GLUCOSE BLDC GLUCOMTR-MCNC: 123 MG/DL (ref 70–130)
GLUCOSE SERPL-MCNC: 196 MG/DL (ref 65–99)
HCT VFR BLD AUTO: 36.6 % (ref 34–46.6)
HGB BLD-MCNC: 11.6 G/DL (ref 12–15.9)
IMM GRANULOCYTES # BLD AUTO: 0.01 10*3/MM3 (ref 0–0.05)
IMM GRANULOCYTES NFR BLD AUTO: 0.2 % (ref 0–0.5)
LYMPHOCYTES # BLD AUTO: 1.22 10*3/MM3 (ref 0.7–3.1)
LYMPHOCYTES NFR BLD AUTO: 21.4 % (ref 19.6–45.3)
MCH RBC QN AUTO: 28.5 PG (ref 26.6–33)
MCHC RBC AUTO-ENTMCNC: 31.7 G/DL (ref 31.5–35.7)
MCV RBC AUTO: 89.9 FL (ref 79–97)
MONOCYTES # BLD AUTO: 0.11 10*3/MM3 (ref 0.1–0.9)
MONOCYTES NFR BLD AUTO: 1.9 % (ref 5–12)
NEUTROPHILS NFR BLD AUTO: 4.12 10*3/MM3 (ref 1.7–7)
NEUTROPHILS NFR BLD AUTO: 72.3 % (ref 42.7–76)
NRBC BLD AUTO-RTO: 0 /100 WBC (ref 0–0.2)
PLATELET # BLD AUTO: 289 10*3/MM3 (ref 140–450)
PMV BLD AUTO: 10.9 FL (ref 6–12)
POTASSIUM SERPL-SCNC: 4.5 MMOL/L (ref 3.5–5.2)
PROT SERPL-MCNC: 5 G/DL (ref 6–8.5)
RBC # BLD AUTO: 4.07 10*6/MM3 (ref 3.77–5.28)
SODIUM SERPL-SCNC: 140 MMOL/L (ref 136–145)
WBC NRBC COR # BLD: 5.7 10*3/MM3 (ref 3.4–10.8)

## 2022-10-02 PROCEDURE — 25010000002 ENOXAPARIN PER 10 MG: Performed by: SURGERY

## 2022-10-02 PROCEDURE — 85025 COMPLETE CBC W/AUTO DIFF WBC: CPT | Performed by: INTERNAL MEDICINE

## 2022-10-02 PROCEDURE — 25010000002 HEPARIN LOCK FLUSH PER 10 UNITS: Performed by: INTERNAL MEDICINE

## 2022-10-02 PROCEDURE — 82962 GLUCOSE BLOOD TEST: CPT

## 2022-10-02 PROCEDURE — 63710000001 INSULIN LISPRO (HUMAN) PER 5 UNITS: Performed by: SURGERY

## 2022-10-02 PROCEDURE — 80053 COMPREHEN METABOLIC PANEL: CPT | Performed by: INTERNAL MEDICINE

## 2022-10-02 RX ORDER — MELATONIN
1000 DAILY
Status: DISCONTINUED | OUTPATIENT
Start: 2022-10-02 | End: 2022-10-02 | Stop reason: HOSPADM

## 2022-10-02 RX ADMIN — INSULIN LISPRO 4 UNITS: 100 INJECTION, SOLUTION INTRAVENOUS; SUBCUTANEOUS at 08:42

## 2022-10-02 RX ADMIN — ATORVASTATIN CALCIUM 20 MG: 20 TABLET, FILM COATED ORAL at 08:42

## 2022-10-02 RX ADMIN — Medication 10 ML: at 08:45

## 2022-10-02 RX ADMIN — PANTOPRAZOLE SODIUM 40 MG: 40 TABLET, DELAYED RELEASE ORAL at 08:42

## 2022-10-02 RX ADMIN — HEPARIN 500 UNITS: 100 SYRINGE at 15:30

## 2022-10-02 RX ADMIN — INSULIN GLARGINE-YFGN 20 UNITS: 100 INJECTION, SOLUTION SUBCUTANEOUS at 08:41

## 2022-10-02 RX ADMIN — ENOXAPARIN SODIUM 40 MG: 100 INJECTION SUBCUTANEOUS at 15:30

## 2022-10-02 RX ADMIN — Medication 10 ML: at 08:46

## 2022-10-02 RX ADMIN — ASPIRIN 81 MG: 81 TABLET, CHEWABLE ORAL at 08:45

## 2022-10-02 RX ADMIN — ACETAMINOPHEN 650 MG: 325 TABLET, FILM COATED ORAL at 05:56

## 2022-10-02 RX ADMIN — INSULIN LISPRO 2 UNITS: 100 INJECTION, SOLUTION INTRAVENOUS; SUBCUTANEOUS at 08:42

## 2022-10-02 NOTE — OUTREACH NOTE
Prep Survey    Flowsheet Row Responses   [facility] facility patient discharged from? [facility]   Is LACE score < 7 ? No   Emergency Room discharge w/ pulse ox? No   Eligibility Readm Mgmt   Discharge diagnosis Ascites,  covid,  INSERTION VENOUS ACCESS DEVICE   Does the patient have one of the following disease processes/diagnoses(primary or secondary)? Other   Does the patient have Home health ordered? No   Is there a DME ordered? No   Prep survey completed? Yes          ROYCE ALFARO - Registered Nurse

## 2022-10-02 NOTE — DISCHARGE SUMMARY
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     Procedure(s):  INSERTION VENOUS ACCESS DEVICE     Consults     Date and Time Order Name Status Description    9/30/2022  5:19 PM Inpatient Nephrology Consult      9/20/2022 12:25 PM Inpatient Gynecologic Oncology Consult Completed     9/20/2022 12:24 PM Inpatient General Surgery Consult Completed         Hospital Course  62 y.o. female  was admitted with new onset of ascites which appeared to be due to malignant source.  Tumor markers revealed elevated  of over 6000.  She also has porcelain gallbladder.  GYN oncology was following and awaiting final tissue analysis.  Paracentesis fluid was nonconclusive but flow cytometry is pending.  Patient had continued to improve during the hospital stay, she was cleared by gynecologic oncology for discharge with appropriate follow-up on outpatient basis.  I have seen and examined patient at bedside, total time spent on discharge and management is more than 30 minutes.  Plan of care was discussed with her and she verbalized understanding.      Condition on Discharge: Improved.     Temp:  [97.1 °F (36.2 °C)-97.4 °F (36.3 °C)] 97.4 °F (36.3 °C)  Heart Rate:  [68-91] 74  Resp:  [16] 16  BP: (113-150)/(55-74) 117/55  Body mass index is 40.85 kg/m².    Physical Exam    Constitutional:       Appearance:  Normal appearance.   HENT:      Head: Normocephalic and atraumatic.   Eyes:      Extraocular Movements: Extraocular movements intact.      Pupils: Pupils are equal, round, and reactive to light.   Cardiovascular:      Rate and Rhythm: Normal rate and regular rhythm.   Pulmonary:      Effort: Pulmonary effort is normal. No respiratory distress.   Abdominal:      General: There is no distension.      Palpations: Abdomen is soft.      Tenderness: There is no abdominal tenderness.   Musculoskeletal:      Comments: Right BKA, Left AKA   Skin:     Comments: Port on right chest wall    Neurological:      General: No focal deficit present.      Mental Status: She is alert and oriented to person, place, and time.     Disposition: Home or Self Care       Discharge Medications      New Medications      Instructions Start Date   OLANZapine 5 MG tablet  Commonly known as: ZyPREXA   5 mg, Oral, Nightly, Take on days 2, 3 and 4 after chemotherapy.         Changes to Medications      Instructions Start Date   ondansetron 8 MG tablet  Commonly known as: ZOFRAN  What changed: You were already taking a medication with the same name, and this prescription was added. Make sure you understand how and when to take each.   8 mg, Oral, 3 Times Daily PRN      ondansetron 4 MG tablet  Commonly known as: ZOFRAN  What changed: Another medication with the same name was added. Make sure you understand how and when to take each.   4 mg, Oral, Every 8 Hours PRN         Continue These Medications      Instructions Start Date   acetaminophen 325 MG tablet  Commonly known as: TYLENOL   650 mg, Oral, Every 6 Hours PRN      aspirin 81 MG chewable tablet   81 mg, Oral, Daily      atorvastatin 20 MG tablet  Commonly known as: LIPITOR   20 mg, Oral, Daily      carvedilol 3.125 MG tablet  Commonly known as: COREG   3.125 mg, Oral, 2 Times Daily With Meals      docusate sodium 100 MG capsule  Commonly known as: COLACE   100 mg, Oral, 2 Times Daily       gabapentin 400 MG capsule  Commonly known as: NEURONTIN   400 mg, Oral, 3 Times Daily      insulin aspart 100 UNIT/ML injection  Commonly known as: novoLOG   4 Units, Subcutaneous, 3 Times Daily Before Meals      LANTUS SC   20 Units, Subcutaneous, 2 Times Daily      multivitamin with minerals tablet tablet   1 tablet, Oral, Daily      O2  Commonly known as: OXYGEN   2 L/min, Inhalation, Once      oxyCODONE-acetaminophen 5-325 MG per tablet  Commonly known as: PERCOCET   1 tablet, Oral, Every 6 Hours PRN      pantoprazole 40 MG EC tablet  Commonly known as: PROTONIX   40 mg, Oral, Daily      polyethylene glycol packet  Commonly known as: MIRALAX   17 g, Oral, Daily      saccharomyces boulardii 250 MG capsule  Commonly known as: FLORASTOR   250 mg, Oral, 2 Times Daily      senna 8.6 MG tablet  Commonly known as: SENOKOT   Oral, Nightly         Stop These Medications    apixaban 5 MG tablet tablet  Commonly known as: ELIQUIS     escitalopram 5 MG tablet  Commonly known as: LEXAPRO     furosemide 40 MG tablet  Commonly known as: LASIX     lisinopril 2.5 MG tablet  Commonly known as: PRINIVIL,ZESTRIL     metOLazone 2.5 MG tablet  Commonly known as: ZAROXOLYN             Additional Instructions for the Follow-ups that You Need to Schedule        Sep 29, 2022      Release to patient: Routine Release         CBC and Differential   Sep 29, 2022      Manual Differential: No    Release to patient: Routine Release         Comprehensive metabolic panel   Sep 29, 2022      Release to patient: Routine Release         Discharge Follow-up with PCP   As directed       Currently Documented PCP:    Jesi Spence    PCP Phone Number:    [phone]     Follow Up Details: Follow-up with PCP upon discharge,  In 7 days.            Follow-up Information     Jesi Spence .    Specialty: Nurse Practitioner  Why: Follow-up with PCP upon discharge,  In 7 days.  Contact information:  [address] [place]  [id number]  [phone]                        Pending Labs     Order Current Status    Non-gynecologic Cytology Collected (09/20/22 0828)    Fungus Culture - Body Fluid, Peritoneum Preliminary result         Dion Murphy MD  [facility]  10/02/22    Discharge time spent greater than 30 minutes.

## 2022-10-02 NOTE — PROGRESS NOTES
" LOS: 13 days   Patient Care Team:  Jesi Spence as PCP - General (Nurse Practitioner)    Chief Complaint: Hypocalcemia    Subjective     History of Present Illness  Pt doing well without any new complaints  Tells me she is going home.    Subjective:  Symptoms:  No shortness of breath, chest pain, chest pressure or anxiety.    Diet:  No nausea or vomiting.        History taken from: patient    Objective     Vital Sign Min/Max for last 24 hours  Temp  Min: 97.1 °F (36.2 °C)  Max: 97.8 °F (36.6 °C)   BP  Min: 113/64  Max: 152/76   Pulse  Min: 74  Max: 91   Resp  Min: 16  Max: 16   SpO2  Min: 92 %  Max: 94 %   No data recorded   No data recorded     Flowsheet Rows    Flowsheet Row First Filed Value   Admission Height 137.2 cm (54.02\") Documented at 09/28/2022 1328   Admission Weight 82.2 kg (181 lb 3.5 oz) Documented at 09/19/2022 1751          No intake/output data recorded.  I/O last 3 completed shifts:  In: 240 [P.O.:240]  Out: -     Objective:  General Appearance:  Comfortable.    Vital signs: (most recent): Blood pressure 152/76, pulse 90, temperature 97.8 °F (36.6 °C), temperature source Oral, resp. rate 16, height 137.2 cm (54.02\"), weight 76.9 kg (169 lb 8.5 oz), SpO2 94 %.  Vital signs are normal.    Output: Producing urine.    HEENT: Normal HEENT exam.    Lungs:  Normal effort and normal respiratory rate.    Heart: Normal rate.  Regular rhythm.    Abdomen: Abdomen is soft.  Bowel sounds are normal.   There is no abdominal tenderness.     Extremities: Normal range of motion.  (RLE BKA  LLE AKA)            Results Review:     I reviewed the patient's new clinical results.    WBC WBC   Date Value Ref Range Status   10/02/2022 5.70 3.40 - 10.80 10*3/mm3 Final   10/01/2022 5.36 3.40 - 10.80 10*3/mm3 Final   09/30/2022 8.27 3.40 - 10.80 10*3/mm3 Final      HGB Hemoglobin   Date Value Ref Range Status   10/02/2022 11.6 (L) 12.0 - 15.9 g/dL Final   10/01/2022 11.5 (L) 12.0 - 15.9 g/dL Final   09/30/2022 11.2 (L) " 12.0 - 15.9 g/dL Final      HCT Hematocrit   Date Value Ref Range Status   10/02/2022 36.6 34.0 - 46.6 % Final   10/01/2022 36.4 34.0 - 46.6 % Final   09/30/2022 35.0 34.0 - 46.6 % Final      Platlets No results found for: LABPLAT   MCV MCV   Date Value Ref Range Status   10/02/2022 89.9 79.0 - 97.0 fL Final   10/01/2022 89.7 79.0 - 97.0 fL Final   09/30/2022 87.9 79.0 - 97.0 fL Final          Sodium Sodium   Date Value Ref Range Status   10/02/2022 140 136 - 145 mmol/L Final   10/01/2022 138 136 - 145 mmol/L Final   09/30/2022 140 136 - 145 mmol/L Final      Potassium Potassium   Date Value Ref Range Status   10/02/2022 4.5 3.5 - 5.2 mmol/L Final   10/01/2022 4.3 3.5 - 5.2 mmol/L Final   09/30/2022 4.4 3.5 - 5.2 mmol/L Final      Chloride Chloride   Date Value Ref Range Status   10/02/2022 104 98 - 107 mmol/L Final   10/01/2022 102 98 - 107 mmol/L Final   09/30/2022 104 98 - 107 mmol/L Final      CO2 CO2   Date Value Ref Range Status   10/02/2022 27.0 22.0 - 29.0 mmol/L Final   10/01/2022 27.2 22.0 - 29.0 mmol/L Final   09/30/2022 29.3 (H) 22.0 - 29.0 mmol/L Final      BUN BUN   Date Value Ref Range Status   10/02/2022 20 8 - 23 mg/dL Final   10/01/2022 23 8 - 23 mg/dL Final   09/30/2022 21 8 - 23 mg/dL Final      Creatinine Creatinine   Date Value Ref Range Status   10/02/2022 0.78 0.57 - 1.00 mg/dL Final   10/01/2022 0.85 0.57 - 1.00 mg/dL Final   09/30/2022 0.81 0.57 - 1.00 mg/dL Final      Calcium Calcium   Date Value Ref Range Status   10/02/2022 8.4 (L) 8.6 - 10.5 mg/dL Final   10/01/2022 8.3 (L) 8.6 - 10.5 mg/dL Final   09/30/2022 7.9 (L) 8.6 - 10.5 mg/dL Final      PO4 No results found for: CAPO4   Albumin Albumin   Date Value Ref Range Status   10/02/2022 2.60 (L) 3.50 - 5.20 g/dL Final   10/01/2022 2.70 (L) 3.50 - 5.20 g/dL Final   09/30/2022 2.30 (L) 3.50 - 5.20 g/dL Final      Magnesium No results found for: MG   Uric Acid No results found for: URICACID     Medication Review:   aspirin, 81 mg, Oral,  Daily  atorvastatin, 20 mg, Oral, Daily  enoxaparin, 40 mg, Subcutaneous, Q24H  insulin glargine, 20 Units, Subcutaneous, Q12H  insulin lispro, 0-7 Units, Subcutaneous, TID AC  insulin lispro, 4 Units, Subcutaneous, TID With Meals  pantoprazole, 40 mg, Oral, Daily  polyethylene glycol, 17 g, Oral, Daily  senna, 2 tablet, Oral, Nightly  sodium chloride, 10 mL, Intravenous, Q12H  sodium chloride, 10 mL, Intravenous, Q12H          Assessment & Plan       Ascites    Porcelain gallbladder    Diabetes mellitus (HCC)    Hyperlipidemia    Coronary artery disease    Hypertension    Peripheral vascular disease, secondary (HCC)    Elevated cancer antigen 125 (CA-125)    Fallopian tube carcinoma, unspecified laterality (HCC)      Assessment & Plan  Hypocalcemia-  Improved today 7.9->8.3->8.4.  vit d was low at 17.4ng/ml.  Will add oral replacement.  D/w her to start 1,000iu/day since she is going home today.  Corrects up with low albumin also.    High-grade mullerian carcinoma-  Chemo started.  DMII- on insulin and ssi now.  CAD  PVD-  S/p amputation related to DM.    Valentin Barr MD  10/02/22  15:40 EDT

## 2022-10-02 NOTE — PLAN OF CARE
Goal Outcome Evaluation:           Progress: improving  Outcome Evaluation: Patient alert and oriented, on room air, , blood sugars treated PRN see MAR, tolerating regular diet, port site CDI good blood return planning discharge will de-access today, vitals stable

## 2022-10-04 ENCOUNTER — READMISSION MANAGEMENT (OUTPATIENT)
Dept: CALL CENTER | Facility: HOSPITAL | Age: 62
End: 2022-10-04

## 2022-10-04 NOTE — CASE MANAGEMENT/SOCIAL WORK
Case Management Discharge Note      Final Note: Home with son    Provided Post Acute Provider List?: N/A  N/A Provider List Comment: Denies needs. Plan  is home    Selected Continued Care - Discharged on 10/2/2022 Admission date: 9/19/2022 - Discharge disposition: Home or Self Care    Destination    No services have been selected for the patient.              Durable Medical Equipment    No services have been selected for the patient.              Dialysis/Infusion    No services have been selected for the patient.              Home Medical Care    No services have been selected for the patient.              Therapy    No services have been selected for the patient.              Community Resources    No services have been selected for the patient.              Community & DME    No services have been selected for the patient.                       Final Discharge Disposition Code: 01 - home or self-care

## 2022-10-04 NOTE — OUTREACH NOTE
Medical Week 1 Survey    Flowsheet Row Responses   [facility] patient discharged from? [place]   Does the patient have one of the following disease processes/diagnoses(primary or secondary)? Other   Week 1 attempt successful? No   Unsuccessful attempts Attempt 1          BIANAC Fraga Registered Nurse

## 2022-10-12 ENCOUNTER — READMISSION MANAGEMENT (OUTPATIENT)
Dept: CALL CENTER | Facility: HOSPITAL | Age: 62
End: 2022-10-12

## 2022-10-12 NOTE — OUTREACH NOTE
Medical Week 1 Survey    Flowsheet Row Responses   [facility] patient discharged from? [place]   Does the patient have one of the following disease processes/diagnoses(primary or secondary)? Other   Week 1 attempt successful? No   Unsuccessful attempts Attempt 2          BIANCA Fraga Registered Nurse

## 2022-10-16 ENCOUNTER — READMISSION MANAGEMENT (OUTPATIENT)
Dept: CALL CENTER | Facility: HOSPITAL | Age: 62
End: 2022-10-16

## 2022-10-16 NOTE — OUTREACH NOTE
Medical Week 3 Survey    Flowsheet Row Responses   [facility] patient discharged from? [place]   Does the patient have one of the following disease processes/diagnoses(primary or secondary)? Other   Week 3 attempt successful? No   Unsuccessful attempts Attempt 1   Revoke Readmitted          BRYCE ARRIETA - Registered Nurse

## 2022-10-17 ENCOUNTER — TELEPHONE (OUTPATIENT)
Dept: GYNECOLOGIC ONCOLOGY | Facility: CLINIC | Age: 62
End: 2022-10-17

## 2022-10-18 LAB — FUNGUS WND CULT: NORMAL

## 2022-10-19 ENCOUNTER — INFUSION (OUTPATIENT)
Dept: ONCOLOGY | Facility: HOSPITAL | Age: 62
End: 2022-10-19

## 2022-10-19 ENCOUNTER — OFFICE VISIT (OUTPATIENT)
Dept: GYNECOLOGIC ONCOLOGY | Facility: CLINIC | Age: 62
End: 2022-10-19

## 2022-10-19 VITALS — DIASTOLIC BLOOD PRESSURE: 79 MMHG | HEART RATE: 93 BPM | SYSTOLIC BLOOD PRESSURE: 131 MMHG

## 2022-10-19 VITALS
BODY MASS INDEX: 39.11 KG/M2 | TEMPERATURE: 96.7 F | WEIGHT: 169 LBS | HEART RATE: 100 BPM | RESPIRATION RATE: 20 BRPM | HEIGHT: 55 IN | SYSTOLIC BLOOD PRESSURE: 178 MMHG | DIASTOLIC BLOOD PRESSURE: 83 MMHG | OXYGEN SATURATION: 96 %

## 2022-10-19 DIAGNOSIS — C57.00 FALLOPIAN TUBE CARCINOMA, UNSPECIFIED LATERALITY: Primary | ICD-10-CM

## 2022-10-19 DIAGNOSIS — Z45.9 ENCOUNTER FOR MANAGEMENT OF IMPLANTED DEVICE: ICD-10-CM

## 2022-10-19 LAB
ALBUMIN SERPL-MCNC: 3.6 G/DL (ref 3.5–5.2)
ALBUMIN/GLOB SERPL: 1 G/DL (ref 1.1–2.4)
ALP SERPL-CCNC: 119 U/L (ref 38–116)
ALT SERPL W P-5'-P-CCNC: 17 U/L (ref 0–33)
ANION GAP SERPL CALCULATED.3IONS-SCNC: 12.6 MMOL/L (ref 5–15)
AST SERPL-CCNC: 22 U/L (ref 0–32)
BASOPHILS # BLD AUTO: 0.03 10*3/MM3 (ref 0–0.2)
BASOPHILS NFR BLD AUTO: 0.6 % (ref 0–1.5)
BILIRUB SERPL-MCNC: 0.2 MG/DL (ref 0.2–1.2)
BUN SERPL-MCNC: 12 MG/DL (ref 6–20)
BUN/CREAT SERPL: 17.6 (ref 7.3–30)
CALCIUM SPEC-SCNC: 9.7 MG/DL (ref 8.5–10.2)
CANCER AG125 SERPL QL: 2059 U/ML (ref 0–38.1)
CHLORIDE SERPL-SCNC: 99 MMOL/L (ref 98–107)
CO2 SERPL-SCNC: 25.4 MMOL/L (ref 22–29)
CREAT SERPL-MCNC: 0.68 MG/DL (ref 0.6–1.1)
DEPRECATED RDW RBC AUTO: 48.9 FL (ref 37–54)
EGFRCR SERPLBLD CKD-EPI 2021: 99.2 ML/MIN/1.73
EOSINOPHIL # BLD AUTO: 0.08 10*3/MM3 (ref 0–0.4)
EOSINOPHIL NFR BLD AUTO: 1.5 % (ref 0.3–6.2)
ERYTHROCYTE [DISTWIDTH] IN BLOOD BY AUTOMATED COUNT: 15.7 % (ref 12.3–15.4)
GLOBULIN UR ELPH-MCNC: 3.6 GM/DL (ref 1.8–3.5)
GLUCOSE SERPL-MCNC: 402 MG/DL (ref 74–124)
HCT VFR BLD AUTO: 41.2 % (ref 34–46.6)
HGB BLD-MCNC: 12.4 G/DL (ref 12–15.9)
IMM GRANULOCYTES # BLD AUTO: 0.03 10*3/MM3 (ref 0–0.05)
IMM GRANULOCYTES NFR BLD AUTO: 0.6 % (ref 0–0.5)
LYMPHOCYTES # BLD AUTO: 1.46 10*3/MM3 (ref 0.7–3.1)
LYMPHOCYTES NFR BLD AUTO: 28 % (ref 19.6–45.3)
MCH RBC QN AUTO: 27.6 PG (ref 26.6–33)
MCHC RBC AUTO-ENTMCNC: 30.1 G/DL (ref 31.5–35.7)
MCV RBC AUTO: 91.8 FL (ref 79–97)
MONOCYTES # BLD AUTO: 0.5 10*3/MM3 (ref 0.1–0.9)
MONOCYTES NFR BLD AUTO: 9.6 % (ref 5–12)
NEUTROPHILS NFR BLD AUTO: 3.12 10*3/MM3 (ref 1.7–7)
NEUTROPHILS NFR BLD AUTO: 59.7 % (ref 42.7–76)
NRBC BLD AUTO-RTO: 0 /100 WBC (ref 0–0.2)
PLATELET # BLD AUTO: 231 10*3/MM3 (ref 140–450)
PMV BLD AUTO: 10.7 FL (ref 6–12)
POTASSIUM SERPL-SCNC: 4.1 MMOL/L (ref 3.5–4.7)
PROT SERPL-MCNC: 7.2 G/DL (ref 6.3–8)
RBC # BLD AUTO: 4.49 10*6/MM3 (ref 3.77–5.28)
SODIUM SERPL-SCNC: 137 MMOL/L (ref 134–145)
WBC NRBC COR # BLD: 5.22 10*3/MM3 (ref 3.4–10.8)

## 2022-10-19 PROCEDURE — 25010000002 CARBOPLATIN PER 50 MG: Performed by: OBSTETRICS & GYNECOLOGY

## 2022-10-19 PROCEDURE — 96415 CHEMO IV INFUSION ADDL HR: CPT

## 2022-10-19 PROCEDURE — 25010000002 PALONOSETRON PER 25 MCG: Performed by: OBSTETRICS & GYNECOLOGY

## 2022-10-19 PROCEDURE — 86304 IMMUNOASSAY TUMOR CA 125: CPT | Performed by: OBSTETRICS & GYNECOLOGY

## 2022-10-19 PROCEDURE — 96417 CHEMO IV INFUS EACH ADDL SEQ: CPT

## 2022-10-19 PROCEDURE — 99215 OFFICE O/P EST HI 40 MIN: CPT | Performed by: OBSTETRICS & GYNECOLOGY

## 2022-10-19 PROCEDURE — 96367 TX/PROPH/DG ADDL SEQ IV INF: CPT

## 2022-10-19 PROCEDURE — 85025 COMPLETE CBC W/AUTO DIFF WBC: CPT

## 2022-10-19 PROCEDURE — 25010000002 PACLITAXEL PER 1 MG: Performed by: OBSTETRICS & GYNECOLOGY

## 2022-10-19 PROCEDURE — 25010000002 DEXAMETHASONE SODIUM PHOSPHATE 100 MG/10ML SOLUTION 10 ML VIAL: Performed by: OBSTETRICS & GYNECOLOGY

## 2022-10-19 PROCEDURE — 25010000002 DIPHENHYDRAMINE PER 50 MG: Performed by: OBSTETRICS & GYNECOLOGY

## 2022-10-19 PROCEDURE — 80053 COMPREHEN METABOLIC PANEL: CPT

## 2022-10-19 PROCEDURE — 25010000002 HEPARIN LOCK FLUSH PER 10 UNITS: Performed by: OBSTETRICS & GYNECOLOGY

## 2022-10-19 PROCEDURE — 96375 TX/PRO/DX INJ NEW DRUG ADDON: CPT

## 2022-10-19 PROCEDURE — 96413 CHEMO IV INFUSION 1 HR: CPT

## 2022-10-19 PROCEDURE — 25010000002 FOSAPREPITANT PER 1 MG: Performed by: OBSTETRICS & GYNECOLOGY

## 2022-10-19 RX ORDER — OLANZAPINE 5 MG/1
5 TABLET ORAL ONCE
Status: COMPLETED | OUTPATIENT
Start: 2022-10-19 | End: 2022-10-19

## 2022-10-19 RX ORDER — HEPARIN SODIUM (PORCINE) LOCK FLUSH IV SOLN 100 UNIT/ML 100 UNIT/ML
500 SOLUTION INTRAVENOUS AS NEEDED
Status: CANCELLED | OUTPATIENT
Start: 2022-10-19

## 2022-10-19 RX ORDER — SODIUM CHLORIDE 9 MG/ML
250 INJECTION, SOLUTION INTRAVENOUS ONCE
Status: COMPLETED | OUTPATIENT
Start: 2022-10-19 | End: 2022-10-19

## 2022-10-19 RX ORDER — SODIUM CHLORIDE 0.9 % (FLUSH) 0.9 %
10 SYRINGE (ML) INJECTION AS NEEDED
Status: CANCELLED | OUTPATIENT
Start: 2022-10-19

## 2022-10-19 RX ORDER — FAMOTIDINE 10 MG/ML
20 INJECTION, SOLUTION INTRAVENOUS AS NEEDED
Status: CANCELLED | OUTPATIENT
Start: 2022-10-19

## 2022-10-19 RX ORDER — DIPHENHYDRAMINE HYDROCHLORIDE 50 MG/ML
50 INJECTION INTRAMUSCULAR; INTRAVENOUS AS NEEDED
Status: CANCELLED | OUTPATIENT
Start: 2022-10-19

## 2022-10-19 RX ORDER — FAMOTIDINE 10 MG/ML
20 INJECTION, SOLUTION INTRAVENOUS ONCE
Status: COMPLETED | OUTPATIENT
Start: 2022-10-19 | End: 2022-10-19

## 2022-10-19 RX ORDER — SODIUM CHLORIDE 0.9 % (FLUSH) 0.9 %
10 SYRINGE (ML) INJECTION AS NEEDED
Status: DISCONTINUED | OUTPATIENT
Start: 2022-10-19 | End: 2022-10-19 | Stop reason: HOSPADM

## 2022-10-19 RX ORDER — HEPARIN SODIUM (PORCINE) LOCK FLUSH IV SOLN 100 UNIT/ML 100 UNIT/ML
500 SOLUTION INTRAVENOUS AS NEEDED
Status: DISCONTINUED | OUTPATIENT
Start: 2022-10-19 | End: 2022-10-19 | Stop reason: HOSPADM

## 2022-10-19 RX ORDER — PALONOSETRON 0.05 MG/ML
0.25 INJECTION, SOLUTION INTRAVENOUS ONCE
Status: COMPLETED | OUTPATIENT
Start: 2022-10-19 | End: 2022-10-19

## 2022-10-19 RX ADMIN — FAMOTIDINE 20 MG: 10 INJECTION INTRAVENOUS at 11:00

## 2022-10-19 RX ADMIN — DEXAMETHASONE SODIUM PHOSPHATE 20 MG: 10 INJECTION, SOLUTION INTRAMUSCULAR; INTRAVENOUS at 11:03

## 2022-10-19 RX ADMIN — SODIUM CHLORIDE 250 ML: 9 INJECTION, SOLUTION INTRAVENOUS at 10:58

## 2022-10-19 RX ADMIN — DIPHENHYDRAMINE HYDROCHLORIDE 50 MG: 50 INJECTION, SOLUTION INTRAMUSCULAR; INTRAVENOUS at 11:21

## 2022-10-19 RX ADMIN — PALONOSETRON 0.25 MG: 0.05 INJECTION, SOLUTION INTRAVENOUS at 10:59

## 2022-10-19 RX ADMIN — CARBOPLATIN 600 MG: 10 INJECTION INTRAVENOUS at 15:22

## 2022-10-19 RX ADMIN — Medication 10 ML: at 15:55

## 2022-10-19 RX ADMIN — SODIUM CHLORIDE 100 ML: 9 INJECTION, SOLUTION INTRAVENOUS at 11:40

## 2022-10-19 RX ADMIN — Medication 500 UNITS: at 15:55

## 2022-10-19 RX ADMIN — PACLITAXEL 290 MG: 6 INJECTION, SOLUTION INTRAVENOUS at 12:17

## 2022-10-19 RX ADMIN — OLANZAPINE 5 MG: 5 TABLET, FILM COATED ORAL at 10:59

## 2022-10-19 NOTE — PROGRESS NOTES
Age: 62 y.o.  Sex: female  :  1960  MRN: [id number]       REFERRING PHYSICIAN: No ref. provider found     Joanne Contreras presents to [facility] for neoadjuvant chemo, cycle 2 carboplatin/taxol . She tolerated her first cycle well, berny labs did not change. She had fatigue and nausea, but noted this to subside after one week.  Her diabetes is labile (post R BKA, and left AKA) and today with her pre treatment steroids, glucose is over 400.       Oncology/Hematology History Overview Note   • 22: CT AP - Findings suspicious for peritoneal carcinomatosis with ascites and metastatic disease to both ovaries. Recommend diagnostic paracentesis for cytology. Also noted is a porcelain gallbladder and pancreatic atrophy.  • 22: Paracentesis - rare atypical cells suspicious but not diagnostic for carcinoma. Reactive mesothelium & lymphocytes.   • 22: Pelvic US - The bilateral ovaries appear slightly enlarged and heterogeneous but no dominant adnexal masses are seen.  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   • 22: Paracentesis - removed 3400 mL of dark katiana fluid   • 22-current : Carboplatin AUC 5, Taxol 175mg/m2 x 1C         10/19/22: C2 Carbo/Taxol  Hospital Follow Up      Fallopian tube carcinoma, unspecified laterality (HCC)   2022 Initial Diagnosis    Fallopian tube carcinoma, unspecified laterality (HCC)     2022 -  Chemotherapy    OP OVARIAN PACLitaxel / CARBOplatin (Q21D)         Past Medical History:  Past Medical History:   Diagnosis Date   • Anemia    • Coronary artery disease    • Diabetes mellitus (HCC)    • Hyperlipidemia    • Hypertension    • Peripheral vascular disease, secondary (HCC)    • Stroke (HCC)        Past Surgical History:  Past Surgical History:   Procedure Laterality Date   • ABOVE KNEE AMPUTATION Left    • BELOW KNEE AMPUTATION Right    • TOE SURGERY     • VENOUS  ACCESS DEVICE (PORT) INSERTION Right 9/28/2022    Procedure: INSERTION VENOUS ACCESS DEVICE;  Surgeon: Adama Barlow MD;  Location: [facility];  Service: General;  Laterality: Right;        MEDICATIONS:    Current Outpatient Medications:   •  acetaminophen (TYLENOL) 325 MG tablet, Take 650 mg by mouth Every 6 (Six) Hours As Needed for Mild Pain (1-3)., Disp: , Rfl:   •  aspirin 81 MG chewable tablet, Chew 81 mg Daily., Disp: , Rfl:   •  docusate sodium (COLACE) 100 MG capsule, Take 100 mg by mouth 2 (Two) Times a Day., Disp: , Rfl:   •  gabapentin (NEURONTIN) 400 MG capsule, Take 400 mg by mouth 3 (Three) Times a Day., Disp: , Rfl:   •  insulin aspart (novoLOG) 100 UNIT/ML injection, Inject 4 Units under the skin 3 (Three) Times a Day Before Meals., Disp: , Rfl:   •  Insulin Glargine (LANTUS SC), Inject 20 Units under the skin 2 (Two) Times a Day., Disp: , Rfl:   •  Multiple Vitamins-Minerals (MULTIVITAMIN WITH MINERALS) tablet tablet, Take 1 tablet by mouth Daily., Disp: , Rfl:   •  OLANZapine (ZyPREXA) 5 MG tablet, Take 1 tablet by mouth Every Night. Take on days 2, 3 and 4 after chemotherapy., Disp: 3 tablet, Rfl: 5  •  ondansetron (ZOFRAN) 4 MG tablet, Take 4 mg by mouth Every 8 (Eight) Hours As Needed for Nausea or Vomiting., Disp: , Rfl:   •  ondansetron (ZOFRAN) 8 MG tablet, Take 1 tablet by mouth 3 (Three) Times a Day As Needed for Nausea or Vomiting., Disp: 30 tablet, Rfl: 5  •  pantoprazole (PROTONIX) 40 MG EC tablet, Take 40 mg by mouth Daily., Disp: , Rfl:   •  saccharomyces boulardii (FLORASTOR) 250 MG capsule, Take 250 mg by mouth 2 (Two) Times a Day., Disp: , Rfl:   •  senna (SENOKOT) 8.6 MG tablet tablet, Take  by mouth Every Night., Disp: , Rfl:   •  atorvastatin (LIPITOR) 20 MG tablet, Take 20 mg by mouth Daily., Disp: , Rfl:   •  carvedilol (COREG) 3.125 MG tablet, Take 3.125 mg by mouth 2 (Two) Times a Day With Meals., Disp: , Rfl:   •  Continuous Blood Gluc Sensor (FreeStyle Dori 2  "Sensor) misc, , Disp: , Rfl:   •  O2 (OXYGEN), Inhale 2 L/min 1 (One) Time., Disp: , Rfl:   •  oxyCODONE-acetaminophen (PERCOCET) 5-325 MG per tablet, Take 1 tablet by mouth Every 6 (Six) Hours As Needed., Disp: , Rfl:   •  polyethylene glycol (MIRALAX) packet, Take 17 g by mouth Daily., Disp: , Rfl:     ALLERGIES:  Allergies   Allergen Reactions   • Beta Adrenergic Blockers Other (See Comments)     Make PAD pain / claudication worse         ROS:  CONSTITUTIONAL:  Denies fever or chills.   NEUROLOGIC:  Denies headache, focal weakness or sensory changes.   EYES:  Denies change in visual acuity.  HEENT:  Denies nasal congestion or sore throat.   RESPIRATORY:  Denies cough or shortness of breath.   CARDIOVASCULAR:  Denies chest pain or edema.   GI:  Denies abdominal pain, nausea, vomiting, bloody stools or diarrhea.   :  Denies dysuria, leaking or incontinence.  MUSCULOSKELETAL:  Denies back pain or joint pain.   INTEGUMENT:  Denies rash.   ENDOCRINE:  Denies polyuria or polydipsia.   LYMPHATIC:  Denies swollen glands or lymphedema.   PSYCHIATRIC:  Denies depression or anxiety.      PHYSICAL EXAM:  Vitals:    10/19/22 0948   BP: 178/83   Pulse: 100   Resp: 20   Temp: 96.7 °F (35.9 °C)   TempSrc: Temporal   SpO2: 96%   Weight: 76.7 kg (169 lb)   Height: 137.2 cm (54\")   PainSc: 0-No pain     Body mass index is 40.75 kg/m².  Current Status 10/19/2022   ECOG score 1     PHQ-9 Total Score:           Result Review :  The pertinent labs, images, and/or pathology as noted in the oncology history were reviewed independently and discussed with the patient.   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 Reviewed nausea mgmt.   • CT scan 3 weeks after cycle 2 for interval debulking planning.   • Patient needs strict glucose control - steroids will create greater glucose abberation. Pt states she does not have any insulin at this time but has been given Rx, spoke with Ms Spence, pt is highly non compliant with this. Will refer to endocrine. Above noted is recommended insulin regimen, pt is not taking.   • Friend for life referral - pt has very little social support. Will need to establish who can make medical decision as we go forward with her care.   - Will Cedeno (relative) [phone]  - Sherrie Rivero (relative) [phone]  - Roe Aditya (son) [phone]      CC: Jesi Spence [phone]            Jelly Montalvo D.O  10/19/2022    Gynecologic Oncology   [address] [place] [address]  [phone] office

## 2022-10-19 NOTE — NURSING NOTE
Pt's blood glucose is 403 today. She said she hasn't been monitoring it and hasn't been taking insulin. She said Dr Montalvo discussed this result with her this morning. Pt said she will be seeing an Endocrinologist that will be referred by Dr Montalvo. Pt made aware the Dexamethasone she will be given today will elevate her blood glucose and she said that was already discussed with her by Dr Montalvo.  Chemo consent signed for Taxol and Carbo that will be given today, but per message from Dr Montalvo, it is not necessary for her to resign it. She had her first dose in the hospital and the consent is probably scanned in from there.

## 2022-10-21 DIAGNOSIS — C57.00 FALLOPIAN TUBE CARCINOMA, UNSPECIFIED LATERALITY: Primary | ICD-10-CM

## 2022-10-26 ENCOUNTER — LAB (OUTPATIENT)
Dept: LAB | Facility: HOSPITAL | Age: 62
End: 2022-10-26

## 2022-10-26 ENCOUNTER — DOCUMENTATION (OUTPATIENT)
Dept: GYNECOLOGIC ONCOLOGY | Facility: CLINIC | Age: 62
End: 2022-10-26

## 2022-10-26 DIAGNOSIS — R79.0 LOW MAGNESIUM LEVEL: Primary | ICD-10-CM

## 2022-10-26 DIAGNOSIS — C57.00 FALLOPIAN TUBE CARCINOMA, UNSPECIFIED LATERALITY: ICD-10-CM

## 2022-10-26 DIAGNOSIS — K59.09 OTHER CONSTIPATION: Primary | ICD-10-CM

## 2022-10-26 LAB
ALBUMIN SERPL-MCNC: 4.2 G/DL (ref 3.5–5.2)
ALBUMIN/GLOB SERPL: 1.2 G/DL (ref 1.1–2.4)
ALP SERPL-CCNC: 117 U/L (ref 38–116)
ALT SERPL W P-5'-P-CCNC: 21 U/L (ref 0–33)
ANION GAP SERPL CALCULATED.3IONS-SCNC: 14.1 MMOL/L (ref 5–15)
AST SERPL-CCNC: 31 U/L (ref 0–32)
BASOPHILS # BLD AUTO: 0.01 10*3/MM3 (ref 0–0.2)
BASOPHILS NFR BLD AUTO: 0.5 % (ref 0–1.5)
BILIRUB SERPL-MCNC: 0.4 MG/DL (ref 0.2–1.2)
BUN SERPL-MCNC: 19 MG/DL (ref 6–20)
BUN/CREAT SERPL: 23.5 (ref 7.3–30)
CALCIUM SPEC-SCNC: 10.6 MG/DL (ref 8.5–10.2)
CANCER AG125 SERPL QL: 1182 U/ML (ref 0–38.1)
CHLORIDE SERPL-SCNC: 96 MMOL/L (ref 98–107)
CO2 SERPL-SCNC: 26.9 MMOL/L (ref 22–29)
CREAT SERPL-MCNC: 0.81 MG/DL (ref 0.6–1.1)
DEPRECATED RDW RBC AUTO: 49.8 FL (ref 37–54)
EGFRCR SERPLBLD CKD-EPI 2021: 82.7 ML/MIN/1.73
EOSINOPHIL # BLD AUTO: 0.04 10*3/MM3 (ref 0–0.4)
EOSINOPHIL NFR BLD AUTO: 1.9 % (ref 0.3–6.2)
ERYTHROCYTE [DISTWIDTH] IN BLOOD BY AUTOMATED COUNT: 15.9 % (ref 12.3–15.4)
GLOBULIN UR ELPH-MCNC: 3.6 GM/DL (ref 1.8–3.5)
GLUCOSE SERPL-MCNC: 349 MG/DL (ref 74–124)
HCT VFR BLD AUTO: 40.2 % (ref 34–46.6)
HGB BLD-MCNC: 12.7 G/DL (ref 12–15.9)
IMM GRANULOCYTES # BLD AUTO: 0.02 10*3/MM3 (ref 0–0.05)
IMM GRANULOCYTES NFR BLD AUTO: 0.9 % (ref 0–0.5)
LYMPHOCYTES # BLD AUTO: 0.88 10*3/MM3 (ref 0.7–3.1)
LYMPHOCYTES NFR BLD AUTO: 41.3 % (ref 19.6–45.3)
MAGNESIUM SERPL-MCNC: 1.1 MG/DL (ref 1.8–2.5)
MCH RBC QN AUTO: 28.3 PG (ref 26.6–33)
MCHC RBC AUTO-ENTMCNC: 31.6 G/DL (ref 31.5–35.7)
MCV RBC AUTO: 89.5 FL (ref 79–97)
MONOCYTES # BLD AUTO: 0.16 10*3/MM3 (ref 0.1–0.9)
MONOCYTES NFR BLD AUTO: 7.5 % (ref 5–12)
NEUTROPHILS NFR BLD AUTO: 1.02 10*3/MM3 (ref 1.7–7)
NEUTROPHILS NFR BLD AUTO: 47.9 % (ref 42.7–76)
NRBC BLD AUTO-RTO: 0 /100 WBC (ref 0–0.2)
PLATELET # BLD AUTO: 236 10*3/MM3 (ref 140–450)
PMV BLD AUTO: 11.1 FL (ref 6–12)
POTASSIUM SERPL-SCNC: 4.8 MMOL/L (ref 3.5–4.7)
PROT SERPL-MCNC: 7.8 G/DL (ref 6.3–8)
RBC # BLD AUTO: 4.49 10*6/MM3 (ref 3.77–5.28)
SODIUM SERPL-SCNC: 137 MMOL/L (ref 134–145)
WBC NRBC COR # BLD: 2.13 10*3/MM3 (ref 3.4–10.8)

## 2022-10-26 PROCEDURE — 36415 COLL VENOUS BLD VENIPUNCTURE: CPT

## 2022-10-26 PROCEDURE — 85025 COMPLETE CBC W/AUTO DIFF WBC: CPT

## 2022-10-26 PROCEDURE — 86304 IMMUNOASSAY TUMOR CA 125: CPT | Performed by: OBSTETRICS & GYNECOLOGY

## 2022-10-26 PROCEDURE — 80053 COMPREHEN METABOLIC PANEL: CPT

## 2022-10-26 PROCEDURE — 83735 ASSAY OF MAGNESIUM: CPT

## 2022-10-26 RX ORDER — MAGNESIUM OXIDE 400 MG/1
400 TABLET ORAL 2 TIMES DAILY
Qty: 60 TABLET | Refills: 2 | Status: SHIPPED | OUTPATIENT
Start: 2022-10-26 | End: 2023-01-24

## 2022-10-26 RX ORDER — AMOXICILLIN 250 MG
2 CAPSULE ORAL DAILY
Qty: 60 TABLET | Refills: 0 | Status: SHIPPED | OUTPATIENT
Start: 2022-10-26 | End: 2022-11-25

## 2022-10-26 NOTE — NURSING NOTE
Patient is here for lab review with RN.  CBC reviewed, educated on Neutropenic Precautions - education sheet provided, Patient has no complaints. Copy of labs given to patient and follow up appointment reviewed. Patient is instructed to call the office with any concerns or new symptoms prior to next visit. Patient verbalized understanding and discharged in stable condition.    CBC:      Lab 10/26/22  1004   WBC 2.13*   HEMOGLOBIN 12.7   HEMATOCRIT 40.2   PLATELETS 236   NEUTROS ABS 1.02*   IMMATURE GRANS (ABS) 0.02   LYMPHS ABS 0.88   MONOS ABS 0.16   EOS ABS 0.04   MCV 89.5

## 2022-11-08 DIAGNOSIS — C57.00 FALLOPIAN TUBE CARCINOMA, UNSPECIFIED LATERALITY: Primary | ICD-10-CM

## 2022-11-09 ENCOUNTER — INFUSION (OUTPATIENT)
Dept: ONCOLOGY | Facility: HOSPITAL | Age: 62
End: 2022-11-09

## 2022-11-09 ENCOUNTER — OFFICE VISIT (OUTPATIENT)
Dept: GYNECOLOGIC ONCOLOGY | Facility: CLINIC | Age: 62
End: 2022-11-09

## 2022-11-09 VITALS
WEIGHT: 169.9 LBS | HEART RATE: 85 BPM | TEMPERATURE: 96.5 F | OXYGEN SATURATION: 97 % | RESPIRATION RATE: 18 BRPM | BODY MASS INDEX: 39.32 KG/M2 | HEIGHT: 55 IN

## 2022-11-09 VITALS — SYSTOLIC BLOOD PRESSURE: 123 MMHG | HEART RATE: 84 BPM | DIASTOLIC BLOOD PRESSURE: 74 MMHG

## 2022-11-09 DIAGNOSIS — Z45.9 ENCOUNTER FOR MANAGEMENT OF IMPLANTED DEVICE: ICD-10-CM

## 2022-11-09 DIAGNOSIS — C57.00 FALLOPIAN TUBE CARCINOMA, UNSPECIFIED LATERALITY: Primary | ICD-10-CM

## 2022-11-09 DIAGNOSIS — C57.00 FALLOPIAN TUBE CARCINOMA, UNSPECIFIED LATERALITY: ICD-10-CM

## 2022-11-09 LAB
ALBUMIN SERPL-MCNC: 3.5 G/DL (ref 3.5–5.2)
ALBUMIN/GLOB SERPL: 1 G/DL (ref 1.1–2.4)
ALP SERPL-CCNC: 113 U/L (ref 38–116)
ALT SERPL W P-5'-P-CCNC: 20 U/L (ref 0–33)
ANION GAP SERPL CALCULATED.3IONS-SCNC: 10.1 MMOL/L (ref 5–15)
AST SERPL-CCNC: 26 U/L (ref 0–32)
BASOPHILS # BLD AUTO: 0.06 10*3/MM3 (ref 0–0.2)
BASOPHILS NFR BLD AUTO: 0.9 % (ref 0–1.5)
BILIRUB SERPL-MCNC: 0.2 MG/DL (ref 0.2–1.2)
BUN SERPL-MCNC: 11 MG/DL (ref 6–20)
BUN/CREAT SERPL: 14.9 (ref 7.3–30)
CALCIUM SPEC-SCNC: 9.9 MG/DL (ref 8.5–10.2)
CHLORIDE SERPL-SCNC: 99 MMOL/L (ref 98–107)
CO2 SERPL-SCNC: 26.9 MMOL/L (ref 22–29)
CREAT SERPL-MCNC: 0.74 MG/DL (ref 0.6–1.1)
DEPRECATED RDW RBC AUTO: 55 FL (ref 37–54)
EGFRCR SERPLBLD CKD-EPI 2021: 92.2 ML/MIN/1.73
EOSINOPHIL # BLD AUTO: 0.03 10*3/MM3 (ref 0–0.4)
EOSINOPHIL NFR BLD AUTO: 0.5 % (ref 0.3–6.2)
ERYTHROCYTE [DISTWIDTH] IN BLOOD BY AUTOMATED COUNT: 18.5 % (ref 12.3–15.4)
GLOBULIN UR ELPH-MCNC: 3.5 GM/DL (ref 1.8–3.5)
GLUCOSE SERPL-MCNC: 376 MG/DL (ref 74–124)
HCT VFR BLD AUTO: 36.4 % (ref 34–46.6)
HGB BLD-MCNC: 11.2 G/DL (ref 12–15.9)
IMM GRANULOCYTES # BLD AUTO: 0.05 10*3/MM3 (ref 0–0.05)
IMM GRANULOCYTES NFR BLD AUTO: 0.8 % (ref 0–0.5)
LYMPHOCYTES # BLD AUTO: 1.72 10*3/MM3 (ref 0.7–3.1)
LYMPHOCYTES NFR BLD AUTO: 26.4 % (ref 19.6–45.3)
MCH RBC QN AUTO: 28.9 PG (ref 26.6–33)
MCHC RBC AUTO-ENTMCNC: 30.8 G/DL (ref 31.5–35.7)
MCV RBC AUTO: 93.8 FL (ref 79–97)
MONOCYTES # BLD AUTO: 0.87 10*3/MM3 (ref 0.1–0.9)
MONOCYTES NFR BLD AUTO: 13.3 % (ref 5–12)
NEUTROPHILS NFR BLD AUTO: 3.79 10*3/MM3 (ref 1.7–7)
NEUTROPHILS NFR BLD AUTO: 58.1 % (ref 42.7–76)
NRBC BLD AUTO-RTO: 0 /100 WBC (ref 0–0.2)
PLATELET # BLD AUTO: 120 10*3/MM3 (ref 140–450)
PMV BLD AUTO: 10.8 FL (ref 6–12)
POTASSIUM SERPL-SCNC: 4.4 MMOL/L (ref 3.5–4.7)
PROT SERPL-MCNC: 7 G/DL (ref 6.3–8)
RBC # BLD AUTO: 3.88 10*6/MM3 (ref 3.77–5.28)
SODIUM SERPL-SCNC: 136 MMOL/L (ref 134–145)
WBC NRBC COR # BLD: 6.52 10*3/MM3 (ref 3.4–10.8)

## 2022-11-09 PROCEDURE — 25010000002 PALONOSETRON PER 25 MCG: Performed by: OBSTETRICS & GYNECOLOGY

## 2022-11-09 PROCEDURE — 96417 CHEMO IV INFUS EACH ADDL SEQ: CPT

## 2022-11-09 PROCEDURE — 85025 COMPLETE CBC W/AUTO DIFF WBC: CPT

## 2022-11-09 PROCEDURE — 25010000002 HEPARIN LOCK FLUSH PER 10 UNITS: Performed by: OBSTETRICS & GYNECOLOGY

## 2022-11-09 PROCEDURE — 25010000002 DIPHENHYDRAMINE PER 50 MG: Performed by: OBSTETRICS & GYNECOLOGY

## 2022-11-09 PROCEDURE — 25010000002 FOSAPREPITANT PER 1 MG: Performed by: OBSTETRICS & GYNECOLOGY

## 2022-11-09 PROCEDURE — 25010000002 CARBOPLATIN PER 50 MG: Performed by: OBSTETRICS & GYNECOLOGY

## 2022-11-09 PROCEDURE — 99214 OFFICE O/P EST MOD 30 MIN: CPT | Performed by: OBSTETRICS & GYNECOLOGY

## 2022-11-09 PROCEDURE — 96367 TX/PROPH/DG ADDL SEQ IV INF: CPT

## 2022-11-09 PROCEDURE — 96413 CHEMO IV INFUSION 1 HR: CPT

## 2022-11-09 PROCEDURE — 80053 COMPREHEN METABOLIC PANEL: CPT

## 2022-11-09 PROCEDURE — 96415 CHEMO IV INFUSION ADDL HR: CPT

## 2022-11-09 PROCEDURE — 96365 THER/PROPH/DIAG IV INF INIT: CPT

## 2022-11-09 PROCEDURE — 25010000002 DEXAMETHASONE SODIUM PHOSPHATE 100 MG/10ML SOLUTION 10 ML VIAL: Performed by: OBSTETRICS & GYNECOLOGY

## 2022-11-09 PROCEDURE — 25010000002 PACLITAXEL PER 1 MG: Performed by: OBSTETRICS & GYNECOLOGY

## 2022-11-09 PROCEDURE — 96375 TX/PRO/DX INJ NEW DRUG ADDON: CPT

## 2022-11-09 RX ORDER — OLANZAPINE 5 MG/1
5 TABLET ORAL ONCE
Status: COMPLETED | OUTPATIENT
Start: 2022-11-09 | End: 2022-11-09

## 2022-11-09 RX ORDER — FAMOTIDINE 10 MG/ML
20 INJECTION, SOLUTION INTRAVENOUS ONCE
Status: CANCELLED | OUTPATIENT
Start: 2022-11-09

## 2022-11-09 RX ORDER — SODIUM CHLORIDE 9 MG/ML
250 INJECTION, SOLUTION INTRAVENOUS ONCE
Status: COMPLETED | OUTPATIENT
Start: 2022-11-09 | End: 2022-11-09

## 2022-11-09 RX ORDER — HEPARIN SODIUM (PORCINE) LOCK FLUSH IV SOLN 100 UNIT/ML 100 UNIT/ML
500 SOLUTION INTRAVENOUS AS NEEDED
Status: CANCELLED | OUTPATIENT
Start: 2022-11-09

## 2022-11-09 RX ORDER — SODIUM CHLORIDE 9 MG/ML
250 INJECTION, SOLUTION INTRAVENOUS ONCE
Status: CANCELLED | OUTPATIENT
Start: 2022-11-09

## 2022-11-09 RX ORDER — SODIUM CHLORIDE 0.9 % (FLUSH) 0.9 %
10 SYRINGE (ML) INJECTION AS NEEDED
Status: CANCELLED | OUTPATIENT
Start: 2022-11-09

## 2022-11-09 RX ORDER — PANTOPRAZOLE SODIUM 20 MG/1
TABLET, DELAYED RELEASE ORAL
COMMUNITY
Start: 2022-10-26

## 2022-11-09 RX ORDER — HEPARIN SODIUM (PORCINE) LOCK FLUSH IV SOLN 100 UNIT/ML 100 UNIT/ML
500 SOLUTION INTRAVENOUS AS NEEDED
Status: DISCONTINUED | OUTPATIENT
Start: 2022-11-09 | End: 2022-11-09 | Stop reason: HOSPADM

## 2022-11-09 RX ORDER — FAMOTIDINE 10 MG/ML
20 INJECTION, SOLUTION INTRAVENOUS AS NEEDED
Status: CANCELLED | OUTPATIENT
Start: 2022-11-09

## 2022-11-09 RX ORDER — SODIUM CHLORIDE 0.9 % (FLUSH) 0.9 %
10 SYRINGE (ML) INJECTION AS NEEDED
Status: DISCONTINUED | OUTPATIENT
Start: 2022-11-09 | End: 2022-11-09 | Stop reason: HOSPADM

## 2022-11-09 RX ORDER — DIPHENHYDRAMINE HYDROCHLORIDE 50 MG/ML
50 INJECTION INTRAMUSCULAR; INTRAVENOUS AS NEEDED
Status: CANCELLED | OUTPATIENT
Start: 2022-11-09

## 2022-11-09 RX ORDER — OLANZAPINE 5 MG/1
5 TABLET ORAL ONCE
Status: CANCELLED | OUTPATIENT
Start: 2022-11-09 | End: 2022-11-09

## 2022-11-09 RX ORDER — PALONOSETRON 0.05 MG/ML
0.25 INJECTION, SOLUTION INTRAVENOUS ONCE
Status: COMPLETED | OUTPATIENT
Start: 2022-11-09 | End: 2022-11-09

## 2022-11-09 RX ORDER — FAMOTIDINE 10 MG/ML
20 INJECTION, SOLUTION INTRAVENOUS ONCE
Status: COMPLETED | OUTPATIENT
Start: 2022-11-09 | End: 2022-11-09

## 2022-11-09 RX ORDER — PALONOSETRON 0.05 MG/ML
0.25 INJECTION, SOLUTION INTRAVENOUS ONCE
Status: CANCELLED | OUTPATIENT
Start: 2022-11-09

## 2022-11-09 RX ADMIN — Medication 10 ML: at 15:30

## 2022-11-09 RX ADMIN — PALONOSETRON 0.25 MG: 0.05 INJECTION, SOLUTION INTRAVENOUS at 10:14

## 2022-11-09 RX ADMIN — FAMOTIDINE 20 MG: 10 INJECTION INTRAVENOUS at 10:14

## 2022-11-09 RX ADMIN — PACLITAXEL 290 MG: 6 INJECTION, SOLUTION, CONCENTRATE INTRAVENOUS at 11:25

## 2022-11-09 RX ADMIN — OLANZAPINE 5 MG: 5 TABLET, FILM COATED ORAL at 10:14

## 2022-11-09 RX ADMIN — CARBOPLATIN 550 MG: 10 INJECTION INTRAVENOUS at 14:50

## 2022-11-09 RX ADMIN — DIPHENHYDRAMINE HYDROCHLORIDE 50 MG: 50 INJECTION, SOLUTION INTRAMUSCULAR; INTRAVENOUS at 10:14

## 2022-11-09 RX ADMIN — SODIUM CHLORIDE 250 ML: 9 INJECTION, SOLUTION INTRAVENOUS at 10:14

## 2022-11-09 RX ADMIN — FOSAPREPITANT DIMEGLUMINE 100 ML: 150 INJECTION, POWDER, LYOPHILIZED, FOR SOLUTION INTRAVENOUS at 10:51

## 2022-11-09 RX ADMIN — Medication 500 UNITS: at 15:30

## 2022-11-09 RX ADMIN — DEXAMETHASONE SODIUM PHOSPHATE 20 MG: 10 INJECTION, SOLUTION INTRAMUSCULAR; INTRAVENOUS at 10:35

## 2022-11-09 NOTE — PROGRESS NOTES
Age: 62 y.o.  Sex: female  :  1960  MRN: [id number]       REFERRING PHYSICIAN: No ref. 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 Last berny showed ANC of 1.02.  Her  has dropped nicely to 1182. Glucose is still elevated per her usual. Yet to see endocrine.       Oncology/Hematology History Overview Note   • 22: CT AP - Findings suspicious for peritoneal carcinomatosis with ascites and metastatic disease to both ovaries. Recommend diagnostic paracentesis for cytology. Also noted is a porcelain gallbladder and pancreatic atrophy.  • 22: Paracentesis - rare atypical cells suspicious but not diagnostic for carcinoma. Reactive mesothelium & lymphocytes.   • 22: Pelvic US - The bilateral ovaries appear slightly enlarged and heterogeneous but no dominant adnexal masses are seen.  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   • 22: Paracentesis - removed 3400 mL of dark katiana fluid       22: C3 Carbo/Taxol         CURRENT TREATMENT:  CYCLE 1: 22 - Taxol 175 mg/m2  Carbo AUC 6   CYCLE 2:  10/19/22 - Taxol 175 mg/m2  Carbo AUC 6   • ANC: 1.02               Fallopian tube carcinoma, unspecified laterality (HCC)   2022 Initial Diagnosis    Fallopian tube carcinoma, unspecified laterality (HCC)     2022 -  Chemotherapy    OP OVARIAN PACLitaxel / CARBOplatin (Q21D)         Past Medical History:  Past Medical History:   Diagnosis Date   • Anemia    • Coronary artery disease    • Diabetes mellitus (HCC)    • Hyperlipidemia    • Hypertension    • Peripheral vascular disease, secondary (HCC)    • Stroke (HCC)        Past Surgical History:  Past Surgical History:   Procedure Laterality Date   • ABOVE KNEE AMPUTATION Left    • BELOW KNEE AMPUTATION Right    • TOE SURGERY     • VENOUS ACCESS DEVICE (PORT)  INSERTION Right 9/28/2022    Procedure: INSERTION VENOUS ACCESS DEVICE;  Surgeon: Adama Barlow MD;  Location: [facility] OR;  Service: General;  Laterality: Right;        MEDICATIONS:    Current Outpatient Medications:   •  acetaminophen (TYLENOL) 325 MG tablet, Take 650 mg by mouth Every 6 (Six) Hours As Needed for Mild Pain (1-3)., Disp: , Rfl:   •  aspirin 81 MG chewable tablet, Chew 81 mg Daily., Disp: , Rfl:   •  atorvastatin (LIPITOR) 20 MG tablet, Take 20 mg by mouth Daily., Disp: , Rfl:   •  carvedilol (COREG) 3.125 MG tablet, Take 3.125 mg by mouth 2 (Two) Times a Day With Meals., Disp: , Rfl:   •  Continuous Blood Gluc Sensor (FreeStyle Dori 2 Sensor) misc, , Disp: , Rfl:   •  docusate sodium (COLACE) 100 MG capsule, Take 100 mg by mouth 2 (Two) Times a Day., Disp: , Rfl:   •  gabapentin (NEURONTIN) 400 MG capsule, Take 400 mg by mouth 3 (Three) Times a Day., Disp: , Rfl:   •  insulin aspart (novoLOG) 100 UNIT/ML injection, Inject 4 Units under the skin 3 (Three) Times a Day Before Meals., Disp: , Rfl:   •  Insulin Glargine (LANTUS SC), Inject 20 Units under the skin 2 (Two) Times a Day., Disp: , Rfl:   •  magnesium oxide (MAG-OX) 400 MG tablet, Take 1 tablet by mouth 2 (Two) Times a Day for 90 days., Disp: 60 tablet, Rfl: 2  •  Multiple Vitamins-Minerals (MULTIVITAMIN WITH MINERALS) tablet tablet, Take 1 tablet by mouth Daily., Disp: , Rfl:   •  O2 (OXYGEN), Inhale 2 L/min 1 (One) Time., Disp: , Rfl:   •  OLANZapine (ZyPREXA) 5 MG tablet, Take 1 tablet by mouth Every Night. Take on days 2, 3 and 4 after chemotherapy., Disp: 3 tablet, Rfl: 5  •  ondansetron (ZOFRAN) 4 MG tablet, Take 4 mg by mouth Every 8 (Eight) Hours As Needed for Nausea or Vomiting., Disp: , Rfl:   •  ondansetron (ZOFRAN) 8 MG tablet, Take 1 tablet by mouth 3 (Three) Times a Day As Needed for Nausea or Vomiting., Disp: 30 tablet, Rfl: 5  •  pantoprazole (PROTONIX) 40 MG EC tablet, Take 40 mg by mouth Daily., Disp: , Rfl:   •   saccharomyces boulardii (FLORASTOR) 250 MG capsule, Take 250 mg by mouth 2 (Two) Times a Day., Disp: , Rfl:   •  senna (SENOKOT) 8.6 MG tablet tablet, Take  by mouth Every Night., Disp: , Rfl:   •  sennosides-docusate (senna-docusate sodium) 8.6-50 MG per tablet, Take 2 tablets by mouth Daily for 30 days., Disp: 60 tablet, Rfl: 0    ALLERGIES:  Allergies   Allergen Reactions   • Beta Adrenergic Blockers Other (See Comments)     Make PAD pain / claudication worse         ROS:  CONSTITUTIONAL:  Denies fever or chills.   NEUROLOGIC:  Denies headache, focal weakness or sensory changes.   EYES:  Denies change in visual acuity.  HEENT:  Denies nasal congestion or sore throat.   RESPIRATORY:  Denies cough or shortness of breath.   CARDIOVASCULAR:  Denies chest pain or edema.   GI:  Denies abdominal pain, nausea, vomiting, bloody stools or diarrhea.   :  Denies dysuria, leaking or incontinence.  MUSCULOSKELETAL:  Denies back pain or joint pain.   INTEGUMENT:  Denies rash.   ENDOCRINE:  Denies polyuria or polydipsia.   LYMPHATIC:  Denies swollen glands or lymphedema.   PSYCHIATRIC:  Denies depression or anxiety.      PHYSICAL EXAM:  There were no vitals filed for this visit.  There is no height or weight on file to calculate BMI.  Current Status 11/9/2022   ECOG score 2     PHQ-9 Total Score:           GEN: alert and oriented x 3, normal affect, well nourished and hydrated.  CARDIO: regular rate and rhythm.  PULM: Lungs CTA b.l, no RRW   ABD:Soft, nontender, nondistended.   GYN: defer today   EXT: No petechiae, bruising, rash, candida. No CCE.           Result Review :  The pertinent labs, images, and/or pathology as noted in the oncology history were reviewed independently and discussed with the patient.   Jelly Montalvo DO   11/09/2022    [facility]   [place]ending     [facility]:  CT scan ordered for after cycle 2 to determine if she is candidate for interval debulking - not performed.        ASSESSMENT :  • HG mullerian  carcinoma - dx via omental bx 9/23/22  • Paracentesis 3400mL  9/26/22  •   = 6024  • C2 Carboplatin AUC 6 (550mg capped) /   Taxol 175mg/m2 - 10/19/22  • Uncontrolled DM - A1C 11 -  17U humalog AC, Lantus 68U BID   • Non compliance with diabetes management  • Porcelain gallbladder   • CAD - on ASA and statin   • HTN   • Peripheral vascular disease- post R BKA /  L AKA          PLAN :  • Patient is clear to proceed with cycle 3 today.   • CT scan had been planned for 3 weeks after cycle 2 for interval debulking planning- ordered, not yet performed therefore will need to continue with cycle 3 and assess with CT 3 weeks after cycle 3, instead of 2, this was explained to patient.     • Patient needs strict glucose control - steroids will create greater glucose abberation. Pt states she does not have any insulin at this time but has been given Rx, spoke with Ms Spence, pt is highly non compliant with this. Will refer to endocrine. Above noted is recommended insulin regimen, pt is not taking.     • Friend for life referral - pt has very little social support. Will need to establish who can make medical decision as we go forward with her care.   - Will Cedeno (relative) [phone]  - Sherrie Rivero (relative) [phone]  - Roe Niamarilis (son) [phone]        CC: Jesi Spence [phone]          Jelly Montalvo D.O  11/9/2022    Gynecologic Oncology   [address] [place] [address]  [phone] office

## 2022-11-14 DIAGNOSIS — C57.00 FALLOPIAN TUBE CARCINOMA, UNSPECIFIED LATERALITY: Primary | ICD-10-CM

## 2022-11-16 ENCOUNTER — LAB (OUTPATIENT)
Dept: LAB | Facility: HOSPITAL | Age: 62
End: 2022-11-16

## 2022-11-16 ENCOUNTER — DOCUMENTATION (OUTPATIENT)
Dept: GYNECOLOGIC ONCOLOGY | Facility: CLINIC | Age: 62
End: 2022-11-16

## 2022-11-16 DIAGNOSIS — C57.00 FALLOPIAN TUBE CARCINOMA, UNSPECIFIED LATERALITY: ICD-10-CM

## 2022-11-16 DIAGNOSIS — G62.9 NEUROPATHY: Primary | ICD-10-CM

## 2022-11-16 LAB
ALBUMIN SERPL-MCNC: 4 G/DL (ref 3.5–5.2)
ALBUMIN/GLOB SERPL: 1.2 G/DL (ref 1.1–2.4)
ALP SERPL-CCNC: 99 U/L (ref 38–116)
ALT SERPL W P-5'-P-CCNC: 27 U/L (ref 0–33)
ANION GAP SERPL CALCULATED.3IONS-SCNC: 13.3 MMOL/L (ref 5–15)
AST SERPL-CCNC: 32 U/L (ref 0–32)
BASOPHILS # BLD AUTO: 0.01 10*3/MM3 (ref 0–0.2)
BASOPHILS NFR BLD AUTO: 0.4 % (ref 0–1.5)
BILIRUB SERPL-MCNC: 0.3 MG/DL (ref 0.2–1.2)
BUN SERPL-MCNC: 17 MG/DL (ref 6–20)
BUN/CREAT SERPL: 24.3 (ref 7.3–30)
CALCIUM SPEC-SCNC: 9.6 MG/DL (ref 8.5–10.2)
CHLORIDE SERPL-SCNC: 97 MMOL/L (ref 98–107)
CO2 SERPL-SCNC: 25.7 MMOL/L (ref 22–29)
CREAT SERPL-MCNC: 0.7 MG/DL (ref 0.6–1.1)
DEPRECATED RDW RBC AUTO: 58.7 FL (ref 37–54)
EGFRCR SERPLBLD CKD-EPI 2021: 98.5 ML/MIN/1.73
EOSINOPHIL # BLD AUTO: 0.04 10*3/MM3 (ref 0–0.4)
EOSINOPHIL NFR BLD AUTO: 1.4 % (ref 0.3–6.2)
ERYTHROCYTE [DISTWIDTH] IN BLOOD BY AUTOMATED COUNT: 18.6 % (ref 12.3–15.4)
GLOBULIN UR ELPH-MCNC: 3.4 GM/DL (ref 1.8–3.5)
GLUCOSE SERPL-MCNC: 368 MG/DL (ref 74–124)
HCT VFR BLD AUTO: 37.7 % (ref 34–46.6)
HGB BLD-MCNC: 12.1 G/DL (ref 12–15.9)
IMM GRANULOCYTES # BLD AUTO: 0.02 10*3/MM3 (ref 0–0.05)
IMM GRANULOCYTES NFR BLD AUTO: 0.7 % (ref 0–0.5)
LYMPHOCYTES # BLD AUTO: 1.05 10*3/MM3 (ref 0.7–3.1)
LYMPHOCYTES NFR BLD AUTO: 36.8 % (ref 19.6–45.3)
MAGNESIUM SERPL-MCNC: 1.6 MG/DL (ref 1.8–2.5)
MCH RBC QN AUTO: 29 PG (ref 26.6–33)
MCHC RBC AUTO-ENTMCNC: 32.1 G/DL (ref 31.5–35.7)
MCV RBC AUTO: 90.4 FL (ref 79–97)
MONOCYTES # BLD AUTO: 0.09 10*3/MM3 (ref 0.1–0.9)
MONOCYTES NFR BLD AUTO: 3.2 % (ref 5–12)
NEUTROPHILS NFR BLD AUTO: 1.64 10*3/MM3 (ref 1.7–7)
NEUTROPHILS NFR BLD AUTO: 57.5 % (ref 42.7–76)
NRBC BLD AUTO-RTO: 0 /100 WBC (ref 0–0.2)
PLATELET # BLD AUTO: 105 10*3/MM3 (ref 140–450)
PMV BLD AUTO: 11.6 FL (ref 6–12)
POTASSIUM SERPL-SCNC: 4.7 MMOL/L (ref 3.5–4.7)
PROT SERPL-MCNC: 7.4 G/DL (ref 6.3–8)
RBC # BLD AUTO: 4.17 10*6/MM3 (ref 3.77–5.28)
SODIUM SERPL-SCNC: 136 MMOL/L (ref 134–145)
WBC NRBC COR # BLD: 2.85 10*3/MM3 (ref 3.4–10.8)

## 2022-11-16 PROCEDURE — 85025 COMPLETE CBC W/AUTO DIFF WBC: CPT

## 2022-11-16 PROCEDURE — 80053 COMPREHEN METABOLIC PANEL: CPT

## 2022-11-16 PROCEDURE — 36415 COLL VENOUS BLD VENIPUNCTURE: CPT

## 2022-11-16 PROCEDURE — 83735 ASSAY OF MAGNESIUM: CPT

## 2022-11-16 RX ORDER — GABAPENTIN 300 MG/1
300 CAPSULE ORAL 3 TIMES DAILY
Qty: 90 CAPSULE | Refills: 3 | Status: SHIPPED | OUTPATIENT
Start: 2022-11-16

## 2022-11-16 NOTE — NURSING NOTE
Patient is here for lab review with RN.  CBC reviewed, counts are stable for this patient at this time. Copy of labs given to patient and follow up appointment reviewed. Patient is instructed to call the office with any concerns or new symptoms prior to next visit. Patient verbalized understanding and discharged in stable condition.    CBC:      Lab 11/16/22  1127   WBC 2.85*   HEMOGLOBIN 12.1   HEMATOCRIT 37.7   PLATELETS 105*   NEUTROS ABS 1.64*   IMMATURE GRANS (ABS) 0.02   LYMPHS ABS 1.05   MONOS ABS 0.09*   EOS ABS 0.04   MCV 90.4

## 2022-11-17 ENCOUNTER — APPOINTMENT (OUTPATIENT)
Dept: CT IMAGING | Facility: HOSPITAL | Age: 62
End: 2022-11-17

## 2022-11-21 ENCOUNTER — APPOINTMENT (OUTPATIENT)
Dept: CT IMAGING | Facility: HOSPITAL | Age: 62
End: 2022-11-21

## 2022-11-22 NOTE — TELEPHONE ENCOUNTER
Caller: Joanne Contreras    Relationship: Self    Best call back number:  [phone]    What is the best time to reach you: ASAP, AS CT APPT. IS ON 11/28/2022    Who are you requesting to speak with (clinical staff, provider,  specific staff member): SUSAN. DESK    Do you know the name of the person who called: JOANNE    What was the call regarding: PATIENT HAS NEVER HEARD BACK & [employer] HAS NOT RECEIVED A LETTER FROM US & THEY HAVE TO HAVE SO THEY CAN ARRANGE TRANSPORTATION FOR PATIENT, THEIR NUMBER -[phone].    Do you require a callback: PLEASE CONTACT PATIENT TO MAKE SURE THAT [employer] HAS WHAT THEY NEED.

## 2022-11-23 NOTE — TELEPHONE ENCOUNTER
Received fax from [employer], waiting on provider signature so I can send back. Spoke with patient to relay this information. She verbalized understanding.

## 2022-11-23 NOTE — TELEPHONE ENCOUNTER
Spoke with Rupa at [employer], received fax and referral is good until 5/23/2023. Confirmation number [id number].    Informed patient the referral was completed. She verbalized understanding.

## 2022-11-28 ENCOUNTER — HOSPITAL ENCOUNTER (OUTPATIENT)
Dept: CT IMAGING | Facility: HOSPITAL | Age: 62
Discharge: HOME | End: 2022-11-28
Admitting: OBSTETRICS & GYNECOLOGY

## 2022-11-28 DIAGNOSIS — C57.00 FALLOPIAN TUBE CARCINOMA, UNSPECIFIED LATERALITY: ICD-10-CM

## 2022-11-28 PROCEDURE — 0 IOPAMIDOL PER 1 ML: Performed by: OBSTETRICS & GYNECOLOGY

## 2022-11-28 PROCEDURE — 71260 CT THORAX DX C+: CPT

## 2022-11-28 PROCEDURE — 74177 CT ABD & PELVIS W/CONTRAST: CPT

## 2022-11-28 RX ADMIN — IOPAMIDOL 100 ML: 755 INJECTION, SOLUTION INTRAVENOUS at 14:51

## 2022-11-30 ENCOUNTER — PREP FOR SURGERY (OUTPATIENT)
Dept: OTHER | Facility: HOSPITAL | Age: 62
End: 2022-11-30

## 2022-11-30 ENCOUNTER — OFFICE VISIT (OUTPATIENT)
Dept: GYNECOLOGIC ONCOLOGY | Facility: CLINIC | Age: 62
End: 2022-11-30

## 2022-11-30 ENCOUNTER — INFUSION (OUTPATIENT)
Dept: ONCOLOGY | Facility: HOSPITAL | Age: 62
End: 2022-11-30

## 2022-11-30 ENCOUNTER — APPOINTMENT (OUTPATIENT)
Dept: CT IMAGING | Facility: HOSPITAL | Age: 62
End: 2022-11-30

## 2022-11-30 VITALS
HEART RATE: 91 BPM | BODY MASS INDEX: 34.96 KG/M2 | RESPIRATION RATE: 12 BRPM | OXYGEN SATURATION: 97 % | DIASTOLIC BLOOD PRESSURE: 99 MMHG | SYSTOLIC BLOOD PRESSURE: 188 MMHG | WEIGHT: 145 LBS | TEMPERATURE: 96.6 F

## 2022-11-30 VITALS
OXYGEN SATURATION: 100 % | RESPIRATION RATE: 16 BRPM | DIASTOLIC BLOOD PRESSURE: 85 MMHG | TEMPERATURE: 97.8 F | BODY MASS INDEX: 35.13 KG/M2 | HEART RATE: 85 BPM | WEIGHT: 145.7 LBS | SYSTOLIC BLOOD PRESSURE: 172 MMHG

## 2022-11-30 DIAGNOSIS — C57.00 FALLOPIAN TUBE CARCINOMA, UNSPECIFIED LATERALITY: Primary | ICD-10-CM

## 2022-11-30 DIAGNOSIS — E13.65 OTHER SPECIFIED DIABETES MELLITUS WITH HYPERGLYCEMIA, UNSPECIFIED WHETHER LONG TERM INSULIN USE: Primary | ICD-10-CM

## 2022-11-30 DIAGNOSIS — C57.00 FALLOPIAN TUBE CARCINOMA, UNSPECIFIED LATERALITY: ICD-10-CM

## 2022-11-30 DIAGNOSIS — Z45.9 ENCOUNTER FOR MANAGEMENT OF IMPLANTED DEVICE: ICD-10-CM

## 2022-11-30 DIAGNOSIS — Z79.01 ANTICOAGULATED: ICD-10-CM

## 2022-11-30 PROBLEM — C56.9 OVARIAN CANCER: Status: ACTIVE | Noted: 2022-11-30

## 2022-11-30 LAB
ALBUMIN SERPL-MCNC: 3.6 G/DL (ref 3.5–5.2)
ALBUMIN/GLOB SERPL: 1.1 G/DL (ref 1.1–2.4)
ALP SERPL-CCNC: 95 U/L (ref 38–116)
ALT SERPL W P-5'-P-CCNC: 20 U/L (ref 0–33)
ANION GAP SERPL CALCULATED.3IONS-SCNC: 13.1 MMOL/L (ref 5–15)
AST SERPL-CCNC: 26 U/L (ref 0–32)
BASOPHILS # BLD AUTO: 0.02 10*3/MM3 (ref 0–0.2)
BASOPHILS NFR BLD AUTO: 0.5 % (ref 0–1.5)
BILIRUB SERPL-MCNC: 0.2 MG/DL (ref 0.2–1.2)
BUN SERPL-MCNC: 10 MG/DL (ref 6–20)
BUN/CREAT SERPL: 14.5 (ref 7.3–30)
CALCIUM SPEC-SCNC: 10 MG/DL (ref 8.5–10.2)
CANCER AG125 SERPL QL: 70.2 U/ML (ref 0–38.1)
CHLORIDE SERPL-SCNC: 103 MMOL/L (ref 98–107)
CO2 SERPL-SCNC: 25.9 MMOL/L (ref 22–29)
CREAT SERPL-MCNC: 0.69 MG/DL (ref 0.6–1.1)
DEPRECATED RDW RBC AUTO: 73.9 FL (ref 37–54)
EGFRCR SERPLBLD CKD-EPI 2021: 98.9 ML/MIN/1.73
EOSINOPHIL # BLD AUTO: 0.05 10*3/MM3 (ref 0–0.4)
EOSINOPHIL NFR BLD AUTO: 1.2 % (ref 0.3–6.2)
ERYTHROCYTE [DISTWIDTH] IN BLOOD BY AUTOMATED COUNT: 22.2 % (ref 12.3–15.4)
GLOBULIN UR ELPH-MCNC: 3.3 GM/DL (ref 1.8–3.5)
GLUCOSE SERPL-MCNC: 303 MG/DL (ref 74–124)
HCT VFR BLD AUTO: 33.3 % (ref 34–46.6)
HGB BLD-MCNC: 10.5 G/DL (ref 12–15.9)
IMM GRANULOCYTES # BLD AUTO: 0.01 10*3/MM3 (ref 0–0.05)
IMM GRANULOCYTES NFR BLD AUTO: 0.2 % (ref 0–0.5)
LYMPHOCYTES # BLD AUTO: 1.63 10*3/MM3 (ref 0.7–3.1)
LYMPHOCYTES NFR BLD AUTO: 39.5 % (ref 19.6–45.3)
MCH RBC QN AUTO: 30 PG (ref 26.6–33)
MCHC RBC AUTO-ENTMCNC: 31.5 G/DL (ref 31.5–35.7)
MCV RBC AUTO: 95.1 FL (ref 79–97)
MONOCYTES # BLD AUTO: 0.33 10*3/MM3 (ref 0.1–0.9)
MONOCYTES NFR BLD AUTO: 8 % (ref 5–12)
NEUTROPHILS NFR BLD AUTO: 2.09 10*3/MM3 (ref 1.7–7)
NEUTROPHILS NFR BLD AUTO: 50.6 % (ref 42.7–76)
NRBC BLD AUTO-RTO: 0 /100 WBC (ref 0–0.2)
PLATELET # BLD AUTO: 114 10*3/MM3 (ref 140–450)
PMV BLD AUTO: 10.2 FL (ref 6–12)
POTASSIUM SERPL-SCNC: 4.2 MMOL/L (ref 3.5–4.7)
PROT SERPL-MCNC: 6.9 G/DL (ref 6.3–8)
RBC # BLD AUTO: 3.5 10*6/MM3 (ref 3.77–5.28)
SODIUM SERPL-SCNC: 142 MMOL/L (ref 134–145)
WBC NRBC COR # BLD: 4.13 10*3/MM3 (ref 3.4–10.8)

## 2022-11-30 PROCEDURE — 86304 IMMUNOASSAY TUMOR CA 125: CPT | Performed by: OBSTETRICS & GYNECOLOGY

## 2022-11-30 PROCEDURE — 25010000002 HEPARIN LOCK FLUSH PER 10 UNITS: Performed by: OBSTETRICS & GYNECOLOGY

## 2022-11-30 PROCEDURE — 99214 OFFICE O/P EST MOD 30 MIN: CPT | Performed by: OBSTETRICS & GYNECOLOGY

## 2022-11-30 PROCEDURE — 36591 DRAW BLOOD OFF VENOUS DEVICE: CPT

## 2022-11-30 PROCEDURE — 85025 COMPLETE CBC W/AUTO DIFF WBC: CPT

## 2022-11-30 PROCEDURE — 80053 COMPREHEN METABOLIC PANEL: CPT

## 2022-11-30 RX ORDER — SODIUM CHLORIDE 0.9 % (FLUSH) 0.9 %
10 SYRINGE (ML) INJECTION AS NEEDED
Status: DISCONTINUED | OUTPATIENT
Start: 2022-11-30 | End: 2022-11-30 | Stop reason: HOSPADM

## 2022-11-30 RX ORDER — SODIUM CHLORIDE 0.9 % (FLUSH) 0.9 %
10 SYRINGE (ML) INJECTION EVERY 12 HOURS SCHEDULED
Status: CANCELLED | OUTPATIENT
Start: 2022-11-30

## 2022-11-30 RX ORDER — SODIUM CHLORIDE 9 MG/ML
40 INJECTION, SOLUTION INTRAVENOUS AS NEEDED
Status: CANCELLED | OUTPATIENT
Start: 2022-11-30

## 2022-11-30 RX ORDER — SODIUM CHLORIDE 0.9 % (FLUSH) 0.9 %
10 SYRINGE (ML) INJECTION AS NEEDED
Status: CANCELLED | OUTPATIENT
Start: 2022-11-30

## 2022-11-30 RX ORDER — ONDANSETRON 2 MG/ML
4 INJECTION INTRAMUSCULAR; INTRAVENOUS EVERY 6 HOURS PRN
Status: CANCELLED | OUTPATIENT
Start: 2022-11-30

## 2022-11-30 RX ORDER — INSULIN LISPRO 100 [IU]/ML
INJECTION, SOLUTION INTRAVENOUS; SUBCUTANEOUS
COMMUNITY
Start: 2022-11-19 | End: 2023-04-05

## 2022-11-30 RX ORDER — CEFAZOLIN SODIUM 2 G/100ML
2 INJECTION, SOLUTION INTRAVENOUS ONCE
Status: CANCELLED | OUTPATIENT
Start: 2022-11-30 | End: 2022-11-30

## 2022-11-30 RX ORDER — HEPARIN SODIUM (PORCINE) LOCK FLUSH IV SOLN 100 UNIT/ML 100 UNIT/ML
500 SOLUTION INTRAVENOUS AS NEEDED
Status: CANCELLED | OUTPATIENT
Start: 2022-11-30

## 2022-11-30 RX ORDER — SODIUM CHLORIDE, SODIUM LACTATE, POTASSIUM CHLORIDE, CALCIUM CHLORIDE 600; 310; 30; 20 MG/100ML; MG/100ML; MG/100ML; MG/100ML
100 INJECTION, SOLUTION INTRAVENOUS CONTINUOUS
Status: CANCELLED | OUTPATIENT
Start: 2022-11-30

## 2022-11-30 RX ORDER — HEPARIN SODIUM (PORCINE) LOCK FLUSH IV SOLN 100 UNIT/ML 100 UNIT/ML
500 SOLUTION INTRAVENOUS AS NEEDED
Status: DISCONTINUED | OUTPATIENT
Start: 2022-11-30 | End: 2022-11-30 | Stop reason: HOSPADM

## 2022-11-30 RX ADMIN — Medication 10 ML: at 09:31

## 2022-11-30 RX ADMIN — Medication 500 UNITS: at 09:32

## 2022-11-30 NOTE — NURSING NOTE
No treatment today per . Port de accessed positive blood return/heparin locked. Pt sent down to office per Lorelei GRIMALDO for  to go over the pts scans.

## 2022-11-30 NOTE — PROGRESS NOTES
Age: 62 y.o.  Sex: female  :  1960  MRN: [id number]       REFERRING PHYSICIAN: No ref. provider found    This note has been reviewed and updated as needed in order to reflect current and accurate state.  2022    Joanne Contreras presents to [facility] to review her interval CT scans during Neoadjuvant chemotherapy. 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 CT imaging shows resolution of ascites, regression of LAD.        Oncology/Hematology History Overview Note   • 22: CT AP - Findings suspicious for peritoneal carcinomatosis with ascites and metastatic disease to both ovaries. Recommend diagnostic paracentesis for cytology. Also noted is a porcelain gallbladder and pancreatic atrophy.  • 22: Paracentesis - rare atypical cells suspicious but not diagnostic for carcinoma. Reactive mesothelium & lymphocytes.   • 22: Pelvic US - The bilateral ovaries appear slightly enlarged and heterogeneous but no dominant adnexal masses are seen.  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   • 22: Paracentesis - removed 3400 mL of dark katiana fluid   • 22: CT C/A/P - Interval response to therapy. Significant interval regression of abdominal and pelvic ascites with loculated features and associated peritoneal thickening and abnormal enhancement. Interval decrease in retroperitoneal adenopathy. No new evidence of solid organ metastasis. Dilated porcelain gallbladder with a rind of persistent soft tissue thickening similar to the prior study. No new biliary ductal dilatation. The ovaries appear less prominent and demonstrate interval decrease in abnormal enhancement and cystic change. Advanced atherosclerotic change. Intermittent and/or absence of flow within the bilateral iliac systems. Evidence of prior vascular surgery.      22: Review CT Scan         CURRENT  TREATMENT:  CYCLE 1: 9/28/22 - Taxol 175 mg/m2  Carbo AUC 6   CYCLE 2:  10/19/22 - Taxol 175 mg/m2  Carbo AUC 6   • ANC: 1.02  CYCLE 3: 11/9/22 - Taxol 175 mg/m2  Carbo AUC 6                Fallopian tube carcinoma, unspecified laterality (HCC)   9/27/2022 Initial Diagnosis    Fallopian tube carcinoma, unspecified laterality (HCC)     9/28/2022 -  Chemotherapy    OP OVARIAN PACLitaxel / CARBOplatin (Q21D)         Past Medical History:  Past Medical History:   Diagnosis Date   • Anemia    • Coronary artery disease    • Diabetes mellitus (HCC)    • Hyperlipidemia    • Hypertension    • Peripheral vascular disease, secondary (HCC)    • Stroke (HCC) 2022       Past Surgical History:  Past Surgical History:   Procedure Laterality Date   • ABOVE KNEE AMPUTATION Left    • BELOW KNEE AMPUTATION Right    • TOE SURGERY     • VENOUS ACCESS DEVICE (PORT) INSERTION Right 9/28/2022    Procedure: INSERTION VENOUS ACCESS DEVICE;  Surgeon: Adama Barlow MD;  Location: [facility];  Service: General;  Laterality: Right;        MEDICATIONS:    Current Outpatient Medications:   •  aspirin 81 MG chewable tablet, Chew 81 mg Daily., Disp: , Rfl:   •  carvedilol (COREG) 3.125 MG tablet, Take 3.125 mg by mouth 2 (Two) Times a Day With Meals., Disp: , Rfl:   •  gabapentin (NEURONTIN) 300 MG capsule, Take 1 capsule by mouth 3 (Three) Times a Day., Disp: 90 capsule, Rfl: 3  •  insulin aspart (novoLOG) 100 UNIT/ML injection, Inject 4 Units under the skin 3 (Three) Times a Day Before Meals., Disp: , Rfl:   •  Insulin Glargine (LANTUS SC), Inject 20 Units under the skin 2 (Two) Times a Day., Disp: , Rfl:   •  Multiple Vitamins-Minerals (MULTIVITAMIN WITH MINERALS) tablet tablet, Take 1 tablet by mouth Daily., Disp: , Rfl:   •  ondansetron (ZOFRAN) 4 MG tablet, Take 4 mg by mouth Every 8 (Eight) Hours As Needed for Nausea or Vomiting., Disp: , Rfl:   •  ondansetron (ZOFRAN) 8 MG tablet, Take 1 tablet by mouth 3 (Three) Times a Day As  Needed for Nausea or Vomiting., Disp: 30 tablet, Rfl: 5  •  pantoprazole (PROTONIX) 20 MG EC tablet, , Disp: , Rfl:   •  pantoprazole (PROTONIX) 40 MG EC tablet, Take 40 mg by mouth Daily., Disp: , Rfl:   •  senna (SENOKOT) 8.6 MG tablet tablet, Take  by mouth Every Night., Disp: , Rfl:   •  acetaminophen (TYLENOL) 325 MG tablet, Take 650 mg by mouth Every 6 (Six) Hours As Needed for Mild Pain (1-3)., Disp: , Rfl:   •  atorvastatin (LIPITOR) 20 MG tablet, Take 20 mg by mouth Daily., Disp: , Rfl:   •  Continuous Blood Gluc Sensor (FreeStyle Dori 2 Sensor) misc, , Disp: , Rfl:   •  docusate sodium (COLACE) 100 MG capsule, Take 100 mg by mouth 2 (Two) Times a Day., Disp: , Rfl:   •  Insulin Lispro, 1 Unit Dial, (HUMALOG) 100 UNIT/ML solution pen-injector, , Disp: , Rfl:   •  magnesium oxide (MAG-OX) 400 MG tablet, Take 1 tablet by mouth 2 (Two) Times a Day for 90 days., Disp: 60 tablet, Rfl: 2  •  O2 (OXYGEN), Inhale 2 L/min 1 (One) Time., Disp: , Rfl:   •  OLANZapine (ZyPREXA) 5 MG tablet, Take 1 tablet by mouth Every Night. Take on days 2, 3 and 4 after chemotherapy., Disp: 3 tablet, Rfl: 5  •  saccharomyces boulardii (FLORASTOR) 250 MG capsule, Take 250 mg by mouth 2 (Two) Times a Day., Disp: , Rfl:   No current facility-administered medications for this visit.    Facility-Administered Medications Ordered in Other Visits:   •  heparin injection 500 Units, 500 Units, Intravenous, PRN, Takimoto, Jelly L, DO, 500 Units at 11/30/22 0932  •  sodium chloride 0.9 % flush 10 mL, 10 mL, Intravenous, PRN, Takimoto, Jelly L, DO, 10 mL at 11/30/22 0931    ALLERGIES:  Allergies   Allergen Reactions   • Beta Adrenergic Blockers Other (See Comments)     Make PAD pain / claudication worse         ROS:  CONSTITUTIONAL:  Denies fever or chills.   NEUROLOGIC:  Denies headache, focal weakness or sensory changes.   EYES:  Denies change in visual acuity.  HEENT:  Denies nasal congestion or sore throat.   RESPIRATORY:  Denies cough or  shortness of breath.   CARDIOVASCULAR:  Denies chest pain or edema.   GI:  Denies abdominal pain, nausea, vomiting, bloody stools or diarrhea.   :  Denies dysuria, leaking or incontinence.  MUSCULOSKELETAL:  Denies back pain or joint pain.   INTEGUMENT:  Denies rash.   ENDOCRINE:  Denies polyuria or polydipsia.   LYMPHATIC:  Denies swollen glands or lymphedema.   PSYCHIATRIC:  Denies depression or anxiety.      PHYSICAL EXAM:  Vitals:    11/30/22 0951   BP: (!) 188/99   Pulse: 91   Resp: 12   Temp: 96.6 °F (35.9 °C)   TempSrc: Temporal   SpO2: 97%   Weight: 65.8 kg (145 lb)   PainSc:   6   PainLoc: Hand  Comment: hands feel tingly and numb     Body mass index is 34.96 kg/m².  Current Status 11/30/2022   ECOG score 1     PHQ-9 Total Score:           GEN: alert and oriented x 3, normal affect, well nourished and hydrated.  CARDIO: regular rate and rhythm.  PULM: Lungs CTA b.l, no RRW   ABD:Soft, nontender, nondistended.   GYN: defer today   EXT: No petechiae, bruising, rash, candida. No CCE.           Result Review :  The pertinent labs, images, and/or pathology as noted in the oncology history were reviewed independently and discussed with the patient.   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   • Uncontrolled DM - A1C 11 -  17U humalog AC, Lantus 68U BID   • Non compliance with diabetes management  • Porcelain gallbladder   • CAD - on ASA and statin   • HTN   • Peripheral vascular disease- post R BKA /  L AKA          PLAN :  Perioperative mgmt: hold ASA     The case was reviewed with the patient in detail, taking into consideration symptoms, laboratory results, imaging, pathology and physical exam findings.  Currently favorable for interval debulking.  At this point in time, I am recommending exploratory laparotomy, total abdominal hysterectomy, bilateral salpingooophorecotmy, possible staging (pelvic and periaortic lymphadenectomy with omentectomy. 28003    Risks, benefits, alternatives and indications to the procedure were reviewed in detail.    Risks of surgery include bleeding/hemorrhage which may require transfusion and associated transfusion risk (transfusion reaction, HCV, TRALI ); Infection, which may require antibiotic therapy or abscess drainage; Damage to surrounding internal organs (bowel, bladder, or urinary tract) which may result in obstruction or fistula; Nerve, or vascular injury which may result in numbness, pain, swelling or loss of strength. Risk of possible incomplete resolution of symptoms or failure to cure.  She understands that it is possible that intraoperative findings may warrant further treatment.  There is a low, but real, risk of unanticipated return to the OR for a problem associated with the surgery and a remote possibility of death.      • All questions were addressed and answered to the patient's satisfaction. She indicates understanding of these risks and desires to proceed. She wishes me to use my judgement to do the procedure that I feel is in her best interest. Surgical consents to be signed in Pre Op prior to surgery.   •     • Friend for life referral - pt has very little social support. Will need to establish who can make medical decision as we go forward with her care.   - Will Cedeno (relative) [phone]  - Sherrie Rivero (relative) [phone]  - Roe Burgess (son) [phone]        CC: Jesi Spence [phone]          Jelly Montalvo D.O  11/30/2022    Gynecologic Oncology   [address] [place] [address]  [phone] office

## 2022-12-01 DIAGNOSIS — K82.8 PORCELAIN GALLBLADDER: Primary | ICD-10-CM

## 2022-12-02 ENCOUNTER — TELEPHONE (OUTPATIENT)
Dept: GYNECOLOGIC ONCOLOGY | Age: 62
End: 2022-12-02

## 2022-12-02 NOTE — TELEPHONE ENCOUNTER
Caller: WINDY - DR. HOLLAND    Relationship: Network    Best call back number: [phone]    What is the best time to reach you: ASAP, REGARDING A PROCEDURE FOR Monday.    Who are you requesting to speak with (clinical staff, provider,  specific staff member): ARCADIO    Do you know the name of the person who called: WINDY    What was the call regarding: TRYING TO GET SOME CLARIFICATION ON A PROCEDURE Monday FOR DR. EVANS.    Do you require a callback:  YES, PLEASE

## 2022-12-07 ENCOUNTER — APPOINTMENT (OUTPATIENT)
Dept: ONCOLOGY | Facility: HOSPITAL | Age: 62
End: 2022-12-07
Payer: MEDICARE

## 2022-12-08 ENCOUNTER — TELEPHONE (OUTPATIENT)
Dept: GYNECOLOGIC ONCOLOGY | Facility: CLINIC | Age: 62
End: 2022-12-08

## 2022-12-14 ENCOUNTER — TELEPHONE (OUTPATIENT)
Dept: GYNECOLOGIC ONCOLOGY | Age: 62
End: 2022-12-14

## 2022-12-14 NOTE — TELEPHONE ENCOUNTER
Caller: Joanne Contreras    Relationship: Self    Best call back number: [phone]    What is the best time to reach you: ANYTIME    Who are you requesting to speak with (clinical staff, provider,  specific staff member): CLINICAL    What was the call regarding: JOANNE IS CALLING WANTING TO KNOW IF A SURGERY DATE HAS BEEN MADE  JOANNE STATES THAT IT NEEDED TO BE DONE IN A CERATIN TIME FRAME       Do you require a callback: YES

## 2022-12-16 ENCOUNTER — TELEPHONE (OUTPATIENT)
Dept: GYNECOLOGIC ONCOLOGY | Facility: CLINIC | Age: 62
End: 2022-12-16

## 2022-12-16 DIAGNOSIS — C57.00 FALLOPIAN TUBE CARCINOMA, UNSPECIFIED LATERALITY: Primary | ICD-10-CM

## 2022-12-16 NOTE — TELEPHONE ENCOUNTER
Spoke with pt and provided the following information, a copy has also been mailed to her. She verbalized understanding.    PAT 12/22 @ 1:30 PM  Surgery 12/29 arriving @ 1:30 PM    Informed her I would fax a request for cardiac clearance to Dr. Vasquez.

## 2022-12-19 ENCOUNTER — TELEPHONE (OUTPATIENT)
Dept: GYNECOLOGIC ONCOLOGY | Age: 62
End: 2022-12-19

## 2022-12-19 NOTE — TELEPHONE ENCOUNTER
Caller: Joanne Contreras    Relationship: Self    Best call back number: [phone]    What is the best time to reach you: ANYTIME    Who are you requesting to speak with (clinical staff, provider,  specific staff member): ARCADIO/    What was the call regarding: PT CALLING BACK ABOUT THE FAX FROM [employer], I GAVE HER THE FAX NUMBER [phone].    PLEASE CALL PT SHE IS CONCERNED AS SHE HAS AN APPT ON 12/22/2022.    TRIED TO W/T NO ANSWER    Do you require a callback: YES

## 2022-12-21 ENCOUNTER — TELEPHONE (OUTPATIENT)
Dept: SURGERY | Facility: CLINIC | Age: 62
End: 2022-12-21

## 2022-12-21 NOTE — TELEPHONE ENCOUNTER
Received paperwork from cardiologist that pt is not cleared for surgery. Both PAT and surgery have been cancelled, also informed Dr. Barlow's office as this was a co-case.    Patient is aware and verbalized understanding. Has a telephone visit with Dr. Montalvo 12/28 to review next steps.

## 2022-12-22 ENCOUNTER — APPOINTMENT (OUTPATIENT)
Dept: PREADMISSION TESTING | Facility: HOSPITAL | Age: 62
End: 2022-12-22

## 2022-12-28 ENCOUNTER — OFFICE VISIT (OUTPATIENT)
Dept: GYNECOLOGIC ONCOLOGY | Facility: CLINIC | Age: 62
End: 2022-12-28

## 2022-12-28 ENCOUNTER — TREATMENT (OUTPATIENT)
Dept: GYNECOLOGIC ONCOLOGY | Facility: CLINIC | Age: 62
End: 2022-12-28

## 2022-12-28 ENCOUNTER — TELEPHONE (OUTPATIENT)
Dept: GYNECOLOGIC ONCOLOGY | Facility: CLINIC | Age: 62
End: 2022-12-28

## 2022-12-28 DIAGNOSIS — C56.9 MALIGNANT NEOPLASM OF OVARY, UNSPECIFIED LATERALITY: Primary | ICD-10-CM

## 2022-12-28 DIAGNOSIS — C57.00 FALLOPIAN TUBE CARCINOMA, UNSPECIFIED LATERALITY: Primary | ICD-10-CM

## 2022-12-28 PROCEDURE — 99442 PR PHYS/QHP TELEPHONE EVALUATION 11-20 MIN: CPT | Performed by: OBSTETRICS & GYNECOLOGY

## 2022-12-28 NOTE — PROGRESS NOTES
*TELEHEALTH VISIT *     Patient consented to telephone visit for medical care today.   Total time spent reviewing images, labs, discussion and plan was 15 minutes.     Jelly Montalvo D.O  2022          Age: 62 y.o.  Sex: female  :  1960  MRN: [id number]     REFERRING PHYSICIAN: No ref. provider found    Joanne Contreras presents to [facility] televisit to review treatment plan going forward. She was deemed unsuitable for surgery due to significant comoribidities and preivous vasculpathy/cardiac disease. She completed 3C NAC in preparatoin for tumor debulking, but she has been deemed high risk with non modifiable risk by her cardiologist Arturo Vasquez, with diffuse triple vessel coronary artery disease , no options for PCI or surgical revascularization.          Oncology/Hematology History Overview Note   • 22: CT AP - Findings suspicious for peritoneal carcinomatosis with ascites and metastatic disease to both ovaries. Recommend diagnostic paracentesis for cytology. Also noted is a porcelain gallbladder and pancreatic atrophy.  • 22: Paracentesis - rare atypical cells suspicious but not diagnostic for carcinoma. Reactive mesothelium & lymphocytes.   • 22: Pelvic US - The bilateral ovaries appear slightly enlarged and heterogeneous but no dominant adnexal masses are seen.  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   • 22: Paracentesis - removed 3400 mL of dark katiana fluid   • 22: CT C/A/P - Interval response to therapy. Significant interval regression of abdominal and pelvic ascites with loculated features and associated peritoneal thickening and abnormal enhancement. Interval decrease in retroperitoneal adenopathy. No new evidence of solid organ metastasis. Dilated porcelain gallbladder with a rind of persistent soft tissue thickening similar to the prior study. No new biliary ductal dilatation. The  ovaries appear less prominent and demonstrate interval decrease in abnormal enhancement and cystic change. Advanced atherosclerotic change. Intermittent and/or absence of flow within the bilateral iliac systems. Evidence of prior vascular surgery.      12/28/22: Discussion of Next Treatment steps        CURRENT TREATMENT:  CYCLE 1: 9/28/22 - Taxol 175 mg/m2  Carbo AUC 6   CYCLE 2:  10/19/22 - Taxol 175 mg/m2  Carbo AUC 6   • ANC: 1.02  CYCLE 3: 11/9/22 - Taxol 175 mg/m2  Carbo AUC 6                Fallopian tube carcinoma, unspecified laterality (HCC)   9/27/2022 Initial Diagnosis    Fallopian tube carcinoma, unspecified laterality (HCC)     9/28/2022 -  Chemotherapy    OP OVARIAN PACLitaxel / CARBOplatin (Q21D)         Past Medical History:  Past Medical History:   Diagnosis Date   • Anemia    • Coronary artery disease    • Diabetes mellitus (HCC)    • Hyperlipidemia    • Hypertension    • Peripheral vascular disease, secondary (HCC)    • Stroke (HCC) 2022       Past Surgical History:  Past Surgical History:   Procedure Laterality Date   • ABOVE KNEE AMPUTATION Left    • BELOW KNEE AMPUTATION Right    • TOE SURGERY     • VENOUS ACCESS DEVICE (PORT) INSERTION Right 9/28/2022    Procedure: INSERTION VENOUS ACCESS DEVICE;  Surgeon: Adama Barlow MD;  Location: [facility];  Service: General;  Laterality: Right;        MEDICATIONS:    Current Outpatient Medications:   •  acetaminophen (TYLENOL) 325 MG tablet, Take 650 mg by mouth Every 6 (Six) Hours As Needed for Mild Pain (1-3)., Disp: , Rfl:   •  aspirin 81 MG chewable tablet, Chew 81 mg Daily., Disp: , Rfl:   •  atorvastatin (LIPITOR) 20 MG tablet, Take 20 mg by mouth Daily., Disp: , Rfl:   •  carvedilol (COREG) 3.125 MG tablet, Take 3.125 mg by mouth 2 (Two) Times a Day With Meals., Disp: , Rfl:   •  Continuous Blood Gluc Sensor (FreeStyle Dori 2 Sensor) misc, , Disp: , Rfl:   •  docusate sodium (COLACE) 100 MG capsule, Take 100 mg by mouth 2 (Two) Times  a Day., Disp: , Rfl:   •  gabapentin (NEURONTIN) 300 MG capsule, Take 1 capsule by mouth 3 (Three) Times a Day., Disp: 90 capsule, Rfl: 3  •  insulin aspart (novoLOG) 100 UNIT/ML injection, Inject 4 Units under the skin 3 (Three) Times a Day Before Meals., Disp: , Rfl:   •  Insulin Glargine (LANTUS SC), Inject 20 Units under the skin 2 (Two) Times a Day., Disp: , Rfl:   •  Insulin Lispro, 1 Unit Dial, (HUMALOG) 100 UNIT/ML solution pen-injector, , Disp: , Rfl:   •  magnesium oxide (MAG-OX) 400 MG tablet, Take 1 tablet by mouth 2 (Two) Times a Day for 90 days., Disp: 60 tablet, Rfl: 2  •  Multiple Vitamins-Minerals (MULTIVITAMIN WITH MINERALS) tablet tablet, Take 1 tablet by mouth Daily., Disp: , Rfl:   •  O2 (OXYGEN), Inhale 2 L/min 1 (One) Time., Disp: , Rfl:   •  OLANZapine (ZyPREXA) 5 MG tablet, Take 1 tablet by mouth Every Night. Take on days 2, 3 and 4 after chemotherapy., Disp: 3 tablet, Rfl: 5  •  ondansetron (ZOFRAN) 4 MG tablet, Take 4 mg by mouth Every 8 (Eight) Hours As Needed for Nausea or Vomiting., Disp: , Rfl:   •  ondansetron (ZOFRAN) 8 MG tablet, Take 1 tablet by mouth 3 (Three) Times a Day As Needed for Nausea or Vomiting., Disp: 30 tablet, Rfl: 5  •  pantoprazole (PROTONIX) 20 MG EC tablet, , Disp: , Rfl:   •  pantoprazole (PROTONIX) 40 MG EC tablet, Take 40 mg by mouth Daily., Disp: , Rfl:   •  saccharomyces boulardii (FLORASTOR) 250 MG capsule, Take 250 mg by mouth 2 (Two) Times a Day., Disp: , Rfl:   •  senna (SENOKOT) 8.6 MG tablet tablet, Take  by mouth Every Night., Disp: , Rfl:     ALLERGIES:  Allergies   Allergen Reactions   • Beta Adrenergic Blockers Other (See Comments)     Make PAD pain / claudication worse         ROS:  CONSTITUTIONAL:  Denies fever or chills.   NEUROLOGIC:  Denies headache, focal weakness or sensory changes.   EYES:  Denies change in visual acuity.  HEENT:  Denies nasal congestion or sore throat.   RESPIRATORY:  Denies cough or shortness of breath.   CARDIOVASCULAR:   Denies chest pain or edema.   GI:  Denies abdominal pain, nausea, vomiting, bloody stools or diarrhea.   :  Denies dysuria, leaking or incontinence.  MUSCULOSKELETAL:  Denies back pain or joint pain.   INTEGUMENT:  Denies rash.   ENDOCRINE:  Denies polyuria or polydipsia.   LYMPHATIC:  Denies swollen glands or lymphedema.   PSYCHIATRIC:  Denies depression or anxiety.      PHYSICAL EXAM:  There were no vitals filed for this visit.  There is no height or weight on file to calculate BMI.  Current Status 11/30/2022   ECOG score 1     PHQ-9 Total Score:               Result Review :  The pertinent labs, images, and/or pathology as noted in the oncology history were reviewed independently and discussed with the patient.   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   • Uncontrolled DM - A1C 11 -  17U humalog AC, Lantus 68U BID   • Non compliance with diabetes management  • Porcelain gallbladder   • CAD - on ASA and statin   • HTN   • Peripheral vascular disease- post R BKA /  L AKA    • Failed cardiac clearance - high risk with non modifiable factors, diffuse triple vessel coronary artery disease , no options for PCI or surgical revascularization.          PLAN :  • I had a melanie discussion with the patient that she will not be able to have surgery and the mainstay of treatment for her will be chemotherapy only. I discsused that I believe she will be on some form of chemo for the rest of her life and that we can attempt to gain her some time but ultimately she will likely succumb to this disease. I asked who will be her support system and she relays that she has no support or family who can take care of her. I have asked that  she be seen by navigator and  (initial request 10/19/22) . Initial end of life discussion.  • Place back on chemo schedule for next infusion.     • Navigator/  and friend for life referral - pt has very little social support. Will need to establish who can make medical decision as we go forward with her care.   - Will Wilian (relative) [phone]  - Sherrie Rivero (relative) [phone]  - Roe Burgess (son) [phone]        CC: Jesi Spence [phone]          Jelly Montalvo D.O  12/28/2022    Gynecologic Oncology   [address] [place] [address]  [phone] office

## 2022-12-28 NOTE — TELEPHONE ENCOUNTER
Called patient, but there was no answer.Left a message for patient to call back office about upcoming appointments.

## 2022-12-29 ENCOUNTER — PATIENT OUTREACH (OUTPATIENT)
Dept: OTHER | Facility: HOSPITAL | Age: 62
End: 2022-12-29

## 2023-01-09 ENCOUNTER — TELEPHONE (OUTPATIENT)
Dept: GYNECOLOGIC ONCOLOGY | Facility: CLINIC | Age: 63
End: 2023-01-09
Payer: MEDICARE

## 2023-01-09 NOTE — TELEPHONE ENCOUNTER
DELETE AFTER REVIEWING: Telephone encounter to be sent to the clinical pool     Caller: Joanne Contreras    Relationship to patient: Self    Best call back number: [phone]    Chief complaint: PT RIDES THE BUS AND WANTED TO SEE IF THERE WAS A LATER TIME SHE COULD COME IN ON Friday 1-13-23     Type of visit: PORT FLUSH, FOLLOW UP, AND INFUSION     Requested date: 1-13-23 LATER TIME     If rescheduling, when is the original appointment: 1-13-23     Additional notes:PLEASE CALL PATIENT TO CONFIRM

## 2023-01-09 NOTE — TELEPHONE ENCOUNTER
Spoke with patient, unable to move appointments due to length of infusion and no spots available. States she will speak to her transportation. Told her to call the office back if she had questions or concerns.

## 2023-01-13 ENCOUNTER — INFUSION (OUTPATIENT)
Dept: ONCOLOGY | Facility: HOSPITAL | Age: 63
End: 2023-01-13
Payer: MEDICARE

## 2023-01-13 ENCOUNTER — CLINICAL SUPPORT (OUTPATIENT)
Dept: OTHER | Facility: HOSPITAL | Age: 63
End: 2023-01-13
Payer: MEDICARE

## 2023-01-13 ENCOUNTER — OFFICE VISIT (OUTPATIENT)
Dept: GYNECOLOGIC ONCOLOGY | Facility: CLINIC | Age: 63
End: 2023-01-13
Payer: MEDICARE

## 2023-01-13 VITALS — HEART RATE: 75 BPM | SYSTOLIC BLOOD PRESSURE: 163 MMHG | DIASTOLIC BLOOD PRESSURE: 79 MMHG

## 2023-01-13 VITALS
TEMPERATURE: 97 F | DIASTOLIC BLOOD PRESSURE: 84 MMHG | RESPIRATION RATE: 15 BRPM | OXYGEN SATURATION: 94 % | SYSTOLIC BLOOD PRESSURE: 191 MMHG | HEART RATE: 72 BPM

## 2023-01-13 DIAGNOSIS — C57.00 FALLOPIAN TUBE CARCINOMA, UNSPECIFIED LATERALITY: Primary | ICD-10-CM

## 2023-01-13 DIAGNOSIS — Z45.9 ENCOUNTER FOR MANAGEMENT OF IMPLANTED DEVICE: Primary | ICD-10-CM

## 2023-01-13 DIAGNOSIS — C57.00 FALLOPIAN TUBE CARCINOMA, UNSPECIFIED LATERALITY: ICD-10-CM

## 2023-01-13 LAB
ALBUMIN SERPL-MCNC: 3.8 G/DL (ref 3.5–5.2)
ALBUMIN/GLOB SERPL: 1.2 G/DL (ref 1.1–2.4)
ALP SERPL-CCNC: 78 U/L (ref 38–116)
ALT SERPL W P-5'-P-CCNC: 14 U/L (ref 0–33)
ANION GAP SERPL CALCULATED.3IONS-SCNC: 11.3 MMOL/L (ref 5–15)
AST SERPL-CCNC: 21 U/L (ref 0–32)
BASOPHILS # BLD AUTO: 0.05 10*3/MM3 (ref 0–0.2)
BASOPHILS NFR BLD AUTO: 0.6 % (ref 0–1.5)
BILIRUB SERPL-MCNC: 0.3 MG/DL (ref 0.2–1.2)
BUN SERPL-MCNC: 12 MG/DL (ref 6–20)
BUN/CREAT SERPL: 18.2 (ref 7.3–30)
CALCIUM SPEC-SCNC: 10.5 MG/DL (ref 8.5–10.2)
CANCER AG125 SERPL QL: 43.9 U/ML (ref 0–38.1)
CHLORIDE SERPL-SCNC: 104 MMOL/L (ref 98–107)
CO2 SERPL-SCNC: 26.7 MMOL/L (ref 22–29)
CREAT SERPL-MCNC: 0.66 MG/DL (ref 0.6–1.1)
DEPRECATED RDW RBC AUTO: 67.9 FL (ref 37–54)
EGFRCR SERPLBLD CKD-EPI 2021: 99.3 ML/MIN/1.73
EOSINOPHIL # BLD AUTO: 0.2 10*3/MM3 (ref 0–0.4)
EOSINOPHIL NFR BLD AUTO: 2.3 % (ref 0.3–6.2)
ERYTHROCYTE [DISTWIDTH] IN BLOOD BY AUTOMATED COUNT: 17.6 % (ref 12.3–15.4)
GLOBULIN UR ELPH-MCNC: 3.3 GM/DL (ref 1.8–3.5)
GLUCOSE SERPL-MCNC: 108 MG/DL (ref 74–124)
HCT VFR BLD AUTO: 44.6 % (ref 34–46.6)
HGB BLD-MCNC: 14 G/DL (ref 12–15.9)
IMM GRANULOCYTES # BLD AUTO: 0.02 10*3/MM3 (ref 0–0.05)
IMM GRANULOCYTES NFR BLD AUTO: 0.2 % (ref 0–0.5)
LYMPHOCYTES # BLD AUTO: 2.2 10*3/MM3 (ref 0.7–3.1)
LYMPHOCYTES NFR BLD AUTO: 25.6 % (ref 19.6–45.3)
MCH RBC QN AUTO: 32.2 PG (ref 26.6–33)
MCHC RBC AUTO-ENTMCNC: 31.4 G/DL (ref 31.5–35.7)
MCV RBC AUTO: 102.5 FL (ref 79–97)
MONOCYTES # BLD AUTO: 0.72 10*3/MM3 (ref 0.1–0.9)
MONOCYTES NFR BLD AUTO: 8.4 % (ref 5–12)
NEUTROPHILS NFR BLD AUTO: 5.4 10*3/MM3 (ref 1.7–7)
NEUTROPHILS NFR BLD AUTO: 62.9 % (ref 42.7–76)
NRBC BLD AUTO-RTO: 0 /100 WBC (ref 0–0.2)
PLATELET # BLD AUTO: 181 10*3/MM3 (ref 140–450)
PMV BLD AUTO: 10.5 FL (ref 6–12)
POTASSIUM SERPL-SCNC: 4 MMOL/L (ref 3.5–4.7)
PROT SERPL-MCNC: 7.1 G/DL (ref 6.3–8)
RBC # BLD AUTO: 4.35 10*6/MM3 (ref 3.77–5.28)
SODIUM SERPL-SCNC: 142 MMOL/L (ref 134–145)
WBC NRBC COR # BLD: 8.59 10*3/MM3 (ref 3.4–10.8)

## 2023-01-13 PROCEDURE — 86304 IMMUNOASSAY TUMOR CA 125: CPT | Performed by: OBSTETRICS & GYNECOLOGY

## 2023-01-13 PROCEDURE — 25010000002 PACLITAXEL PER 1 MG: Performed by: OBSTETRICS & GYNECOLOGY

## 2023-01-13 PROCEDURE — 25010000002 DIPHENHYDRAMINE PER 50 MG: Performed by: OBSTETRICS & GYNECOLOGY

## 2023-01-13 PROCEDURE — 25010000002 HEPARIN LOCK FLUSH PER 10 UNITS: Performed by: OBSTETRICS & GYNECOLOGY

## 2023-01-13 PROCEDURE — 96417 CHEMO IV INFUS EACH ADDL SEQ: CPT

## 2023-01-13 PROCEDURE — 96375 TX/PRO/DX INJ NEW DRUG ADDON: CPT

## 2023-01-13 PROCEDURE — 25010000002 PALONOSETRON PER 25 MCG: Performed by: OBSTETRICS & GYNECOLOGY

## 2023-01-13 PROCEDURE — 80053 COMPREHEN METABOLIC PANEL: CPT

## 2023-01-13 PROCEDURE — 96415 CHEMO IV INFUSION ADDL HR: CPT

## 2023-01-13 PROCEDURE — 96367 TX/PROPH/DG ADDL SEQ IV INF: CPT

## 2023-01-13 PROCEDURE — 96413 CHEMO IV INFUSION 1 HR: CPT

## 2023-01-13 PROCEDURE — 25010000002 FOSAPREPITANT PER 1 MG: Performed by: OBSTETRICS & GYNECOLOGY

## 2023-01-13 PROCEDURE — 99215 OFFICE O/P EST HI 40 MIN: CPT | Performed by: OBSTETRICS & GYNECOLOGY

## 2023-01-13 PROCEDURE — 25010000002 CARBOPLATIN PER 50 MG: Performed by: OBSTETRICS & GYNECOLOGY

## 2023-01-13 PROCEDURE — 85025 COMPLETE CBC W/AUTO DIFF WBC: CPT

## 2023-01-13 PROCEDURE — 25010000002 DEXAMETHASONE PER 1 MG: Performed by: OBSTETRICS & GYNECOLOGY

## 2023-01-13 RX ORDER — FAMOTIDINE 10 MG/ML
20 INJECTION, SOLUTION INTRAVENOUS ONCE
Status: CANCELLED | OUTPATIENT
Start: 2023-01-13

## 2023-01-13 RX ORDER — SODIUM CHLORIDE 0.9 % (FLUSH) 0.9 %
10 SYRINGE (ML) INJECTION AS NEEDED
Status: DISCONTINUED | OUTPATIENT
Start: 2023-01-13 | End: 2023-01-13 | Stop reason: HOSPADM

## 2023-01-13 RX ORDER — SODIUM CHLORIDE 9 MG/ML
250 INJECTION, SOLUTION INTRAVENOUS ONCE
Status: CANCELLED | OUTPATIENT
Start: 2023-01-13

## 2023-01-13 RX ORDER — FAMOTIDINE 10 MG/ML
20 INJECTION, SOLUTION INTRAVENOUS ONCE
Status: COMPLETED | OUTPATIENT
Start: 2023-01-13 | End: 2023-01-13

## 2023-01-13 RX ORDER — PALONOSETRON 0.05 MG/ML
0.25 INJECTION, SOLUTION INTRAVENOUS ONCE
Status: CANCELLED | OUTPATIENT
Start: 2023-01-13

## 2023-01-13 RX ORDER — HEPARIN SODIUM (PORCINE) LOCK FLUSH IV SOLN 100 UNIT/ML 100 UNIT/ML
500 SOLUTION INTRAVENOUS AS NEEDED
Status: DISCONTINUED | OUTPATIENT
Start: 2023-01-13 | End: 2023-01-13 | Stop reason: HOSPADM

## 2023-01-13 RX ORDER — OLANZAPINE 5 MG/1
5 TABLET ORAL ONCE
Status: CANCELLED | OUTPATIENT
Start: 2023-01-13 | End: 2023-01-13

## 2023-01-13 RX ORDER — DIPHENHYDRAMINE HYDROCHLORIDE 50 MG/ML
50 INJECTION INTRAMUSCULAR; INTRAVENOUS AS NEEDED
Status: CANCELLED | OUTPATIENT
Start: 2023-01-13

## 2023-01-13 RX ORDER — FAMOTIDINE 10 MG/ML
20 INJECTION, SOLUTION INTRAVENOUS AS NEEDED
Status: CANCELLED | OUTPATIENT
Start: 2023-01-13

## 2023-01-13 RX ORDER — PALONOSETRON 0.05 MG/ML
0.25 INJECTION, SOLUTION INTRAVENOUS ONCE
Status: COMPLETED | OUTPATIENT
Start: 2023-01-13 | End: 2023-01-13

## 2023-01-13 RX ORDER — SODIUM CHLORIDE 9 MG/ML
250 INJECTION, SOLUTION INTRAVENOUS ONCE
Status: COMPLETED | OUTPATIENT
Start: 2023-01-13 | End: 2023-01-13

## 2023-01-13 RX ORDER — OLANZAPINE 5 MG/1
5 TABLET ORAL ONCE
Status: COMPLETED | OUTPATIENT
Start: 2023-01-13 | End: 2023-01-13

## 2023-01-13 RX ORDER — SODIUM CHLORIDE 0.9 % (FLUSH) 0.9 %
10 SYRINGE (ML) INJECTION AS NEEDED
Status: CANCELLED | OUTPATIENT
Start: 2023-01-13

## 2023-01-13 RX ORDER — HEPARIN SODIUM (PORCINE) LOCK FLUSH IV SOLN 100 UNIT/ML 100 UNIT/ML
500 SOLUTION INTRAVENOUS AS NEEDED
Status: CANCELLED | OUTPATIENT
Start: 2023-01-13

## 2023-01-13 RX ADMIN — DIPHENHYDRAMINE HYDROCHLORIDE 50 MG: 50 INJECTION, SOLUTION INTRAMUSCULAR; INTRAVENOUS at 10:01

## 2023-01-13 RX ADMIN — Medication 10 ML: at 15:26

## 2023-01-13 RX ADMIN — SODIUM CHLORIDE 250 ML: 9 INJECTION, SOLUTION INTRAVENOUS at 09:56

## 2023-01-13 RX ADMIN — CARBOPLATIN 550 MG: 10 INJECTION, SOLUTION INTRAVENOUS at 14:20

## 2023-01-13 RX ADMIN — OLANZAPINE 5 MG: 5 TABLET, FILM COATED ORAL at 10:12

## 2023-01-13 RX ADMIN — PALONOSETRON 0.25 MG: 0.05 INJECTION, SOLUTION INTRAVENOUS at 09:59

## 2023-01-13 RX ADMIN — DEXAMETHASONE SODIUM PHOSPHATE 20 MG: 10 INJECTION INTRAMUSCULAR; INTRAVENOUS at 10:19

## 2023-01-13 RX ADMIN — SODIUM CHLORIDE 100 ML: 9 INJECTION, SOLUTION INTRAVENOUS at 10:41

## 2023-01-13 RX ADMIN — FAMOTIDINE 20 MG: 10 INJECTION INTRAVENOUS at 09:56

## 2023-01-13 RX ADMIN — Medication 500 UNITS: at 15:26

## 2023-01-13 RX ADMIN — PACLITAXEL 290 MG: 6 INJECTION, SOLUTION INTRAVENOUS at 11:21

## 2023-01-13 NOTE — PROGRESS NOTES
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      Type of Cancer Treatment  cycle 4 carboplatin taxol     CLINICAL NUTRITION ASSESSMENT      Reason for Assessment  Assessment, Physician consult     H&P:    Past Medical History:   Diagnosis Date   • Anemia    • Coronary artery disease    • Diabetes mellitus (HCC)    • Hyperlipidemia    • Hypertension    • Peripheral vascular disease, secondary (HCC)    • Stroke (HCC) 2022        Current Problems:   Patient Active Problem List   Diagnosis Code   • Porcelain gallbladder K82.8   • Ascites R18.8   • Diabetes mellitus (HCC) E11.9   • Hyperlipidemia E78.5   • Coronary artery disease I25.10   • Hypertension I10   • Peripheral vascular disease, secondary (HCC) I79.8   • Anemia D64.9   • Elevated cancer antigen 125 (CA-125) R97.1   • Fallopian tube carcinoma, unspecified laterality (HCC) C57.00   • Encounter for management of implanted device Z45.9   • Ovarian cancer (HCC) C56.9            BMI kg/m2   There is no height or weight on file to calculate BMI.    Weight Hx  Wt Readings from Last 30 Encounters:   11/30/22 0951 65.8 kg (145 lb)   11/30/22 0845 66.1 kg (145 lb 11.2 oz)   11/09/22 0855 77.1 kg (169 lb 14.4 oz)   10/19/22 0948 76.7 kg (169 lb)   09/28/22 1400 76.9 kg (169 lb 8.5 oz)   09/28/22 1328 76.9 kg (169 lb 8.5 oz)   09/19/22 1751 82.2 kg (181 lb 3.5 oz)   09/19/22 0241 86.2 kg (190 lb)   08/13/22 2135 76.7 kg (169 lb)   04/29/17 1851 73.9 kg (163 lb)             Labs/Medications        Pertinent Labs Reviewed.   Results from last 7 days   Lab Units 01/13/23  0840   SODIUM mmol/L 142   POTASSIUM mmol/L 4.0   CHLORIDE mmol/L 104   CO2 mmol/L 26.7   BUN mg/dL 12   CREATININE mg/dL 0.66   CALCIUM mg/dL 10.5*   BILIRUBIN mg/dL 0.3   ALK PHOS U/L 78   ALT (SGPT) U/L 14    AST (SGOT) U/L 21   GLUCOSE mg/dL 108     Results from last 7 days   Lab Units 01/13/23  0840   HEMOGLOBIN g/dL 14.0   HEMATOCRIT % 44.6     COVID19   Date Value Ref Range Status   08/13/2022 Detected (C) Not Detected - Ref. Range Final     Lab Results   Component Value Date    HGBA1C 8.40 (H) 09/19/2022         Pertinent Medications FreeStyle Dori 2 Sensor, Insulin Glargine, Insulin Lispro (1 Unit Dial), O2, OLANZapine, acetaminophen, aspirin, atorvastatin, carvedilol, docusate sodium, gabapentin, insulin aspart, magnesium oxide, multivitamin with minerals, ondansetron, pantoprazole, saccharomyces boulardii, and senna     Physical Findings    R BKA, L AKA        Estimated/Assessed Needs       Energy Requirements    Height for Calculation   n/a   Weight for Calculation 145 lb ( 65.8 kg)    Method for Estimation  25 kcal/kg, 30 kcal/kg   EST Needs (kcal/day) 4288-7909 kcal       Protein Requirements    Weight for Calculation 78 g    EST Protein Needs (g/kg) 1.2 gm/kg   EST Daily Needs (g/day)        Fluid Requirements     Method for Estimation 30 mL/kg    Estimated Needs (mL/day) 1950 mL         Current Nutrition Orders & Evaluation of Intake       Oral Nutrition     Current PO Diet Per infusion nurse, the patient lives alone, is wheelchair bound due to bilateral amputations, and is able to go to the local food store to buy some groceries.   Unsure of patient's ability to cook for herself. Has limited support.   Supplement          Nutrition Diagnosis        Nutrition Dx Problem 1 Predicted suboptimal intake related to social/financial factors, noncompliance as evidenced by decreased po intake       Nutrition Intervention       RD Action Will follow up with patient in infusion     Monitor/Evaluation       Monitor Per oncology nutrition protocol.     Comments: Referral from gyn onc.  Patient in infusion today but unable to visit as patient was asleep, and unable to awaken her. Per the infusion nurse the patient has  limited support at home, is wheelchair bound due to bilateral amputations, has financial and food insecurity concerns, and it is unclear if the patient is able to cook for herself or has the means to cook for herself in her current living situation.   The patient has been non-compliant with meds and diabetes management per MD notes.   A referral to social work has been placed.  Will attempt to see patient next visit.     Electronically signed by:  Britni Kerr RD, CASH  01/13/23 13:53 EST

## 2023-01-13 NOTE — PROGRESS NOTES
"        Age: 63 y.o.  Sex: female  :  1960  MRN: [id number]     REFERRING PHYSICIAN: No ref. 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 We had planned for interval cytoreduction, however cardiology deemed her unacceptable high risk due to diffuse triple vessel coronary artery disease with no options for PCI or surgical revascularization.   She continues to exhibit signs of non compliance and this is greatly affecting her care. She has little to no surpport and alhtough our  has left voicemails, she has not taken the initiative to return the calls. She states she is having a hard time \"wrapping her head\" around her diagnosis, why she cannot have surgery etc. I explained that today her BP is high, she has not taken her meds and states \"my blood pressure isnt high to me\" but I described there is a standard definition of this. Overall exhibits difficulty with the reality of her illness.         Oncology/Hematology History Overview Note     23: C4 Carbo/Taxol        CURRENT TREATMENT:  CYCLE 1: 22 - Taxol 175 mg/m2  Carbo AUC 6   CYCLE 2:  10/19/22 - Taxol 175 mg/m2  Carbo AUC 6   • ANC: 1.02  CYCLE 3: 22 - Taxol 175 mg/m2  Carbo AUC 6                Fallopian tube carcinoma, unspecified laterality (HCC)   2022 Imaging    CT A/P - Findings suspicious for peritoneal carcinomatosis with ascites and metastatic disease to both ovaries. Recommend diagnostic paracentesis for cytology. Also noted is a porcelain gallbladder and pancreatic atrophy.     2022 Procedure    Paracentesis - rare atypical cells suspicious but not diagnostic for carcinoma. Reactive mesothelium & lymphocytes.      2022 Imaging    Pelvic US - The bilateral ovaries appear slightly enlarged and heterogeneous but no dominant adnexal masses are seen.     2022 " Procedure    CT Guided Omental Biopsy - + metastatic high grade carcinoma consistent with mullerian origin.      9/26/2022 Procedure    Paracentesis - removed 3400 mL of dark katiana fluid      9/27/2022 Initial Diagnosis    Fallopian tube carcinoma, unspecified laterality (HCC)     9/28/2022 -  Chemotherapy    OP OVARIAN PACLitaxel / CARBOplatin (Q21D)     11/28/2022 Imaging    CT C/A/P - Interval response to therapy. Significant interval regression of abdominal and pelvic ascites with loculated features and associated peritoneal thickening and abnormal enhancement. Interval decrease in retroperitoneal adenopathy. No new evidence of solid organ metastasis. Dilated porcelain gallbladder with a rind of persistent soft tissue thickening similar to the prior study. No new biliary ductal dilatation. The ovaries appear less prominent and demonstrate interval decrease in abnormal enhancement and cystic change. Advanced atherosclerotic change. Intermittent and/or absence of flow within the bilateral iliac systems. Evidence of prior vascular surgery.         Past Medical History:  Past Medical History:   Diagnosis Date   • Anemia    • Coronary artery disease    • Diabetes mellitus (HCC)    • Hyperlipidemia    • Hypertension    • Peripheral vascular disease, secondary (HCC)    • Stroke (HCC) 2022       Past Surgical History:  Past Surgical History:   Procedure Laterality Date   • ABOVE KNEE AMPUTATION Left    • BELOW KNEE AMPUTATION Right    • TOE SURGERY     • VENOUS ACCESS DEVICE (PORT) INSERTION Right 9/28/2022    Procedure: INSERTION VENOUS ACCESS DEVICE;  Surgeon: Adama Barlow MD;  Location: [facility];  Service: General;  Laterality: Right;        MEDICATIONS:    Current Outpatient Medications:   •  acetaminophen (TYLENOL) 325 MG tablet, Take 650 mg by mouth Every 6 (Six) Hours As Needed for Mild Pain (1-3)., Disp: , Rfl:   •  aspirin 81 MG chewable tablet, Chew 81 mg Daily., Disp: , Rfl:   •  atorvastatin  (LIPITOR) 20 MG tablet, Take 20 mg by mouth Daily., Disp: , Rfl:   •  carvedilol (COREG) 3.125 MG tablet, Take 3.125 mg by mouth 2 (Two) Times a Day With Meals., Disp: , Rfl:   •  Continuous Blood Gluc Sensor (FreeStyle Dori 2 Sensor) misc, , Disp: , Rfl:   •  docusate sodium (COLACE) 100 MG capsule, Take 100 mg by mouth 2 (Two) Times a Day., Disp: , Rfl:   •  gabapentin (NEURONTIN) 300 MG capsule, Take 1 capsule by mouth 3 (Three) Times a Day., Disp: 90 capsule, Rfl: 3  •  insulin aspart (novoLOG) 100 UNIT/ML injection, Inject 4 Units under the skin 3 (Three) Times a Day Before Meals., Disp: , Rfl:   •  Insulin Glargine (LANTUS SC), Inject 20 Units under the skin 2 (Two) Times a Day., Disp: , Rfl:   •  Insulin Lispro, 1 Unit Dial, (HUMALOG) 100 UNIT/ML solution pen-injector, , Disp: , Rfl:   •  magnesium oxide (MAG-OX) 400 MG tablet, Take 1 tablet by mouth 2 (Two) Times a Day for 90 days., Disp: 60 tablet, Rfl: 2  •  Multiple Vitamins-Minerals (MULTIVITAMIN WITH MINERALS) tablet tablet, Take 1 tablet by mouth Daily., Disp: , Rfl:   •  O2 (OXYGEN), Inhale 2 L/min 1 (One) Time., Disp: , Rfl:   •  OLANZapine (ZyPREXA) 5 MG tablet, Take 1 tablet by mouth Every Night. Take on days 2, 3 and 4 after chemotherapy., Disp: 3 tablet, Rfl: 5  •  ondansetron (ZOFRAN) 4 MG tablet, Take 4 mg by mouth Every 8 (Eight) Hours As Needed for Nausea or Vomiting., Disp: , Rfl:   •  ondansetron (ZOFRAN) 8 MG tablet, Take 1 tablet by mouth 3 (Three) Times a Day As Needed for Nausea or Vomiting., Disp: 30 tablet, Rfl: 5  •  pantoprazole (PROTONIX) 20 MG EC tablet, , Disp: , Rfl:   •  pantoprazole (PROTONIX) 40 MG EC tablet, Take 40 mg by mouth Daily., Disp: , Rfl:   •  saccharomyces boulardii (FLORASTOR) 250 MG capsule, Take 250 mg by mouth 2 (Two) Times a Day., Disp: , Rfl:   •  senna (SENOKOT) 8.6 MG tablet tablet, Take  by mouth Every Night., Disp: , Rfl:     ALLERGIES:  Allergies   Allergen Reactions   • Beta Adrenergic Blockers Other  (See Comments)     Make PAD pain / claudication worse         ROS:  CONSTITUTIONAL:  Denies fever or chills.   NEUROLOGIC:  Denies headache, focal weakness or sensory changes.   EYES:  Denies change in visual acuity.  HEENT:  Denies nasal congestion or sore throat.   RESPIRATORY:  Denies cough or shortness of breath.   CARDIOVASCULAR:  Denies chest pain or edema.   GI:  Denies abdominal pain, nausea, vomiting, bloody stools or diarrhea.   :  Denies dysuria, leaking or incontinence.  MUSCULOSKELETAL:  Denies back pain or joint pain.   INTEGUMENT:  Denies rash.   ENDOCRINE:  Denies polyuria or polydipsia.   LYMPHATIC:  Denies swollen glands or lymphedema.   PSYCHIATRIC:  Denies depression or anxiety.      PHYSICAL EXAM:  Vitals:    01/13/23 0849   BP: (!) 191/84   Pulse: 72   Resp: 15   Temp: 97 °F (36.1 °C)   SpO2: 94%   PainSc: 0-No pain     There is no height or weight on file to calculate BMI.  Current Status 1/13/2023   ECOG score 0     PHQ-9 Total Score:         GEN: alert and oriented x 3, normal affect, well nourished and hydrated.  CARDIO: regular rate and rhythm.  PULM: Lungs CTA b.l, no RRW   ABD:Soft, nontender, nondistended.   GYN: defer today   EXT: No petechiae, bruising, rash, candida. No CCE.     Result Review :  The pertinent labs, images, and/or pathology as noted in the oncology history were reviewed independently and discussed with the patient.   Jelly Montalvo,    11/09/2022    [facility] LABS:   Reviewed - glucose actually normal today  - usually 300s    [facility] IMAGING:  COMPARISON:   9/19/2022     FINDINGS:  Abdomen: CT chest dictated separately. Resolution of bilateral effusions. There is advanced atherosclerotic change of the aorta. No aneurysm. Advanced atherosclerotic change of the iliac systems bilaterally. The spleen and adrenal glands are negative and  the pancreas is unremarkable. The kidneys are nonobstructed. There is a left renal cyst. There is hepatic steatosis. No new hepatic lesion.  The gallbladder is dilated up to 5 cm transverse. There is circumferential calcification of the gallbladder wall  in keeping with a porcelain gallbladder.  When compared with the prior study, significant interval reduction in overall volume of ascites in the abdomen and pelvis. Soft tissue thickening associated with the inferior margin of the gallbladder remains present, measuring up to 1.5 cm, not  significantly changed. Trace amount of partially loculated ascites adjacent to the inferior margin of the right hepatic lobe is present. Marginal enhancement in this area has significantly decreased. Shotty retroperitoneal adenopathy remains present. An  enlarged lymph node cephalad to the left renal vein on the prior study is smaller and barely discernible, measuring about 5 mm short axis, previously 1.3 cm. Peritoneal thickening and omental disease in the upper abdomen best demonstrated to the left of  midline is less conspicuous. Abnormal enhancement and peritoneal thickening to the left of midline extending into the small bowel mesentery has also significantly regressed.  Pelvis: The bladder is negative. Trace free fluid in the pelvis. There is no bowel obstruction or focal inflammatory change of the bowel. Appendix not clearly identified but no secondary sign of appendicitis. Abnormal enhancement and cystic change of the  ovaries bilaterally has markedly regressed. There is an iliac stent on the left that appears to be occluded. There is intermittent flow within the external iliac artery on the right. By the level of the common femoral artery, no demonstrable flow on CT.  These findings are unchanged. No new suspicious bone lesion. Degenerative changes.  negative.     IMPRESSION:  1. Interval response to therapy. Significant interval regression of abdominal and pelvic ascites with loculated features and associated peritoneal thickening and abnormal enhancement. Interval decrease in retroperitoneal adenopathy.  No new evidence of  solid organ metastasis.  2. Dilated porcelain gallbladder with a rind of persistent soft tissue thickening similar to the prior study. No new biliary ductal dilatation.  3. The ovaries appear less prominent and demonstrate interval decrease in abnormal enhancement and cystic change.  4. Advanced atherosclerotic change. Intermittent and/or absence of flow within the bilateral iliac systems. Evidence of prior vascular surgery.           ASSESSMENT :  • HG mullerian carcinoma - dx via omental bx 9/23/22  • Paracentesis 3400mL  9/26/22  •   = 6024 at diangosis, most recent 70.2 on 11/30/22  • Carboplatin AUC 6 (550mg capped) /  Taxol 175mg/m2 x 3C   • CT with improvement in lesion size, resolution of ascites.   • Deemed unacceptable surgical risk due to diffuse triple vessel disease and no opitons for PCI or surgical revascularization. Dr Arturo Vasquez is her cardiologist   • Uncontrolled DM - A1C 11 -  17U humalog AC, Lantus 68U BID   • Non compliance with diabetes management  • Porcelain gallbladder   • CAD - on ASA and statin   • HTN   • Peripheral vascular disease- post R BKA /  L AKA    • Failed cardiac clearance - high risk with non modifiable factors, diffuse triple vessel coronary artery disease , no options for PCI or surgical revascularization.   • Noncomplaince affecting her care   • Difficulty coping          PLAN :  • I again today had a melanie discussion with the patient that she will not be able to have surgery and the mainstay of treatment for her will be chemotherapy only. I discussed that she has a terminal disease, and we need to get palliative care involved soon so they can follow along in her care. She has little to no support system, we really need assistance form navigators/ social workers.  • Second end of life discussion today.   • Plan to continue with chemotherapy for now as long as she shows continued response.   •     • Navigator/  and friend for life  referral - pt has very little social support. Will need to establish who can make medical decision as we go forward with her care.( We still have made no progress in this endeavor )  - Will Cedeno (relative) [phone]  - Sherrie Rivero (relative) [phone]  - Roe Burgess (son) [phone]        CC: Jesi Spence [phone]          Jelly Montalvo D.O  1/13/2023    Gynecologic Oncology   [address] [place] [address]  [phone] office

## 2023-01-13 NOTE — NURSING NOTE
Patient states that she feels hopeless after talking to Dr. Montalvo today. She states that Dr. Montalvo said the cardiologist said that she is not a candidate for surgery. Explained why cardiac clearance is necessary, but she  seems to have difficulty understanding this. She also talked about her living and food situation. She goes to [employer] in her motorized scooter to get food. Allowed patient opportunity to express feelings and emotional support provided. ,Ade, notified aand is available to see her this afternoon after treatment is complete, but patient does not wish to do this. Ade states that she will call her next week.  Calcium 10.5  Patient states that she eats a lot of cheese. Encouraged to increase fluid intake and decrease intake of cheese. Dr. Montalvo notified of calcium level. No new orders.

## 2023-01-18 ENCOUNTER — TELEPHONE (OUTPATIENT)
Dept: GYNECOLOGIC ONCOLOGY | Facility: CLINIC | Age: 63
End: 2023-01-18
Payer: MEDICARE

## 2023-01-18 NOTE — TELEPHONE ENCOUNTER
Caller: Joanne Contreras    Relationship: Self    Best call back number: [phone]    What is the best time to reach you: ANYTIME    Who are you requesting to speak with (clinical staff, provider,  specific staff member): SCHEDULING    What was the call regarding: PT WOULD LIKE TO R/S HER 1/20 APPTS. PLEASE CALL TO R/S.     Do you require a callback: YES

## 2023-01-20 ENCOUNTER — APPOINTMENT (OUTPATIENT)
Dept: LAB | Facility: HOSPITAL | Age: 63
End: 2023-01-20
Payer: MEDICARE

## 2023-01-20 DIAGNOSIS — C57.00 FALLOPIAN TUBE CARCINOMA, UNSPECIFIED LATERALITY: Primary | ICD-10-CM

## 2023-01-23 ENCOUNTER — OFFICE VISIT (OUTPATIENT)
Dept: GYNECOLOGIC ONCOLOGY | Facility: CLINIC | Age: 63
End: 2023-01-23
Payer: MEDICARE

## 2023-01-23 ENCOUNTER — LAB (OUTPATIENT)
Dept: LAB | Facility: HOSPITAL | Age: 63
End: 2023-01-23
Payer: MEDICARE

## 2023-01-23 VITALS
DIASTOLIC BLOOD PRESSURE: 83 MMHG | RESPIRATION RATE: 12 BRPM | SYSTOLIC BLOOD PRESSURE: 186 MMHG | TEMPERATURE: 97.7 F | HEART RATE: 76 BPM

## 2023-01-23 DIAGNOSIS — C56.9 MALIGNANT NEOPLASM OF OVARY, UNSPECIFIED LATERALITY: Primary | ICD-10-CM

## 2023-01-23 DIAGNOSIS — C57.00 FALLOPIAN TUBE CARCINOMA, UNSPECIFIED LATERALITY: ICD-10-CM

## 2023-01-23 LAB
ALBUMIN SERPL-MCNC: 3.7 G/DL (ref 3.5–5.2)
ALBUMIN/GLOB SERPL: 1.6 G/DL
ALP SERPL-CCNC: 82 U/L (ref 39–117)
ALT SERPL W P-5'-P-CCNC: 26 U/L (ref 1–33)
ANION GAP SERPL CALCULATED.3IONS-SCNC: 10 MMOL/L (ref 5–15)
AST SERPL-CCNC: 27 U/L (ref 1–32)
BASOPHILS # BLD AUTO: 0.01 10*3/MM3 (ref 0–0.2)
BASOPHILS NFR BLD AUTO: 0.3 % (ref 0–1.5)
BILIRUB SERPL-MCNC: <0.2 MG/DL (ref 0–1.2)
BUN SERPL-MCNC: 15 MG/DL (ref 8–23)
BUN/CREAT SERPL: 23.8 (ref 7–25)
CALCIUM SPEC-SCNC: 9.2 MG/DL (ref 8.6–10.5)
CHLORIDE SERPL-SCNC: 106 MMOL/L (ref 98–107)
CO2 SERPL-SCNC: 27 MMOL/L (ref 22–29)
CREAT SERPL-MCNC: 0.63 MG/DL (ref 0.57–1)
DEPRECATED RDW RBC AUTO: 57.6 FL (ref 37–54)
EGFRCR SERPLBLD CKD-EPI 2021: 100.4 ML/MIN/1.73
EOSINOPHIL # BLD AUTO: 0.07 10*3/MM3 (ref 0–0.4)
EOSINOPHIL NFR BLD AUTO: 1.8 % (ref 0.3–6.2)
ERYTHROCYTE [DISTWIDTH] IN BLOOD BY AUTOMATED COUNT: 15 % (ref 12.3–15.4)
GLOBULIN UR ELPH-MCNC: 2.3 GM/DL
GLUCOSE SERPL-MCNC: 151 MG/DL (ref 65–99)
HCT VFR BLD AUTO: 39.6 % (ref 34–46.6)
HGB BLD-MCNC: 12.6 G/DL (ref 12–15.9)
IMM GRANULOCYTES # BLD AUTO: 0.01 10*3/MM3 (ref 0–0.05)
IMM GRANULOCYTES NFR BLD AUTO: 0.3 % (ref 0–0.5)
LYMPHOCYTES # BLD AUTO: 1.63 10*3/MM3 (ref 0.7–3.1)
LYMPHOCYTES NFR BLD AUTO: 42.1 % (ref 19.6–45.3)
MAGNESIUM SERPL-MCNC: 1.6 MG/DL (ref 1.6–2.4)
MCH RBC QN AUTO: 33.4 PG (ref 26.6–33)
MCHC RBC AUTO-ENTMCNC: 31.8 G/DL (ref 31.5–35.7)
MCV RBC AUTO: 105 FL (ref 79–97)
MONOCYTES # BLD AUTO: 0.21 10*3/MM3 (ref 0.1–0.9)
MONOCYTES NFR BLD AUTO: 5.4 % (ref 5–12)
NEUTROPHILS NFR BLD AUTO: 1.94 10*3/MM3 (ref 1.7–7)
NEUTROPHILS NFR BLD AUTO: 50.1 % (ref 42.7–76)
NRBC BLD AUTO-RTO: 0 /100 WBC (ref 0–0.2)
PLATELET # BLD AUTO: 70 10*3/MM3 (ref 140–450)
PMV BLD AUTO: 10.9 FL (ref 6–12)
POTASSIUM SERPL-SCNC: 4.4 MMOL/L (ref 3.5–5.2)
PROT SERPL-MCNC: 6 G/DL (ref 6–8.5)
RBC # BLD AUTO: 3.77 10*6/MM3 (ref 3.77–5.28)
SODIUM SERPL-SCNC: 143 MMOL/L (ref 136–145)
WBC NRBC COR # BLD: 3.87 10*3/MM3 (ref 3.4–10.8)

## 2023-01-23 PROCEDURE — 99214 OFFICE O/P EST MOD 30 MIN: CPT | Performed by: PHYSICIAN ASSISTANT

## 2023-01-23 PROCEDURE — 36415 COLL VENOUS BLD VENIPUNCTURE: CPT

## 2023-01-23 PROCEDURE — 85025 COMPLETE CBC W/AUTO DIFF WBC: CPT

## 2023-01-23 PROCEDURE — 83735 ASSAY OF MAGNESIUM: CPT | Performed by: OBSTETRICS & GYNECOLOGY

## 2023-01-23 PROCEDURE — 80053 COMPREHEN METABOLIC PANEL: CPT | Performed by: OBSTETRICS & GYNECOLOGY

## 2023-01-23 NOTE — PROGRESS NOTES
Age: 63 y.o.  Sex: female  :  1960  MRN: [id number]              Joanne Contreras presents to [facility] for KARY lab review.  s/p C4 Carboplatin/Paclitaxel.  No new complaints today.  Denies pain.  No urinary or bowel changes.  States she is taking her insulin scheduled.  Denies taking a BP medication.      Oncology/Hematology History Overview Note   • 22: CT A/P - Findings suspicious for peritoneal carcinomatosis with ascites and metastatic disease to both ovaries. Recommend diagnostic paracentesis for cytology. Also noted is a porcelain gallbladder and pancreatic atrophy.  • 22: Paracentesis - rare atypical cells suspicious but not diagnostic for carcinoma. Reactive mesothelium & lymphocytes.   • 22: Pelvic US - The bilateral ovaries appear slightly enlarged and heterogeneous but no dominant adnexal masses are seen.  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   • 22: Paracentesis - removed 3400 mL of dark katiana fluid   • 22: CT C/A/P - Interval response to therapy. Significant interval regression of abdominal and pelvic ascites with loculated features and associated peritoneal thickening and abnormal enhancement. Interval decrease in retroperitoneal adenopathy. No new evidence of solid organ metastasis. Dilated porcelain gallbladder with a rind of persistent soft tissue thickening similar to the prior study. No new biliary ductal dilatation. The ovaries appear less prominent and demonstrate interval decrease in abnormal enhancement and cystic change. Advanced atherosclerotic change. Intermittent and/or absence of flow within the bilateral iliac systems. Evidence of prior vascular surgery.        23: Kary Labs         CURRENT TREATMENT:  • 22: CYCLE 1 - Paclitaxel 175 mg/m2  Carboplatin AUC = 6   • 10/19/22: CYCLE 2 - Paclitaxel 175 mg/m2  Carboplatin AUC = 6   ANC: 1.02  • 22:  CYCLE 3 - Paclitaxel 175 mg/m2  Carboplatin AUC = 6   • 1/13/22: CYCLE 4 - Paclitaxel 175 mg/m2  Carboplatin AUC = 6                Fallopian tube carcinoma, unspecified laterality (HCC)       Past Medical History:  Past Medical History:   Diagnosis Date   • Anemia    • Coronary artery disease    • Diabetes mellitus (HCC)    • Hyperlipidemia    • Hypertension    • Peripheral vascular disease, secondary (HCC)    • Stroke (HCC) 2022       Past Surgical History:  Past Surgical History:   Procedure Laterality Date   • ABOVE KNEE AMPUTATION Left    • BELOW KNEE AMPUTATION Right    • TOE SURGERY     • VENOUS ACCESS DEVICE (PORT) INSERTION Right 9/28/2022    Procedure: INSERTION VENOUS ACCESS DEVICE;  Surgeon: Adama Barlow MD;  Location: [facility];  Service: General;  Laterality: Right;        MEDICATIONS:    Current Outpatient Medications:   •  acetaminophen (TYLENOL) 325 MG tablet, Take 650 mg by mouth Every 6 (Six) Hours As Needed for Mild Pain (1-3)., Disp: , Rfl:   •  aspirin 81 MG chewable tablet, Chew 81 mg Daily., Disp: , Rfl:   •  atorvastatin (LIPITOR) 20 MG tablet, Take 20 mg by mouth Daily., Disp: , Rfl:   •  carvedilol (COREG) 3.125 MG tablet, Take 3.125 mg by mouth 2 (Two) Times a Day With Meals., Disp: , Rfl:   •  Continuous Blood Gluc Sensor (FreeStyle Dori 2 Sensor) misc, , Disp: , Rfl:   •  docusate sodium (COLACE) 100 MG capsule, Take 100 mg by mouth 2 (Two) Times a Day., Disp: , Rfl:   •  gabapentin (NEURONTIN) 300 MG capsule, Take 1 capsule by mouth 3 (Three) Times a Day., Disp: 90 capsule, Rfl: 3  •  insulin aspart (novoLOG) 100 UNIT/ML injection, Inject 4 Units under the skin 3 (Three) Times a Day Before Meals., Disp: , Rfl:   •  Insulin Glargine (LANTUS SC), Inject 20 Units under the skin 2 (Two) Times a Day., Disp: , Rfl:   •  Insulin Lispro, 1 Unit Dial, (HUMALOG) 100 UNIT/ML solution pen-injector, , Disp: , Rfl:   •  magnesium oxide (MAG-OX) 400 MG tablet, Take 1 tablet by mouth 2  (Two) Times a Day for 90 days., Disp: 60 tablet, Rfl: 2  •  Multiple Vitamins-Minerals (MULTIVITAMIN WITH MINERALS) tablet tablet, Take 1 tablet by mouth Daily., Disp: , Rfl:   •  O2 (OXYGEN), Inhale 2 L/min 1 (One) Time., Disp: , Rfl:   •  OLANZapine (ZyPREXA) 5 MG tablet, Take 1 tablet by mouth Every Night. Take on days 2, 3 and 4 after chemotherapy., Disp: 3 tablet, Rfl: 5  •  ondansetron (ZOFRAN) 4 MG tablet, Take 4 mg by mouth Every 8 (Eight) Hours As Needed for Nausea or Vomiting., Disp: , Rfl:   •  ondansetron (ZOFRAN) 8 MG tablet, Take 1 tablet by mouth 3 (Three) Times a Day As Needed for Nausea or Vomiting., Disp: 30 tablet, Rfl: 5  •  pantoprazole (PROTONIX) 20 MG EC tablet, , Disp: , Rfl:   •  pantoprazole (PROTONIX) 40 MG EC tablet, Take 40 mg by mouth Daily., Disp: , Rfl:   •  saccharomyces boulardii (FLORASTOR) 250 MG capsule, Take 250 mg by mouth 2 (Two) Times a Day., Disp: , Rfl:   •  senna (SENOKOT) 8.6 MG tablet tablet, Take  by mouth Every Night., Disp: , Rfl:     ALLERGIES:  Allergies   Allergen Reactions   • Beta Adrenergic Blockers Other (See Comments)     Make PAD pain / claudication worse         ROS:  As listed above, otherwise negative.      PHYSICAL EXAM:  There were no vitals filed for this visit.    There is no height or weight on file to calculate BMI.    Current Status 1/13/2023   ECOG score 0     PHQ-9 Total Score:           GEN: alert, normal affect, in no acute distress, in wheelchair  GYN: deferred        Result Review :  The pertinent labs, images, and/or pathology as noted in the oncology history were reviewed independently and discussed with the patient.   Lorelei Nieto PA-C   01/23/2023      [facility] LABS:   WBC   Date Value Ref Range Status   01/13/2023 8.59 3.40 - 10.80 10*3/mm3 Final   06/12/2020 7.0 3.7 - 10.3 x10(3)/mcL Final     RBC   Date Value Ref Range Status   01/13/2023 4.35 3.77 - 5.28 10*6/mm3 Final   06/12/2020 4.91 3.90 - 5.20 x10(6)/mcL Final     Hemoglobin   Date  Value Ref Range Status   01/13/2023 14.0 12.0 - 15.9 g/dL Final   06/12/2020 14.9 11.2 - 15.7 g/dL Final     Hematocrit   Date Value Ref Range Status   01/13/2023 44.6 34.0 - 46.6 % Final   06/12/2020 46.9 (H) 34.0 - 45.0 % Final     Platelets   Date Value Ref Range Status   01/13/2023 181 140 - 450 10*3/mm3 Final   06/12/2020 184 155 - 369 x10(3)/mcL Final        Date Value Ref Range Status   01/13/2023 43.9 (H) 0.0 - 38.1 U/mL Final       [facility] IMAGING:  CT Chest With Contrast Diagnostic    Result Date: 11/29/2022  1. No new findings to suggest metastatic disease in the chest. 2. Old healed granulomatous disease. 3. Advanced atherosclerotic changes. Abdomen and pelvis CT dictated separately. Signer Name: Jesse Irby MD  Signed: 11/29/2022 8:26 AM  Workstation Name: [email or web address]  [facility]    CT Abdomen Pelvis With Contrast    Result Date: 11/29/2022  1. Interval response to therapy. Significant interval regression of abdominal and pelvic ascites with loculated features and associated peritoneal thickening and abnormal enhancement. Interval decrease in retroperitoneal adenopathy. No new evidence of solid organ metastasis. 2. Dilated porcelain gallbladder with a rind of persistent soft tissue thickening similar to the prior study. No new biliary ductal dilatation. 3. The ovaries appear less prominent and demonstrate interval decrease in abnormal enhancement and cystic change. 4. Advanced atherosclerotic change. Intermittent and/or absence of flow within the bilateral iliac systems. Evidence of prior vascular surgery. Signer Name: Jesse Irby MD  Signed: 11/29/2022 8:46 AM  Workstation Name: [email or web address]  [facility]            ASSESSMENT :  • HG mullerian carcinoma - dx via omental bx 9/23/22  • Paracentesis 3400mL  9/26/22  •   = 6024 at diagnosis, most recent 43.9 on 1/13/23  • Carboplatin AUC 6 (550mg capped) / Taxol 175mg/m2 x 3C   • CT with improvement  in lesion size, resolution of ascites.   • Deemed unacceptable surgical risk due to diffuse triple vessel disease and no opitons for PCI or surgical revascularization. Dr Arturo Vasquez is her cardiologist   • Uncontrolled DM - A1C 11 -  17U humalog AC, Lantus 68U BID   - Non compliance with diabetes management  • Porcelain gallbladder   • CAD - on ASA and statin   • HTN - uncontrolled, not taking medication  • Peripheral vascular disease- post R BKA / L AKA    • Failed cardiac clearance - high risk with non modifiable factors, diffuse triple vessel coronary artery disease , no options for PCI or surgical revascularization  • Noncomplaince affecting her care   • Difficulty coping            PLAN :  - labs reviewed, no new changes today  - educated pt on importance of taking BP medication as prescribed, recommended pt to follow up with PCP this week so that she could have BP and DM medications reviewed  - will see back in office prior to next infusion   - all questions and concerns answered              Gynecologic Oncology   [address] [address] [phone] office

## 2023-02-03 ENCOUNTER — OFFICE VISIT (OUTPATIENT)
Dept: GYNECOLOGIC ONCOLOGY | Facility: CLINIC | Age: 63
End: 2023-02-03
Payer: MEDICARE

## 2023-02-03 ENCOUNTER — INFUSION (OUTPATIENT)
Dept: ONCOLOGY | Facility: HOSPITAL | Age: 63
End: 2023-02-03
Payer: MEDICARE

## 2023-02-03 ENCOUNTER — TREATMENT (OUTPATIENT)
Dept: GYNECOLOGIC ONCOLOGY | Facility: CLINIC | Age: 63
End: 2023-02-03
Payer: MEDICARE

## 2023-02-03 ENCOUNTER — APPOINTMENT (OUTPATIENT)
Dept: ONCOLOGY | Facility: HOSPITAL | Age: 63
End: 2023-02-03
Payer: MEDICARE

## 2023-02-03 ENCOUNTER — TELEPHONE (OUTPATIENT)
Dept: GYNECOLOGIC ONCOLOGY | Facility: CLINIC | Age: 63
End: 2023-02-03
Payer: MEDICARE

## 2023-02-03 VITALS
DIASTOLIC BLOOD PRESSURE: 101 MMHG | OXYGEN SATURATION: 96 % | RESPIRATION RATE: 16 BRPM | TEMPERATURE: 97.4 F | SYSTOLIC BLOOD PRESSURE: 200 MMHG | HEART RATE: 78 BPM

## 2023-02-03 DIAGNOSIS — C56.9 MALIGNANT NEOPLASM OF OVARY, UNSPECIFIED LATERALITY: ICD-10-CM

## 2023-02-03 DIAGNOSIS — C57.00 FALLOPIAN TUBE CARCINOMA, UNSPECIFIED LATERALITY: Primary | ICD-10-CM

## 2023-02-03 DIAGNOSIS — Z45.9 ENCOUNTER FOR MANAGEMENT OF IMPLANTED DEVICE: ICD-10-CM

## 2023-02-03 LAB
ALBUMIN SERPL-MCNC: 3.7 G/DL (ref 3.5–5.2)
ALBUMIN/GLOB SERPL: 1.2 G/DL (ref 1.1–2.4)
ALP SERPL-CCNC: 99 U/L (ref 38–116)
ALT SERPL W P-5'-P-CCNC: 24 U/L (ref 0–33)
ANION GAP SERPL CALCULATED.3IONS-SCNC: 10.4 MMOL/L (ref 5–15)
AST SERPL-CCNC: 23 U/L (ref 0–32)
BASOPHILS # BLD AUTO: 0.03 10*3/MM3 (ref 0–0.2)
BASOPHILS NFR BLD AUTO: 0.6 % (ref 0–1.5)
BILIRUB SERPL-MCNC: <0.2 MG/DL (ref 0.2–1.2)
BUN SERPL-MCNC: 13 MG/DL (ref 6–20)
BUN/CREAT SERPL: 20.6 (ref 7.3–30)
CALCIUM SPEC-SCNC: 9.9 MG/DL (ref 8.5–10.2)
CANCER AG125 SERPL QL: 30.5 U/ML (ref 0–38.1)
CHLORIDE SERPL-SCNC: 105 MMOL/L (ref 98–107)
CO2 SERPL-SCNC: 26.6 MMOL/L (ref 22–29)
CREAT SERPL-MCNC: 0.63 MG/DL (ref 0.6–1.1)
DEPRECATED RDW RBC AUTO: 56.3 FL (ref 37–54)
EGFRCR SERPLBLD CKD-EPI 2021: 100.4 ML/MIN/1.73
EOSINOPHIL # BLD AUTO: 0.07 10*3/MM3 (ref 0–0.4)
EOSINOPHIL NFR BLD AUTO: 1.3 % (ref 0.3–6.2)
ERYTHROCYTE [DISTWIDTH] IN BLOOD BY AUTOMATED COUNT: 15.1 % (ref 12.3–15.4)
GLOBULIN UR ELPH-MCNC: 3 GM/DL (ref 1.8–3.5)
GLUCOSE SERPL-MCNC: 191 MG/DL (ref 74–124)
HCT VFR BLD AUTO: 39.9 % (ref 34–46.6)
HGB BLD-MCNC: 12.8 G/DL (ref 12–15.9)
IMM GRANULOCYTES # BLD AUTO: 0.02 10*3/MM3 (ref 0–0.05)
IMM GRANULOCYTES NFR BLD AUTO: 0.4 % (ref 0–0.5)
LYMPHOCYTES # BLD AUTO: 1.98 10*3/MM3 (ref 0.7–3.1)
LYMPHOCYTES NFR BLD AUTO: 37.1 % (ref 19.6–45.3)
MCH RBC QN AUTO: 33.3 PG (ref 26.6–33)
MCHC RBC AUTO-ENTMCNC: 32.1 G/DL (ref 31.5–35.7)
MCV RBC AUTO: 103.9 FL (ref 79–97)
MONOCYTES # BLD AUTO: 0.53 10*3/MM3 (ref 0.1–0.9)
MONOCYTES NFR BLD AUTO: 9.9 % (ref 5–12)
NEUTROPHILS NFR BLD AUTO: 2.7 10*3/MM3 (ref 1.7–7)
NEUTROPHILS NFR BLD AUTO: 50.7 % (ref 42.7–76)
NRBC BLD AUTO-RTO: 0 /100 WBC (ref 0–0.2)
PLATELET # BLD AUTO: 121 10*3/MM3 (ref 140–450)
PMV BLD AUTO: 10.1 FL (ref 6–12)
POTASSIUM SERPL-SCNC: 4 MMOL/L (ref 3.5–4.7)
PROT SERPL-MCNC: 6.7 G/DL (ref 6.3–8)
RBC # BLD AUTO: 3.84 10*6/MM3 (ref 3.77–5.28)
SODIUM SERPL-SCNC: 142 MMOL/L (ref 134–145)
WBC NRBC COR # BLD: 5.33 10*3/MM3 (ref 3.4–10.8)

## 2023-02-03 PROCEDURE — 99214 OFFICE O/P EST MOD 30 MIN: CPT | Performed by: PHYSICIAN ASSISTANT

## 2023-02-03 PROCEDURE — 80053 COMPREHEN METABOLIC PANEL: CPT

## 2023-02-03 PROCEDURE — 86304 IMMUNOASSAY TUMOR CA 125: CPT | Performed by: OBSTETRICS & GYNECOLOGY

## 2023-02-03 PROCEDURE — 25010000002 HEPARIN LOCK FLUSH PER 10 UNITS: Performed by: OBSTETRICS & GYNECOLOGY

## 2023-02-03 PROCEDURE — 85025 COMPLETE CBC W/AUTO DIFF WBC: CPT

## 2023-02-03 PROCEDURE — 36591 DRAW BLOOD OFF VENOUS DEVICE: CPT

## 2023-02-03 RX ORDER — HEPARIN SODIUM (PORCINE) LOCK FLUSH IV SOLN 100 UNIT/ML 100 UNIT/ML
500 SOLUTION INTRAVENOUS AS NEEDED
Status: CANCELLED | OUTPATIENT
Start: 2023-02-03

## 2023-02-03 RX ORDER — SODIUM CHLORIDE 0.9 % (FLUSH) 0.9 %
10 SYRINGE (ML) INJECTION AS NEEDED
Status: CANCELLED | OUTPATIENT
Start: 2023-02-03

## 2023-02-03 RX ORDER — SODIUM CHLORIDE 0.9 % (FLUSH) 0.9 %
10 SYRINGE (ML) INJECTION AS NEEDED
Status: ACTIVE | OUTPATIENT
Start: 2023-02-03

## 2023-02-03 RX ORDER — HEPARIN SODIUM (PORCINE) LOCK FLUSH IV SOLN 100 UNIT/ML 100 UNIT/ML
500 SOLUTION INTRAVENOUS AS NEEDED
Status: ACTIVE | OUTPATIENT
Start: 2023-02-03

## 2023-02-03 RX ADMIN — Medication 500 UNITS: at 09:08

## 2023-02-03 RX ADMIN — Medication 10 ML: at 09:08

## 2023-02-03 NOTE — PROGRESS NOTES
Age: 63 y.o.  Sex: female  :  1960  MRN: [id number]              Joanne Contreras presents to [facility] for evaluation prior to C5 Carboplatin/Paclitaxel.  Overall doing well.  Occasional RUQ tenderness.  Does not need to take anything for pain.  Unsure if food related.   No urinary or bowel changes.  States she is taking her insulin scheduled.  Denies taking a BP medication- /101 in office today.  States she attempted to see PCP regarding BP medication, but appointment was cancelled due to provider being ill, per pt.      Oncology/Hematology History Overview Note   • 22: CT A/P - Findings suspicious for peritoneal carcinomatosis with ascites and metastatic disease to both ovaries. Recommend diagnostic paracentesis for cytology. Also noted is a porcelain gallbladder and pancreatic atrophy.  • 22: Paracentesis - rare atypical cells suspicious but not diagnostic for carcinoma. Reactive mesothelium & lymphocytes.   • 22: Pelvic US - The bilateral ovaries appear slightly enlarged and heterogeneous but no dominant adnexal masses are seen.  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   • 22: Paracentesis - removed 3400 mL of dark katiana fluid   • 22: CT C/A/P - Interval response to therapy. Significant interval regression of abdominal and pelvic ascites with loculated features and associated peritoneal thickening and abnormal enhancement. Interval decrease in retroperitoneal adenopathy. No new evidence of solid organ metastasis. Dilated porcelain gallbladder with a rind of persistent soft tissue thickening similar to the prior study. No new biliary ductal dilatation. The ovaries appear less prominent and demonstrate interval decrease in abnormal enhancement and cystic change. Advanced atherosclerotic change. Intermittent and/or absence of flow within the bilateral iliac systems. Evidence of prior  vascular surgery.        2/3/23: C5 Carbo/Taxol      CURRENT TREATMENT:  • 9/28/22: CYCLE 1 - Paclitaxel 175 mg/m2  Carboplatin AUC = 6   • 10/19/22: CYCLE 2 - Paclitaxel 175 mg/m2  Carboplatin AUC = 6   ANC: 1.02  • 11/9/22: CYCLE 3 - Paclitaxel 175 mg/m2  Carboplatin AUC = 6   • 1/13/22: CYCLE 4 - Paclitaxel 175 mg/m2  Carboplatin AUC = 6   • 2/3/23: CYCLE 5                Fallopian tube carcinoma, unspecified laterality (HCC)       Past Medical History:  Past Medical History:   Diagnosis Date   • Anemia    • Coronary artery disease    • Diabetes mellitus (HCC)    • Hyperlipidemia    • Hypertension    • Peripheral vascular disease, secondary (HCC)    • Stroke (HCC) 2022       Past Surgical History:  Past Surgical History:   Procedure Laterality Date   • ABOVE KNEE AMPUTATION Left    • BELOW KNEE AMPUTATION Right    • TOE SURGERY     • VENOUS ACCESS DEVICE (PORT) INSERTION Right 9/28/2022    Procedure: INSERTION VENOUS ACCESS DEVICE;  Surgeon: Adama Barlow MD;  Location: [facility];  Service: General;  Laterality: Right;        MEDICATIONS:    Current Outpatient Medications:   •  acetaminophen (TYLENOL) 325 MG tablet, Take 650 mg by mouth Every 6 (Six) Hours As Needed for Mild Pain (1-3)., Disp: , Rfl:   •  aspirin 81 MG chewable tablet, Chew 81 mg Daily., Disp: , Rfl:   •  atorvastatin (LIPITOR) 20 MG tablet, Take 20 mg by mouth Daily., Disp: , Rfl:   •  carvedilol (COREG) 3.125 MG tablet, Take 3.125 mg by mouth 2 (Two) Times a Day With Meals., Disp: , Rfl:   •  gabapentin (NEURONTIN) 300 MG capsule, Take 1 capsule by mouth 3 (Three) Times a Day. (Patient taking differently: Take 300 mg by mouth As Needed.), Disp: 90 capsule, Rfl: 3  •  Insulin Glargine (LANTUS SC), Inject 20 Units under the skin 2 (Two) Times a Day., Disp: , Rfl:   •  Insulin Lispro, 1 Unit Dial, (HUMALOG) 100 UNIT/ML solution pen-injector, , Disp: , Rfl:   •  ondansetron (ZOFRAN) 4 MG tablet, Take 4 mg by mouth Every 8 (Eight) Hours  As Needed for Nausea or Vomiting., Disp: , Rfl:   •  ondansetron (ZOFRAN) 8 MG tablet, Take 1 tablet by mouth 3 (Three) Times a Day As Needed for Nausea or Vomiting., Disp: 30 tablet, Rfl: 5  •  Continuous Blood Gluc Sensor (FreeStyle Dori 2 Sensor) misc, , Disp: , Rfl:   •  docusate sodium (COLACE) 100 MG capsule, Take 100 mg by mouth 2 (Two) Times a Day., Disp: , Rfl:   •  insulin aspart (novoLOG) 100 UNIT/ML injection, Inject 4 Units under the skin 3 (Three) Times a Day Before Meals., Disp: , Rfl:   •  Multiple Vitamins-Minerals (MULTIVITAMIN WITH MINERALS) tablet tablet, Take 1 tablet by mouth Daily., Disp: , Rfl:   •  O2 (OXYGEN), Inhale 2 L/min 1 (One) Time., Disp: , Rfl:   •  OLANZapine (ZyPREXA) 5 MG tablet, Take 1 tablet by mouth Every Night. Take on days 2, 3 and 4 after chemotherapy., Disp: 3 tablet, Rfl: 5  •  pantoprazole (PROTONIX) 20 MG EC tablet, , Disp: , Rfl:   •  pantoprazole (PROTONIX) 40 MG EC tablet, Take 40 mg by mouth Daily., Disp: , Rfl:   •  saccharomyces boulardii (FLORASTOR) 250 MG capsule, Take 250 mg by mouth 2 (Two) Times a Day., Disp: , Rfl:   •  senna (SENOKOT) 8.6 MG tablet tablet, Take  by mouth Every Night., Disp: , Rfl:     ALLERGIES:  Allergies   Allergen Reactions   • Beta Adrenergic Blockers Other (See Comments)     Make PAD pain / claudication worse         ROS:  As listed above, otherwise negative.      PHYSICAL EXAM:  Vitals:    02/03/23 0834   BP: (!) 200/101  Comment: took twice, bp has been high was sipossed to see pcp, but pt stated that her appt got canceled   Pulse: 78   Resp: 16   Temp: 97.4 °F (36.3 °C)   TempSrc: Temporal   SpO2: 96%   Weight: Comment: unable to obtain   Height: Comment: unable to obtain   PainSc:   2   PainLoc: Finger  Comment: finger tips hurt and feel numb       There is no height or weight on file to calculate BMI.    Current Status 2/3/2023   ECOG score 1     PHQ-9 Total Score:           GEN: alert, normal affect, in no acute distress, in  wheelchair  GYN: deferred    No change to exam today. Lorelei Nieto PA-C        Result Review :  The pertinent labs, images, and/or pathology as noted in the oncology history were reviewed independently and discussed with the patient.   Lorelei Nieto PA-C   01/23/2023      [facility]:   WBC   Date Value Ref Range Status   02/03/2023 5.33 3.40 - 10.80 10*3/mm3 Final   06/12/2020 7.0 3.7 - 10.3 x10(3)/mcL Final     RBC   Date Value Ref Range Status   02/03/2023 3.84 3.77 - 5.28 10*6/mm3 Final   06/12/2020 4.91 3.90 - 5.20 x10(6)/mcL Final     Hemoglobin   Date Value Ref Range Status   02/03/2023 12.8 12.0 - 15.9 g/dL Final   06/12/2020 14.9 11.2 - 15.7 g/dL Final     Hematocrit   Date Value Ref Range Status   02/03/2023 39.9 34.0 - 46.6 % Final   06/12/2020 46.9 (H) 34.0 - 45.0 % Final     Platelets   Date Value Ref Range Status   02/03/2023 121 (L) 140 - 450 10*3/mm3 Final   06/12/2020 184 155 - 369 x10(3)/mcL Final        Date Value Ref Range Status   01/13/2023 43.9 (H) 0.0 - 38.1 U/mL Final       [facility] IMAGING:  CT Chest With Contrast Diagnostic    Result Date: 11/29/2022  1. No new findings to suggest metastatic disease in the chest. 2. Old healed granulomatous disease. 3. Advanced atherosclerotic changes. Abdomen and pelvis CT dictated separately. Signer Name: Jesse Irby MD  Signed: 11/29/2022 8:26 AM  Workstation Name: [email or web address]  [facility]    CT Abdomen Pelvis With Contrast    Result Date: 11/29/2022  1. Interval response to therapy. Significant interval regression of abdominal and pelvic ascites with loculated features and associated peritoneal thickening and abnormal enhancement. Interval decrease in retroperitoneal adenopathy. No new evidence of solid organ metastasis. 2. Dilated porcelain gallbladder with a rind of persistent soft tissue thickening similar to the prior study. No new biliary ductal dilatation. 3. The ovaries appear less prominent and demonstrate interval  decrease in abnormal enhancement and cystic change. 4. Advanced atherosclerotic change. Intermittent and/or absence of flow within the bilateral iliac systems. Evidence of prior vascular surgery. 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   • Deemed unacceptable surgical risk due to diffuse triple vessel disease and no opitons for PCI or surgical revascularization. Dr Arturo Vasquez is her cardiologist   • Uncontrolled DM - A1C 11 -  17U humalog AC, Lantus 68U BID   - Pt states she is taking DM medication as prescribed, glucose 191 today  • Porcelain gallbladder   • CAD - on ASA and statin   • HTN - uncontrolled, not taking medication  • Peripheral vascular disease- post R BKA / L AKA    • Failed cardiac clearance - high risk with non modifiable factors, diffuse triple vessel coronary artery disease , no options for PCI or surgical revascularization  • Noncomplaince affecting her care   • Difficulty coping            PLAN :  - labs reviewed  - BP too high for infusion today: educated pt on importance of taking BP medication as prescribed, recommended pt to follow up with PCP this week so that she could have BP and DM medications reviewed; will attempt to reach out to PCP to help get pt seen ASAP  - will see back in office in one week to recheck labs and vitals, will evaluate if okay for C5 at that time  - all questions and concerns answered              Gynecologic Oncology   [address] [place] [address] [address] [place] [id number] [phone] office

## 2023-02-10 ENCOUNTER — DOCUMENTATION (OUTPATIENT)
Dept: GYNECOLOGIC ONCOLOGY | Facility: CLINIC | Age: 63
End: 2023-02-10
Payer: MEDICARE

## 2023-02-10 NOTE — NURSING NOTE
Patient came to office this morning thinking she had chemotherapy scheduled for today.  RN checked patient's blood pressure 157/86 today. Patient reports she saw her PCP on Monday 2/6/23 and was told to start taking Coreg BID, patient reports she has been taking as ordered. RN provided patient with Blood Pressure education handout from the [employer] and Blood Pressure Log to start filling out daily. Patient agreeable to document blood pressure BID. Plan to restart chemotherapy on Friday 2/17 for C5 Carbo/Taxol. New Calendar provided to patient and verbalized understanding.

## 2023-02-17 ENCOUNTER — TELEPHONE (OUTPATIENT)
Dept: GYNECOLOGIC ONCOLOGY | Facility: CLINIC | Age: 63
End: 2023-02-17
Payer: MEDICARE

## 2023-02-17 ENCOUNTER — OFFICE VISIT (OUTPATIENT)
Dept: GYNECOLOGIC ONCOLOGY | Facility: CLINIC | Age: 63
End: 2023-02-17
Payer: MEDICARE

## 2023-02-17 ENCOUNTER — LAB (OUTPATIENT)
Dept: ONCOLOGY | Facility: HOSPITAL | Age: 63
End: 2023-02-17
Payer: MEDICARE

## 2023-02-17 ENCOUNTER — INFUSION (OUTPATIENT)
Dept: ONCOLOGY | Facility: HOSPITAL | Age: 63
End: 2023-02-17
Payer: MEDICARE

## 2023-02-17 VITALS
HEART RATE: 74 BPM | OXYGEN SATURATION: 98 % | SYSTOLIC BLOOD PRESSURE: 152 MMHG | DIASTOLIC BLOOD PRESSURE: 85 MMHG | RESPIRATION RATE: 12 BRPM | TEMPERATURE: 97.6 F

## 2023-02-17 VITALS — BODY MASS INDEX: 32.67 KG/M2 | WEIGHT: 135.5 LBS

## 2023-02-17 DIAGNOSIS — C57.00 FALLOPIAN TUBE CARCINOMA, UNSPECIFIED LATERALITY: Primary | ICD-10-CM

## 2023-02-17 LAB
ALBUMIN SERPL-MCNC: 3.5 G/DL (ref 3.5–5.2)
ALBUMIN/GLOB SERPL: 1.1 G/DL (ref 1.1–2.4)
ALP SERPL-CCNC: 91 U/L (ref 38–116)
ALT SERPL W P-5'-P-CCNC: 13 U/L (ref 0–33)
ANION GAP SERPL CALCULATED.3IONS-SCNC: 13.6 MMOL/L (ref 5–15)
AST SERPL-CCNC: 19 U/L (ref 0–32)
BASOPHILS # BLD AUTO: 0.06 10*3/MM3 (ref 0–0.2)
BASOPHILS NFR BLD AUTO: 0.5 % (ref 0–1.5)
BILIRUB SERPL-MCNC: 0.2 MG/DL (ref 0.2–1.2)
BUN SERPL-MCNC: 16 MG/DL (ref 6–20)
BUN/CREAT SERPL: 20.5 (ref 7.3–30)
CALCIUM SPEC-SCNC: 9.7 MG/DL (ref 8.5–10.2)
CANCER AG125 SERPL QL: 29.3 U/ML (ref 0–38.1)
CHLORIDE SERPL-SCNC: 102 MMOL/L (ref 98–107)
CO2 SERPL-SCNC: 24.4 MMOL/L (ref 22–29)
CREAT SERPL-MCNC: 0.78 MG/DL (ref 0.6–1.1)
DEPRECATED RDW RBC AUTO: 53.6 FL (ref 37–54)
EGFRCR SERPLBLD CKD-EPI 2021: 86 ML/MIN/1.73
EOSINOPHIL # BLD AUTO: 0.05 10*3/MM3 (ref 0–0.4)
EOSINOPHIL NFR BLD AUTO: 0.4 % (ref 0.3–6.2)
ERYTHROCYTE [DISTWIDTH] IN BLOOD BY AUTOMATED COUNT: 14.2 % (ref 12.3–15.4)
GLOBULIN UR ELPH-MCNC: 3.3 GM/DL (ref 1.8–3.5)
GLUCOSE SERPL-MCNC: 153 MG/DL (ref 74–124)
HCT VFR BLD AUTO: 41.1 % (ref 34–46.6)
HGB BLD-MCNC: 13 G/DL (ref 12–15.9)
IMM GRANULOCYTES # BLD AUTO: 0.05 10*3/MM3 (ref 0–0.05)
IMM GRANULOCYTES NFR BLD AUTO: 0.4 % (ref 0–0.5)
LYMPHOCYTES # BLD AUTO: 2 10*3/MM3 (ref 0.7–3.1)
LYMPHOCYTES NFR BLD AUTO: 17.7 % (ref 19.6–45.3)
MCH RBC QN AUTO: 32.7 PG (ref 26.6–33)
MCHC RBC AUTO-ENTMCNC: 31.6 G/DL (ref 31.5–35.7)
MCV RBC AUTO: 103.5 FL (ref 79–97)
MONOCYTES # BLD AUTO: 1.59 10*3/MM3 (ref 0.1–0.9)
MONOCYTES NFR BLD AUTO: 14.1 % (ref 5–12)
NEUTROPHILS NFR BLD AUTO: 66.9 % (ref 42.7–76)
NEUTROPHILS NFR BLD AUTO: 7.53 10*3/MM3 (ref 1.7–7)
NRBC BLD AUTO-RTO: 0 /100 WBC (ref 0–0.2)
PLATELET # BLD AUTO: 154 10*3/MM3 (ref 140–450)
PMV BLD AUTO: 10.2 FL (ref 6–12)
POTASSIUM SERPL-SCNC: 4.1 MMOL/L (ref 3.5–4.7)
PROT SERPL-MCNC: 6.8 G/DL (ref 6.3–8)
RBC # BLD AUTO: 3.97 10*6/MM3 (ref 3.77–5.28)
SODIUM SERPL-SCNC: 140 MMOL/L (ref 134–145)
WBC NRBC COR # BLD: 11.28 10*3/MM3 (ref 3.4–10.8)

## 2023-02-17 PROCEDURE — A9270 NON-COVERED ITEM OR SERVICE: HCPCS | Performed by: PHYSICIAN ASSISTANT

## 2023-02-17 PROCEDURE — 96417 CHEMO IV INFUS EACH ADDL SEQ: CPT

## 2023-02-17 PROCEDURE — 80053 COMPREHEN METABOLIC PANEL: CPT

## 2023-02-17 PROCEDURE — 25010000002 CARBOPLATIN PER 50 MG: Performed by: PHYSICIAN ASSISTANT

## 2023-02-17 PROCEDURE — 96413 CHEMO IV INFUSION 1 HR: CPT

## 2023-02-17 PROCEDURE — 25010000002 FOSAPREPITANT PER 1 MG: Performed by: PHYSICIAN ASSISTANT

## 2023-02-17 PROCEDURE — 96415 CHEMO IV INFUSION ADDL HR: CPT

## 2023-02-17 PROCEDURE — 86304 IMMUNOASSAY TUMOR CA 125: CPT | Performed by: OBSTETRICS & GYNECOLOGY

## 2023-02-17 PROCEDURE — 96375 TX/PRO/DX INJ NEW DRUG ADDON: CPT

## 2023-02-17 PROCEDURE — 25010000002 PALONOSETRON PER 25 MCG: Performed by: PHYSICIAN ASSISTANT

## 2023-02-17 PROCEDURE — 96367 TX/PROPH/DG ADDL SEQ IV INF: CPT

## 2023-02-17 PROCEDURE — 25010000002 PACLITAXEL PER 1 MG: Performed by: PHYSICIAN ASSISTANT

## 2023-02-17 PROCEDURE — 63710000001 OLANZAPINE 5 MG TABLET: Performed by: PHYSICIAN ASSISTANT

## 2023-02-17 PROCEDURE — 85025 COMPLETE CBC W/AUTO DIFF WBC: CPT

## 2023-02-17 PROCEDURE — 25010000002 DEXAMETHASONE SODIUM PHOSPHATE 100 MG/10ML SOLUTION 10 ML VIAL: Performed by: OBSTETRICS & GYNECOLOGY

## 2023-02-17 PROCEDURE — 25010000002 DIPHENHYDRAMINE PER 50 MG: Performed by: PHYSICIAN ASSISTANT

## 2023-02-17 PROCEDURE — 99214 OFFICE O/P EST MOD 30 MIN: CPT | Performed by: PHYSICIAN ASSISTANT

## 2023-02-17 RX ORDER — PALONOSETRON 0.05 MG/ML
0.25 INJECTION, SOLUTION INTRAVENOUS ONCE
Status: COMPLETED | OUTPATIENT
Start: 2023-02-17 | End: 2023-02-17

## 2023-02-17 RX ORDER — SODIUM CHLORIDE 9 MG/ML
250 INJECTION, SOLUTION INTRAVENOUS ONCE
Status: COMPLETED | OUTPATIENT
Start: 2023-02-17 | End: 2023-02-17

## 2023-02-17 RX ORDER — OLANZAPINE 5 MG/1
5 TABLET ORAL ONCE
Status: COMPLETED | OUTPATIENT
Start: 2023-02-17 | End: 2023-02-17

## 2023-02-17 RX ORDER — FAMOTIDINE 10 MG/ML
20 INJECTION, SOLUTION INTRAVENOUS ONCE
Status: COMPLETED | OUTPATIENT
Start: 2023-02-17 | End: 2023-02-17

## 2023-02-17 RX ADMIN — DEXAMETHASONE SODIUM PHOSPHATE 20 MG: 10 INJECTION, SOLUTION INTRAMUSCULAR; INTRAVENOUS at 10:00

## 2023-02-17 RX ADMIN — FAMOTIDINE 20 MG: 10 INJECTION INTRAVENOUS at 09:32

## 2023-02-17 RX ADMIN — OLANZAPINE 5 MG: 5 TABLET, FILM COATED ORAL at 09:30

## 2023-02-17 RX ADMIN — SODIUM CHLORIDE 250 ML: 9 INJECTION, SOLUTION INTRAVENOUS at 09:30

## 2023-02-17 RX ADMIN — PALONOSETRON HYDROCHLORIDE 0.25 MG: 0.25 INJECTION INTRAVENOUS at 09:30

## 2023-02-17 RX ADMIN — DIPHENHYDRAMINE HYDROCHLORIDE 50 MG: 50 INJECTION, SOLUTION INTRAMUSCULAR; INTRAVENOUS at 09:33

## 2023-02-17 RX ADMIN — PACLITAXEL 260 MG: 6 INJECTION, SOLUTION INTRAVENOUS at 11:34

## 2023-02-17 RX ADMIN — CARBOPLATIN 480 MG: 10 INJECTION, SOLUTION INTRAVENOUS at 14:37

## 2023-02-17 RX ADMIN — SODIUM CHLORIDE 100 ML: 9 INJECTION, SOLUTION INTRAVENOUS at 10:16

## 2023-02-17 NOTE — PROGRESS NOTES
Age: 63 y.o.  Sex: female  :  1960  MRN: [id number]              Joanne Contreras presents to [facility] for evaluation prior to C5 Carboplatin/Paclitaxel.  Treatment initially scheduled for 2/3/23, however uncontrolled hypertension prevented her from infusion (/101).  Pt has since been seeing PCP to control BP, though pt very poor historian- unsure what she is taking.  Pt believes she is taking Coreg BID, unsure of dose.    /84 in office today.  Overall feeling well.  No new complaints.  Denies pain.  Tolerating diet without issue.  No N/V.  Denies bowel or urinary changes.          Oncology/Hematology History Overview Note   • 22: CT A/P - Findings suspicious for peritoneal carcinomatosis with ascites and metastatic disease to both ovaries. Recommend diagnostic paracentesis for cytology. Also noted is a porcelain gallbladder and pancreatic atrophy.  • 22: Paracentesis - rare atypical cells suspicious but not diagnostic for carcinoma. Reactive mesothelium & lymphocytes.   • 22: Pelvic US - The bilateral ovaries appear slightly enlarged and heterogeneous but no dominant adnexal masses are seen.  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   • 22: Paracentesis - removed 3400 mL of dark katiana fluid   • 22-present: Paclitaxel  Carboplatin  • 22: CT C/A/P - Interval response to therapy. Significant interval regression of abdominal and pelvic ascites with loculated features and associated peritoneal thickening and abnormal enhancement. Interval decrease in retroperitoneal adenopathy. No new evidence of solid organ metastasis. Dilated porcelain gallbladder with a rind of persistent soft tissue thickening similar to the prior study. No new biliary ductal dilatation. The ovaries appear less prominent and demonstrate interval decrease in abnormal enhancement and cystic change. Advanced  atherosclerotic change. Intermittent and/or absence of flow within the bilateral iliac systems. Evidence of prior vascular surgery.        2/17/23: C5 Carbo/Taxol      CURRENT TREATMENT:  • 9/28/22: CYCLE 1 - Paclitaxel 175 mg/m2  Carboplatin AUC = 6   • 10/19/22: CYCLE 2 - Paclitaxel 175 mg/m2  Carboplatin AUC = 6   ANC: 1.02  • 11/9/22: CYCLE 3 - Paclitaxel 175 mg/m2  Carboplatin AUC = 6   • 1/13/23: CYCLE 4 - Paclitaxel 175 mg/m2  Carboplatin AUC = 6   • 2/3/23: C5 held due to uncontrolled hypertension     Fallopian tube carcinoma, unspecified laterality (HCC)   9/19/2022 -  Other Event    CA 19-9: Less than 2.0      Other Event    CA-125  2/3/2023 30.5   1/13/2023 43.9   11/30/2022 70.2   10/26/2022 1182   10/19/2022 2059   9/19/2022 6024            Past Medical History:  Past Medical History:   Diagnosis Date   • Anemia    • Coronary artery disease    • Diabetes mellitus (HCC)    • Hyperlipidemia    • Hypertension    • Peripheral vascular disease, secondary (HCC)    • Stroke (HCC) 2022       Past Surgical History:  Past Surgical History:   Procedure Laterality Date   • ABOVE KNEE AMPUTATION Left    • BELOW KNEE AMPUTATION Right    • TOE SURGERY     • VENOUS ACCESS DEVICE (PORT) INSERTION Right 9/28/2022    Procedure: INSERTION VENOUS ACCESS DEVICE;  Surgeon: Adama Barlow MD;  Location: [facility];  Service: General;  Laterality: Right;        MEDICATIONS:    Current Outpatient Medications:   •  acetaminophen (TYLENOL) 325 MG tablet, Take 650 mg by mouth Every 6 (Six) Hours As Needed for Mild Pain (1-3)., Disp: , Rfl:   •  aspirin 81 MG chewable tablet, Chew 81 mg Daily., Disp: , Rfl:   •  carvedilol (COREG) 3.125 MG tablet, Take 3.125 mg by mouth 2 (Two) Times a Day With Meals., Disp: , Rfl:   •  docusate sodium (COLACE) 100 MG capsule, Take 100 mg by mouth 2 (Two) Times a Day., Disp: , Rfl:   •  gabapentin (NEURONTIN) 300 MG capsule, Take 1 capsule by mouth 3 (Three) Times a Day. (Patient taking  differently: Take 300 mg by mouth As Needed.), Disp: 90 capsule, Rfl: 3  •  insulin aspart (novoLOG) 100 UNIT/ML injection, Inject 4 Units under the skin 3 (Three) Times a Day Before Meals., Disp: , Rfl:   •  Insulin Glargine (LANTUS SC), Inject 20 Units under the skin 2 (Two) Times a Day., Disp: , Rfl:   •  Insulin Lispro, 1 Unit Dial, (HUMALOG) 100 UNIT/ML solution pen-injector, , Disp: , Rfl:   •  Multiple Vitamins-Minerals (MULTIVITAMIN WITH MINERALS) tablet tablet, Take 1 tablet by mouth Daily., Disp: , Rfl:   •  ondansetron (ZOFRAN) 4 MG tablet, Take 4 mg by mouth Every 8 (Eight) Hours As Needed for Nausea or Vomiting., Disp: , Rfl:   •  ondansetron (ZOFRAN) 8 MG tablet, Take 1 tablet by mouth 3 (Three) Times a Day As Needed for Nausea or Vomiting., Disp: 30 tablet, Rfl: 5  •  atorvastatin (LIPITOR) 20 MG tablet, Take 20 mg by mouth Daily., Disp: , Rfl:   •  Continuous Blood Gluc Sensor (FreeStyle Dori 2 Sensor) misc, , Disp: , Rfl:   •  O2 (OXYGEN), Inhale 2 L/min 1 (One) Time., Disp: , Rfl:   •  OLANZapine (ZyPREXA) 5 MG tablet, Take 1 tablet by mouth Every Night. Take on days 2, 3 and 4 after chemotherapy., Disp: 3 tablet, Rfl: 5  •  pantoprazole (PROTONIX) 20 MG EC tablet, , Disp: , Rfl:   •  pantoprazole (PROTONIX) 40 MG EC tablet, Take 40 mg by mouth Daily., Disp: , Rfl:   •  saccharomyces boulardii (FLORASTOR) 250 MG capsule, Take 250 mg by mouth 2 (Two) Times a Day., Disp: , Rfl:   •  senna (SENOKOT) 8.6 MG tablet tablet, Take  by mouth Every Night., Disp: , Rfl:   No current facility-administered medications for this visit.    Facility-Administered Medications Ordered in Other Visits:   •  heparin injection 500 Units, 500 Units, Intravenous, PRN, Jelly Montalvo, DO, 500 Units at 02/03/23 0908  •  sodium chloride 0.9 % flush 10 mL, 10 mL, Intravenous, PRN, Jelly Montalvo, DO, 10 mL at 02/03/23 0908    ALLERGIES:  Allergies   Allergen Reactions   • Beta Adrenergic Blockers Other (See Comments)      Make PAD pain / claudication worse         ROS:  As listed above, otherwise negative.      PHYSICAL EXAM:  Vitals:    02/17/23 0832   BP: 152/85   Pulse: 74   Resp: 12   Temp: 97.6 °F (36.4 °C)   TempSrc: Temporal   SpO2: 98%   PainSc: 0-No pain       There is no height or weight on file to calculate BMI.    Current Status 2/17/2023   ECOG score 1     PHQ-9 Total Score:           GEN: alert, normal affect, in no acute distress, in wheelchair  GYN: deferred      Exam unchanged from prior visit. Lorelei Nieto PA-C        Result Review :  The pertinent labs, images, and/or pathology as noted in the oncology history were reviewed independently and discussed with the patient.   Lorelei Nieto PA-C   01/23/2023      [facility]:   WBC   Date Value Ref Range Status   02/17/2023 11.28 (H) 3.40 - 10.80 10*3/mm3 Final   06/12/2020 7.0 3.7 - 10.3 x10(3)/mcL Final     RBC   Date Value Ref Range Status   02/17/2023 3.97 3.77 - 5.28 10*6/mm3 Final   06/12/2020 4.91 3.90 - 5.20 x10(6)/mcL Final     Hemoglobin   Date Value Ref Range Status   02/17/2023 13.0 12.0 - 15.9 g/dL Final   06/12/2020 14.9 11.2 - 15.7 g/dL Final     Hematocrit   Date Value Ref Range Status   02/17/2023 41.1 34.0 - 46.6 % Final   06/12/2020 46.9 (H) 34.0 - 45.0 % Final     Platelets   Date Value Ref Range Status   02/17/2023 154 140 - 450 10*3/mm3 Final   06/12/2020 184 155 - 369 x10(3)/mcL Final        Date Value Ref Range Status   02/03/2023 30.5 0.0 - 38.1 U/mL Final       [facility]:  CT Chest With Contrast Diagnostic    Result Date: 11/29/2022  1. No new findings to suggest metastatic disease in the chest. 2. Old healed granulomatous disease. 3. Advanced atherosclerotic changes. Abdomen and pelvis CT dictated separately. Signer Name: Jesse Irby MD  Signed: 11/29/2022 8:26 AM  Workstation Name: [email or web address]  [facility]    CT Abdomen Pelvis With Contrast    Result Date: 11/29/2022  1. Interval response to therapy. Significant  interval regression of abdominal and pelvic ascites with loculated features and associated peritoneal thickening and abnormal enhancement. Interval decrease in retroperitoneal adenopathy. No new evidence of solid organ metastasis. 2. Dilated porcelain gallbladder with a rind of persistent soft tissue thickening similar to the prior study. No new biliary ductal dilatation. 3. The ovaries appear less prominent and demonstrate interval decrease in abnormal enhancement and cystic change. 4. Advanced atherosclerotic change. Intermittent and/or absence of flow within the bilateral iliac systems. Evidence of prior vascular surgery. 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   • Deemed unacceptable surgical risk due to diffuse triple vessel disease and no opitons for PCI or surgical revascularization. Dr Arturo Vasquez is her cardiologist   • Uncontrolled DM - A1C 11 -  17U humalog AC, Lantus 68U BID   - Pt states she is taking DM medication as prescribed, glucose pending today  • Porcelain gallbladder   • CAD - on ASA and statin   • HTN - improving, under supervision of PCP, unsure what she is taking  • Peripheral vascular disease- post R BKA / L AKA    • Failed cardiac clearance - high risk with non modifiable factors, diffuse triple vessel coronary artery disease , no options for PCI or surgical revascularization  • Noncomplaince affecting her care   • Difficulty coping            PLAN :  - BP controlled today, CBC and CMP okay; okay for infusion  - emphasized the importance of taking BP medication (Coreg BID?)   - will see back in office for KARY labs  -  all questions and concerns answered              Gynecologic Oncology   [facility] [place] [place] [id number] [phone] office

## 2023-02-17 NOTE — NURSING NOTE
Weighed pt in bed today. Pt 135.5 lbs. Pt has had a significant weight loss compared to when she started on chemo. Pt has not been weighed that past few times she has been in. New weight placed in chart. Made pharmacy aware of weight change. Pharmacy contacting MD/PA to see if they want to update plan to reflect new weight.      Spoke with RE Nieto, who is okay with adjusting dosing based on pt's current weight. Pharmacy adjusting dosing in tx plan to reflect changes.

## 2023-03-08 ENCOUNTER — TELEPHONE (OUTPATIENT)
Dept: GYNECOLOGIC ONCOLOGY | Age: 63
End: 2023-03-08

## 2023-03-08 NOTE — TELEPHONE ENCOUNTER
Caller: Joanne Contreras    Relationship: Self    Best call back number: [phone]    What is the best time to reach you: ANYTIME    Who are you requesting to speak with (clinical staff, provider, specific staff member): ARCADIO    What was the call regarding: PT STATED SHE THOUGHT ARCADIO WAS SUPPOSED TO BE WORKING ON SCHEDULING AN APPT FOR HER. PLEASE HAVE ARCADIO CALL PT WITH AN UPDATE.     Do you require a callback: YES

## 2023-03-14 ENCOUNTER — PATIENT OUTREACH (OUTPATIENT)
Dept: OTHER | Facility: HOSPITAL | Age: 63
End: 2023-03-14
Payer: MEDICARE

## 2023-03-14 DIAGNOSIS — C56.9 MALIGNANT NEOPLASM OF OVARY, UNSPECIFIED LATERALITY: Primary | ICD-10-CM

## 2023-03-16 DIAGNOSIS — C57.00 FALLOPIAN TUBE CARCINOMA, UNSPECIFIED LATERALITY: Primary | ICD-10-CM

## 2023-03-17 ENCOUNTER — INFUSION (OUTPATIENT)
Dept: ONCOLOGY | Facility: HOSPITAL | Age: 63
End: 2023-03-17
Payer: MEDICARE

## 2023-03-17 ENCOUNTER — OFFICE VISIT (OUTPATIENT)
Dept: GYNECOLOGIC ONCOLOGY | Facility: CLINIC | Age: 63
End: 2023-03-17
Payer: MEDICARE

## 2023-03-17 VITALS
RESPIRATION RATE: 16 BRPM | DIASTOLIC BLOOD PRESSURE: 77 MMHG | TEMPERATURE: 96.5 F | HEART RATE: 72 BPM | SYSTOLIC BLOOD PRESSURE: 158 MMHG | OXYGEN SATURATION: 98 %

## 2023-03-17 VITALS — BODY MASS INDEX: 32.36 KG/M2 | WEIGHT: 134.2 LBS

## 2023-03-17 DIAGNOSIS — C57.00 FALLOPIAN TUBE CARCINOMA, UNSPECIFIED LATERALITY: Primary | ICD-10-CM

## 2023-03-17 DIAGNOSIS — Z45.9 ENCOUNTER FOR MANAGEMENT OF IMPLANTED DEVICE: ICD-10-CM

## 2023-03-17 DIAGNOSIS — C57.00 FALLOPIAN TUBE CARCINOMA, UNSPECIFIED LATERALITY: ICD-10-CM

## 2023-03-17 LAB
ALBUMIN SERPL-MCNC: 3.6 G/DL (ref 3.5–5.2)
ALBUMIN/GLOB SERPL: 1.1 G/DL (ref 1.1–2.4)
ALP SERPL-CCNC: 100 U/L (ref 38–116)
ALT SERPL W P-5'-P-CCNC: 23 U/L (ref 0–33)
ANION GAP SERPL CALCULATED.3IONS-SCNC: 10.7 MMOL/L (ref 5–15)
AST SERPL-CCNC: 26 U/L (ref 0–32)
BASOPHILS # BLD AUTO: 0.05 10*3/MM3 (ref 0–0.2)
BASOPHILS NFR BLD AUTO: 0.8 % (ref 0–1.5)
BILIRUB SERPL-MCNC: <0.2 MG/DL (ref 0.2–1.2)
BUN SERPL-MCNC: 12 MG/DL (ref 6–20)
BUN/CREAT SERPL: 20 (ref 7.3–30)
CALCIUM SPEC-SCNC: 9.7 MG/DL (ref 8.5–10.2)
CANCER AG125 SERPL QL: 28.2 U/ML (ref 0–38.1)
CHLORIDE SERPL-SCNC: 105 MMOL/L (ref 98–107)
CO2 SERPL-SCNC: 25.3 MMOL/L (ref 22–29)
CREAT SERPL-MCNC: 0.6 MG/DL (ref 0.6–1.1)
DEPRECATED RDW RBC AUTO: 55.5 FL (ref 37–54)
EGFRCR SERPLBLD CKD-EPI 2021: 101.6 ML/MIN/1.73
EOSINOPHIL # BLD AUTO: 0.12 10*3/MM3 (ref 0–0.4)
EOSINOPHIL NFR BLD AUTO: 2 % (ref 0.3–6.2)
ERYTHROCYTE [DISTWIDTH] IN BLOOD BY AUTOMATED COUNT: 14.5 % (ref 12.3–15.4)
GLOBULIN UR ELPH-MCNC: 3.3 GM/DL (ref 1.8–3.5)
GLUCOSE SERPL-MCNC: 237 MG/DL (ref 74–124)
HCT VFR BLD AUTO: 39.3 % (ref 34–46.6)
HGB BLD-MCNC: 12.6 G/DL (ref 12–15.9)
IMM GRANULOCYTES # BLD AUTO: 0.02 10*3/MM3 (ref 0–0.05)
IMM GRANULOCYTES NFR BLD AUTO: 0.3 % (ref 0–0.5)
LYMPHOCYTES # BLD AUTO: 2.04 10*3/MM3 (ref 0.7–3.1)
LYMPHOCYTES NFR BLD AUTO: 33.6 % (ref 19.6–45.3)
MCH RBC QN AUTO: 33.2 PG (ref 26.6–33)
MCHC RBC AUTO-ENTMCNC: 32.1 G/DL (ref 31.5–35.7)
MCV RBC AUTO: 103.4 FL (ref 79–97)
MONOCYTES # BLD AUTO: 0.62 10*3/MM3 (ref 0.1–0.9)
MONOCYTES NFR BLD AUTO: 10.2 % (ref 5–12)
NEUTROPHILS NFR BLD AUTO: 3.22 10*3/MM3 (ref 1.7–7)
NEUTROPHILS NFR BLD AUTO: 53.1 % (ref 42.7–76)
NRBC BLD AUTO-RTO: 0 /100 WBC (ref 0–0.2)
PLATELET # BLD AUTO: 177 10*3/MM3 (ref 140–450)
PMV BLD AUTO: 10.1 FL (ref 6–12)
POTASSIUM SERPL-SCNC: 3.8 MMOL/L (ref 3.5–4.7)
PROT SERPL-MCNC: 6.9 G/DL (ref 6.3–8)
RBC # BLD AUTO: 3.8 10*6/MM3 (ref 3.77–5.28)
SODIUM SERPL-SCNC: 141 MMOL/L (ref 134–145)
WBC NRBC COR # BLD: 6.07 10*3/MM3 (ref 3.4–10.8)

## 2023-03-17 PROCEDURE — 85025 COMPLETE CBC W/AUTO DIFF WBC: CPT

## 2023-03-17 PROCEDURE — 96417 CHEMO IV INFUS EACH ADDL SEQ: CPT

## 2023-03-17 PROCEDURE — 96415 CHEMO IV INFUSION ADDL HR: CPT

## 2023-03-17 PROCEDURE — 3078F DIAST BP <80 MM HG: CPT | Performed by: PHYSICIAN ASSISTANT

## 2023-03-17 PROCEDURE — 25010000002 CARBOPLATIN PER 50 MG: Performed by: PHYSICIAN ASSISTANT

## 2023-03-17 PROCEDURE — 25010000002 DEXAMETHASONE SODIUM PHOSPHATE 100 MG/10ML SOLUTION 10 ML VIAL: Performed by: PHYSICIAN ASSISTANT

## 2023-03-17 PROCEDURE — 96413 CHEMO IV INFUSION 1 HR: CPT

## 2023-03-17 PROCEDURE — 25010000002 HEPARIN LOCK FLUSH PER 10 UNITS: Performed by: OBSTETRICS & GYNECOLOGY

## 2023-03-17 PROCEDURE — 96367 TX/PROPH/DG ADDL SEQ IV INF: CPT

## 2023-03-17 PROCEDURE — 63710000001 OLANZAPINE 5 MG TABLET: Performed by: PHYSICIAN ASSISTANT

## 2023-03-17 PROCEDURE — 86304 IMMUNOASSAY TUMOR CA 125: CPT | Performed by: OBSTETRICS & GYNECOLOGY

## 2023-03-17 PROCEDURE — 25010000002 PACLITAXEL PER 1 MG: Performed by: PHYSICIAN ASSISTANT

## 2023-03-17 PROCEDURE — 25010000002 DIPHENHYDRAMINE PER 50 MG: Performed by: PHYSICIAN ASSISTANT

## 2023-03-17 PROCEDURE — 1159F MED LIST DOCD IN RCRD: CPT | Performed by: PHYSICIAN ASSISTANT

## 2023-03-17 PROCEDURE — 25010000002 PALONOSETRON PER 25 MCG: Performed by: PHYSICIAN ASSISTANT

## 2023-03-17 PROCEDURE — 99214 OFFICE O/P EST MOD 30 MIN: CPT | Performed by: PHYSICIAN ASSISTANT

## 2023-03-17 PROCEDURE — 96375 TX/PRO/DX INJ NEW DRUG ADDON: CPT

## 2023-03-17 PROCEDURE — 80053 COMPREHEN METABOLIC PANEL: CPT

## 2023-03-17 PROCEDURE — 1160F RVW MEDS BY RX/DR IN RCRD: CPT | Performed by: PHYSICIAN ASSISTANT

## 2023-03-17 PROCEDURE — 25010000002 FOSAPREPITANT PER 1 MG: Performed by: PHYSICIAN ASSISTANT

## 2023-03-17 PROCEDURE — 1126F AMNT PAIN NOTED NONE PRSNT: CPT | Performed by: PHYSICIAN ASSISTANT

## 2023-03-17 PROCEDURE — 3077F SYST BP >= 140 MM HG: CPT | Performed by: PHYSICIAN ASSISTANT

## 2023-03-17 PROCEDURE — A9270 NON-COVERED ITEM OR SERVICE: HCPCS | Performed by: PHYSICIAN ASSISTANT

## 2023-03-17 RX ORDER — HEPARIN SODIUM (PORCINE) LOCK FLUSH IV SOLN 100 UNIT/ML 100 UNIT/ML
500 SOLUTION INTRAVENOUS AS NEEDED
Status: DISCONTINUED | OUTPATIENT
Start: 2023-03-17 | End: 2023-03-17 | Stop reason: HOSPADM

## 2023-03-17 RX ORDER — OLANZAPINE 5 MG/1
5 TABLET ORAL ONCE
Status: COMPLETED | OUTPATIENT
Start: 2023-03-17 | End: 2023-03-17

## 2023-03-17 RX ORDER — FAMOTIDINE 10 MG/ML
20 INJECTION, SOLUTION INTRAVENOUS AS NEEDED
Status: CANCELLED | OUTPATIENT
Start: 2023-03-17

## 2023-03-17 RX ORDER — SODIUM CHLORIDE 0.9 % (FLUSH) 0.9 %
10 SYRINGE (ML) INJECTION AS NEEDED
Status: DISCONTINUED | OUTPATIENT
Start: 2023-03-17 | End: 2023-03-17 | Stop reason: HOSPADM

## 2023-03-17 RX ORDER — FAMOTIDINE 10 MG/ML
20 INJECTION, SOLUTION INTRAVENOUS ONCE
Status: COMPLETED | OUTPATIENT
Start: 2023-03-17 | End: 2023-03-17

## 2023-03-17 RX ORDER — DIPHENHYDRAMINE HYDROCHLORIDE 50 MG/ML
50 INJECTION INTRAMUSCULAR; INTRAVENOUS AS NEEDED
Status: CANCELLED | OUTPATIENT
Start: 2023-03-17

## 2023-03-17 RX ORDER — PALONOSETRON 0.05 MG/ML
0.25 INJECTION, SOLUTION INTRAVENOUS ONCE
Status: COMPLETED | OUTPATIENT
Start: 2023-03-17 | End: 2023-03-17

## 2023-03-17 RX ORDER — HEPARIN SODIUM (PORCINE) LOCK FLUSH IV SOLN 100 UNIT/ML 100 UNIT/ML
500 SOLUTION INTRAVENOUS AS NEEDED
OUTPATIENT
Start: 2023-03-17

## 2023-03-17 RX ORDER — FAMOTIDINE 10 MG/ML
20 INJECTION, SOLUTION INTRAVENOUS ONCE
Status: CANCELLED | OUTPATIENT
Start: 2023-03-17

## 2023-03-17 RX ORDER — SODIUM CHLORIDE 0.9 % (FLUSH) 0.9 %
10 SYRINGE (ML) INJECTION AS NEEDED
OUTPATIENT
Start: 2023-03-17

## 2023-03-17 RX ORDER — SODIUM CHLORIDE 9 MG/ML
250 INJECTION, SOLUTION INTRAVENOUS ONCE
Status: COMPLETED | OUTPATIENT
Start: 2023-03-17 | End: 2023-03-17

## 2023-03-17 RX ADMIN — PALONOSETRON 0.25 MG: 0.05 INJECTION, SOLUTION INTRAVENOUS at 10:46

## 2023-03-17 RX ADMIN — OLANZAPINE 5 MG: 5 TABLET, FILM COATED ORAL at 10:45

## 2023-03-17 RX ADMIN — DIPHENHYDRAMINE HYDROCHLORIDE 50 MG: 50 INJECTION, SOLUTION INTRAMUSCULAR; INTRAVENOUS at 10:58

## 2023-03-17 RX ADMIN — FOSAPREPITANT 100 ML: 150 INJECTION, POWDER, LYOPHILIZED, FOR SOLUTION INTRAVENOUS at 11:17

## 2023-03-17 RX ADMIN — CARBOPLATIN 480 MG: 10 INJECTION, SOLUTION INTRAVENOUS at 14:53

## 2023-03-17 RX ADMIN — SODIUM CHLORIDE 250 ML: 9 INJECTION, SOLUTION INTRAVENOUS at 10:36

## 2023-03-17 RX ADMIN — FAMOTIDINE 20 MG: 10 INJECTION INTRAVENOUS at 10:40

## 2023-03-17 RX ADMIN — Medication 500 UNITS: at 15:30

## 2023-03-17 RX ADMIN — Medication 10 ML: at 15:30

## 2023-03-17 RX ADMIN — PACLITAXEL 260 MG: 6 INJECTION, SOLUTION INTRAVENOUS at 11:50

## 2023-03-17 RX ADMIN — DEXAMETHASONE SODIUM PHOSPHATE 20 MG: 10 INJECTION, SOLUTION INTRAMUSCULAR; INTRAVENOUS at 10:40

## 2023-03-17 NOTE — PROGRESS NOTES
Age: 63 y.o.  Sex: female  :  1960  MRN: [id number]              Joanne Contreras presents to [facility] for evaluation prior to C6 Carboplatin/Paclitaxel.   Overall feeling well.  No new complaints.  Denies pain.  Tolerating diet without issue.  No N/V.  Some constipation reported, though not new for pt.  States she had a BM yesterday after taking a laxative.  No urinary complaints.   Pt states she is taking Coreg BID at home for BP.  States her BPs have been running around 118-120/70s.            Oncology/Hematology History Overview Note   • 22: CT A/P - Findings suspicious for peritoneal carcinomatosis with ascites and metastatic disease to both ovaries. Recommend diagnostic paracentesis for cytology. Also noted is a porcelain gallbladder and pancreatic atrophy.  • 22: Paracentesis - rare atypical cells suspicious but not diagnostic for carcinoma. Reactive mesothelium & lymphocytes.   • 22: Pelvic US - The bilateral ovaries appear slightly enlarged and heterogeneous but no dominant adnexal masses are seen.  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   • 22: Paracentesis - removed 3400 mL of dark katiana fluid   • 22-3/17/23: 6 cycles Paclitaxel 175 mg/m2  Carboplatin AUC = 6   • 22: CT C/A/P - Interval response to therapy. Significant interval regression of abdominal and pelvic ascites with loculated features and associated peritoneal thickening and abnormal enhancement. Interval decrease in retroperitoneal adenopathy. No new evidence of solid organ metastasis. Dilated porcelain gallbladder with a rind of persistent soft tissue thickening similar to the prior study. No new biliary ductal dilatation. The ovaries appear less prominent and demonstrate interval decrease in abnormal enhancement and cystic change. Advanced atherosclerotic change. Intermittent and/or absence of flow within the  bilateral iliac systems. Evidence of prior vascular surgery.        3/17/23: C6 Carbo/Taxol      CURRENT TREATMENT:  • 9/28/22: CYCLE 1 - Paclitaxel 175 mg/m2  Carboplatin AUC = 6   • 10/19/22: CYCLE 2 - Paclitaxel 175 mg/m2  Carboplatin AUC = 6   ANC: 1.02  • 11/9/22: CYCLE 3 - Paclitaxel 175 mg/m2  Carboplatin AUC = 6   • 1/13/23: CYCLE 4 - Paclitaxel 175 mg/m2  Carboplatin AUC = 6            2/3/23: C5 held due to uncontrolled hypertension  • 2/17/23: CYCLE 5 - Paclitaxel 175 mg/m2  Carboplatin AUC = 6   • 3/17/23: CYCLE 6 - Paclitaxel 175 mg/m2  Carboplatin AUC = 6      Fallopian tube carcinoma, unspecified laterality (HCC)   9/19/2022 -  Other Event    CA 19-9: Less than 2.0      Other Event    CA-125  2/3/2023 30.5   1/13/2023 43.9   11/30/2022 70.2   10/26/2022 1182   10/19/2022 2059   9/19/2022 6024            Past Medical History:  Past Medical History:   Diagnosis Date   • Anemia    • Coronary artery disease    • Diabetes mellitus (HCC)    • Hyperlipidemia    • Hypertension    • Peripheral vascular disease, secondary (HCC)    • Stroke (HCC) 2022       Past Surgical History:  Past Surgical History:   Procedure Laterality Date   • ABOVE KNEE AMPUTATION Left    • BELOW KNEE AMPUTATION Right    • TOE SURGERY     • VENOUS ACCESS DEVICE (PORT) INSERTION Right 9/28/2022    Procedure: INSERTION VENOUS ACCESS DEVICE;  Surgeon: Adama Barlow MD;  Location: [facility];  Service: General;  Laterality: Right;        MEDICATIONS:    Current Outpatient Medications:   •  aspirin 81 MG chewable tablet, Chew 1 tablet Daily., Disp: , Rfl:   •  carvedilol (COREG) 3.125 MG tablet, Take 1 tablet by mouth 2 (Two) Times a Day With Meals., Disp: , Rfl:   •  gabapentin (NEURONTIN) 300 MG capsule, Take 1 capsule by mouth 3 (Three) Times a Day. (Patient taking differently: Take 1 capsule by mouth As Needed.), Disp: 90 capsule, Rfl: 3  •  insulin aspart (novoLOG) 100 UNIT/ML injection, Inject 4 Units under the skin into  the appropriate area as directed 3 (Three) Times a Day Before Meals., Disp: , Rfl:   •  Insulin Glargine (LANTUS SC), Inject 20 Units under the skin 2 (Two) Times a Day., Disp: , Rfl:   •  Insulin Lispro, 1 Unit Dial, (HUMALOG) 100 UNIT/ML solution pen-injector, , Disp: , Rfl:   •  ondansetron (ZOFRAN) 4 MG tablet, Take 1 tablet by mouth Every 8 (Eight) Hours As Needed for Nausea or Vomiting., Disp: , Rfl:   •  ondansetron (ZOFRAN) 8 MG tablet, Take 1 tablet by mouth 3 (Three) Times a Day As Needed for Nausea or Vomiting., Disp: 30 tablet, Rfl: 5  •  senna (SENOKOT) 8.6 MG tablet tablet, Take  by mouth Every Night., Disp: , Rfl:   •  acetaminophen (TYLENOL) 325 MG tablet, Take 650 mg by mouth Every 6 (Six) Hours As Needed for Mild Pain (1-3). (Patient not taking: Reported on 3/17/2023), Disp: , Rfl:   •  atorvastatin (LIPITOR) 20 MG tablet, Take 20 mg by mouth Daily. (Patient not taking: Reported on 3/17/2023), Disp: , Rfl:   •  Continuous Blood Gluc Sensor (FreeStyle Dori 2 Sensor) misc, , Disp: , Rfl:   •  docusate sodium (COLACE) 100 MG capsule, Take 100 mg by mouth 2 (Two) Times a Day. (Patient not taking: Reported on 3/17/2023), Disp: , Rfl:   •  Multiple Vitamins-Minerals (MULTIVITAMIN WITH MINERALS) tablet tablet, Take 1 tablet by mouth Daily. (Patient not taking: Reported on 3/17/2023), Disp: , Rfl:   •  O2 (OXYGEN), Inhale 2 L/min 1 (One) Time. (Patient not taking: Reported on 3/17/2023), Disp: , Rfl:   •  OLANZapine (ZyPREXA) 5 MG tablet, Take 1 tablet by mouth Every Night. Take on days 2, 3 and 4 after chemotherapy. (Patient not taking: Reported on 3/17/2023), Disp: 3 tablet, Rfl: 5  •  pantoprazole (PROTONIX) 20 MG EC tablet, , Disp: , Rfl:   •  pantoprazole (PROTONIX) 40 MG EC tablet, Take 40 mg by mouth Daily. (Patient not taking: Reported on 3/17/2023), Disp: , Rfl:   •  saccharomyces boulardii (FLORASTOR) 250 MG capsule, Take 250 mg by mouth 2 (Two) Times a Day. (Patient not taking: Reported on  3/17/2023), Disp: , Rfl:   No current facility-administered medications for this visit.    Facility-Administered Medications Ordered in Other Visits:   •  heparin injection 500 Units, 500 Units, Intravenous, PRN, Takimoto, Jelly L, DO, 500 Units at 02/03/23 0908  •  sodium chloride 0.9 % flush 10 mL, 10 mL, Intravenous, PRN, Takimoto, Jelly L, DO, 10 mL at 02/03/23 0908    ALLERGIES:  Allergies   Allergen Reactions   • Beta Adrenergic Blockers Other (See Comments)     Make PAD pain / claudication worse         ROS:  As listed above, otherwise negative.      PHYSICAL EXAM:  Vitals:    03/17/23 0909   BP: 158/77   Pulse: 72   Resp: 16   Temp: 96.5 °F (35.8 °C)   TempSrc: Temporal   SpO2: 98%   Weight: Comment: unable to obtain   Height: Comment: unable to obtain   PainSc: 0-No pain       There is no height or weight on file to calculate BMI.    Current Status 3/17/2023   ECOG score 2     PHQ-9 Total Score:           GEN: alert, normal affect, in no acute distress, in wheelchair  GYN: deferred    No change to exam today. Lorelei Nieto PA-C        Result Review :  The pertinent labs, images, and/or pathology as noted in the oncology history were reviewed independently and discussed with the patient.   Lorelei Nieto PA-C   01/23/2023      [facility] LABS:   WBC   Date Value Ref Range Status   03/17/2023 6.07 3.40 - 10.80 10*3/mm3 Final   06/12/2020 7.0 3.7 - 10.3 x10(3)/mcL Final     RBC   Date Value Ref Range Status   03/17/2023 3.80 3.77 - 5.28 10*6/mm3 Final   06/12/2020 4.91 3.90 - 5.20 x10(6)/mcL Final     Hemoglobin   Date Value Ref Range Status   03/17/2023 12.6 12.0 - 15.9 g/dL Final   06/12/2020 14.9 11.2 - 15.7 g/dL Final     Hematocrit   Date Value Ref Range Status   03/17/2023 39.3 34.0 - 46.6 % Final   06/12/2020 46.9 (H) 34.0 - 45.0 % Final     Platelets   Date Value Ref Range Status   03/17/2023 177 140 - 450 10*3/mm3 Final   06/12/2020 184 155 - 369 x10(3)/mcL Final        Date Value Ref Range Status    02/17/2023 29.3 0.0 - 38.1 U/mL Final       [facility]:  No radiology results for the last 90 days.          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   • Deemed unacceptable surgical risk due to diffuse triple vessel disease and no opitons for PCI or surgical revascularization. Dr Arturo Vasquez is her cardiologist   • Uncontrolled DM - A1C 11 -  17U humalog AC, Lantus 68U BID   - Pt states she is taking DM medication as prescribed, glucose pending today  • Porcelain gallbladder   • CAD - on ASA and statin   • HTN - improving, under supervision of PCP, Coreg BID  • Peripheral vascular disease- post R BKA / L AKA    • Failed cardiac clearance - high risk with non modifiable factors, diffuse triple vessel coronary artery disease , no options for PCI or surgical revascularization  • Noncomplaince affecting her care   • Difficulty coping            PLAN :  - /77 today, CBC reviewed and acceptable for infusion; okay for chemo, pending CMP  - emphasized the importance of taking BP medication (Coreg BID); states she checks Bps regularly at home and have been within the normal range  - will see back in office for KARY labs  - CT in approximately two weeks, with follow up in office with Dr. Montalvo to go over results  - all questions and concerns answered      Dr Arturo Vasquez, cardiologist               Gynecologic Oncology   [address] [place] [address] [address] [place] [address]  [phone] office

## 2023-03-30 ENCOUNTER — HOSPITAL ENCOUNTER (OUTPATIENT)
Dept: CT IMAGING | Facility: HOSPITAL | Age: 63
Discharge: HOME | End: 2023-03-30
Payer: MEDICARE

## 2023-03-31 ENCOUNTER — PATIENT OUTREACH (OUTPATIENT)
Dept: OTHER | Facility: CLINIC | Age: 63
End: 2023-03-31
Payer: MEDICARE

## 2023-03-31 ENCOUNTER — TELEPHONE (OUTPATIENT)
Dept: GYNECOLOGIC ONCOLOGY | Facility: CLINIC | Age: 63
End: 2023-03-31
Payer: MEDICARE

## 2023-03-31 DIAGNOSIS — C57.00 FALLOPIAN TUBE CARCINOMA, UNSPECIFIED LATERALITY: Primary | ICD-10-CM

## 2023-03-31 NOTE — PROGRESS NOTES
Nurse Initial Navigator Assessment: Referral received from Lorelei/PA (Dr. Montalvo) to see patient regarding diagnosis and psychosocial support. Call placed to patient to complete initial assessment. Introduced self and explained role. 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 States she completed chemotherapy on 3/17/23 (Carboplatin/Taxol x6 cycles). Says she missed her CT scan appointment yesterday because of transportation issues. Message sent to Adrienne/Dr. Montalvo’s office to inform. CT scan re-scheduled for 4/10/23 and f/u with Dr. Montalvo on 4/12/23 to review results and discuss future plans. Patient able to verbalize understanding regarding plan of care and treatment plan. No additional concerns or needs voiced.    Patient lives alone in [place], [place]. States she doesn’t have a good support system. Reports difficulty with transportation or obtaining medications at times. Uses [employer] to get to medical appointments and says medications are covered by insurance. However, she does have difficulty picking up prescriptions from the pharmacy and arranging transportation to run errands. Will get referral for  to assist. Patient is alert & oriented. She is independent with ADLs and uses a wheelchair for mobility. Patient is a bilateral BKA. States she does have prosthetic limbs but they do not fit properly. Mood and affect within normal limits. Denies feeling anxious or depressed at this time. No additional concerns voiced or psychosocial needs noted.   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 N/A  Social History  Support Community:  No  Substance Use: Smoking use--former/quit in 2015; ETOH use---denies; Drug use---denies  Family Psychiatric: Unknown  Impactful cancer experience: father---lung cancer  Plan of Care:  Discussed support services offered at [facility] and in the community. Patient does not have a living will and says copy is on file. Provided navigation letter; advanced care planning; NCCN patient guidelines for ovarian cancer; [employer]; [employer] and online resources. No additional needs noted at this time. Encouraged patient to call with any questions or concerns. Will continue to follow.....Dione Contreras RN, Oncology Nurse Navigator

## 2023-04-05 ENCOUNTER — SPECIALTY PHARMACY (OUTPATIENT)
Dept: ENDOCRINOLOGY | Age: 63
End: 2023-04-05
Payer: MEDICARE

## 2023-04-05 ENCOUNTER — OFFICE VISIT (OUTPATIENT)
Dept: ENDOCRINOLOGY | Age: 63
End: 2023-04-05
Payer: MEDICARE

## 2023-04-05 VITALS
BODY MASS INDEX: 32.31 KG/M2 | SYSTOLIC BLOOD PRESSURE: 138 MMHG | TEMPERATURE: 96.8 F | OXYGEN SATURATION: 97 % | DIASTOLIC BLOOD PRESSURE: 80 MMHG | HEART RATE: 71 BPM | WEIGHT: 134 LBS

## 2023-04-05 DIAGNOSIS — Z79.4 TYPE 2 DIABETES MELLITUS WITH HYPERGLYCEMIA, WITH LONG-TERM CURRENT USE OF INSULIN: Primary | ICD-10-CM

## 2023-04-05 DIAGNOSIS — E11.65 TYPE 2 DIABETES MELLITUS WITH HYPERGLYCEMIA, WITH LONG-TERM CURRENT USE OF INSULIN: Primary | ICD-10-CM

## 2023-04-05 PROCEDURE — G2212 PROLONG OUTPT/OFFICE VIS: HCPCS | Performed by: INTERNAL MEDICINE

## 2023-04-05 PROCEDURE — 3079F DIAST BP 80-89 MM HG: CPT | Performed by: INTERNAL MEDICINE

## 2023-04-05 PROCEDURE — 3075F SYST BP GE 130 - 139MM HG: CPT | Performed by: INTERNAL MEDICINE

## 2023-04-05 PROCEDURE — 99205 OFFICE O/P NEW HI 60 MIN: CPT | Performed by: INTERNAL MEDICINE

## 2023-04-05 RX ORDER — INSULIN LISPRO 100 [IU]/ML
15 INJECTION, SOLUTION INTRAVENOUS; SUBCUTANEOUS
Qty: 90 ML | Refills: 3 | Status: SHIPPED | OUTPATIENT
Start: 2023-04-05

## 2023-04-05 RX ORDER — INSULIN GLARGINE 100 [IU]/ML
INJECTION, SOLUTION SUBCUTANEOUS
Qty: 30 ML | Refills: 2 | Status: SHIPPED | OUTPATIENT
Start: 2023-04-05

## 2023-04-05 RX ORDER — PEN NEEDLE, DIABETIC 32GX 5/32"
1 NEEDLE, DISPOSABLE MISCELLANEOUS 4 TIMES DAILY
Qty: 400 EACH | Status: SHIPPED | OUTPATIENT
Start: 2023-04-05

## 2023-04-05 NOTE — PROGRESS NOTES
"Chief Complaint  Establish Care (Type 2 DM)      HPI:  Joanne Contreras presents to [facility] for eval  Type 2 DM.   Hx DM since early 1990s.  Initially on OHAs. Doesn't think ever well-controlled.   Started insulin when hospitalized at some point, not sure what year.   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 Can't drive, pharmacy doesn't deliver, can't get there, people went to  and it wasn't ready.   Had been in nursing home: dose went up to 40 BID and Humalog 15 with meals  Glucoses as low as 70s in NH, no hypos  \"I took it upon myself to stop taking that much\"  Lantus: taking 30 QHS  Novolog not taking unless eating a high carb meal or if high glucose      Known Complications:  Neuro: hands  Renal: +protein ; not on ACE/ARB- pt not sure. On lisinopril in past  CV: s/p L AKA and R BKA ; diffuse CAD  Ophtho: past due        Lab Results   Component Value Date    HGBA1C 8.40 (H) 09/19/2022                 Medical Records Reviewed:     Latest Reference Range & Units 06/12/20 12:13 07/29/22 12:58 07/30/22 02:03 09/19/22 03:00   Hemoglobin A1C 4.80 - 5.60 % 9.1 (H) (E) 8.2 (H) (E) 8.2 (H) (E) 8.40 (H)   (H): Data is abnormally high  (E): External lab result      Lab Results   Component Value Date    GLUCOSE 237 (H) 03/17/2023    BUN 12 03/17/2023    CREATININE 0.60 03/17/2023    EGFR 101.6 03/17/2023    BCR 20.0 03/17/2023    K 3.8 03/17/2023    CO2 25.3 03/17/2023    CALCIUM 9.7 03/17/2023    ALBUMIN 3.6 03/17/2023    BILITOT <0.2 (L) 03/17/2023    AST 26 03/17/2023    ALT 23 03/17/2023     CT abd 11/28/22  FINDINGS:  Abdomen: CT chest dictated " separately. Resolution of bilateral effusions. There is advanced atherosclerotic change of the aorta. No aneurysm. Advanced atherosclerotic change of the iliac systems bilaterally. The spleen and adrenal glands are negative and  the pancreas is unremarkable. The kidneys are nonobstructed. There is a left renal cyst. There is hepatic steatosis. No new hepatic lesion. The gallbladder is dilated up to 5 cm transverse. There is circumferential calcification of the gallbladder wall  in keeping with a porcelain gallbladder.     When compared with the prior study, significant interval reduction in overall volume of ascites in the abdomen and pelvis. Soft tissue thickening associated with the inferior margin of the gallbladder remains present, measuring up to 1.5 cm, not  significantly changed. Trace amount of partially loculated ascites adjacent to the inferior margin of the right hepatic lobe is present. Marginal enhancement in this area has significantly decreased. Shotty retroperitoneal adenopathy remains present. An  enlarged lymph node cephalad to the left renal vein on the prior study is smaller and barely discernible, measuring about 5 mm short axis, previously 1.3 cm. Peritoneal thickening and omental disease in the upper abdomen best demonstrated to the left of  midline is less conspicuous. Abnormal enhancement and peritoneal thickening to the left of midline extending into the small bowel mesentery has also significantly regressed.     Pelvis: The bladder is negative. Trace free fluid in the pelvis. There is no bowel obstruction or focal inflammatory change of the bowel. Appendix not clearly identified but no secondary sign of appendicitis. Abnormal enhancement and cystic change of the  ovaries bilaterally has markedly regressed. There is an iliac stent on the left that appears to be occluded. There is intermittent flow within the external iliac artery on the right. By the level of the common femoral artery, no  demonstrable flow on CT.  These findings are unchanged. No new suspicious bone lesion. Degenerative changes.  negative.     IMPRESSION:     1. Interval response to therapy. Significant interval regression of abdominal and pelvic ascites with loculated features and associated peritoneal thickening and abnormal enhancement. Interval decrease in retroperitoneal adenopathy. No new evidence of  solid organ metastasis.  2. Dilated porcelain gallbladder with a rind of persistent soft tissue thickening similar to the prior study. No new biliary ductal dilatation.  3. The ovaries appear less prominent and demonstrate interval decrease in abnormal enhancement and cystic change.  4. Advanced atherosclerotic change. Intermittent and/or absence of flow within the bilateral iliac systems. Evidence of prior vascular surgery.           Past Medical History:   Diagnosis Date   • Anemia    • Coronary artery disease    • Diabetes mellitus    • Hyperlipidemia    • Hypertension    • Peripheral vascular disease, secondary    • Stroke      Past Surgical History:   Procedure Laterality Date   • ABOVE KNEE AMPUTATION Left    • BELOW KNEE AMPUTATION Right    • TOE SURGERY     • VENOUS ACCESS DEVICE (PORT) INSERTION Right 2022    Procedure: INSERTION VENOUS ACCESS DEVICE;  Surgeon: Adama Barlow MD;  Location: [facility];  Service: General;  Laterality: Right;     Family History   Problem Relation Age of Onset   • Malig Hyperthermia Neg Hx      Social History     Socioeconomic History   • Marital status:    Tobacco Use   • Smoking status: Former     Types: Cigarettes     Quit date:      Years since quittin.2   • Smokeless tobacco: Never   Vaping Use   • Vaping Use: Never used   Substance and Sexual Activity   • Alcohol use: No   • Drug use: Never   • Sexual activity: Defer         Medications:  Current Outpatient Medications   Medication Sig Dispense Refill   • acetaminophen (TYLENOL) 325 MG tablet Take 2  tablets by mouth Every 6 (Six) Hours As Needed for Mild Pain.     • aspirin 81 MG chewable tablet Chew 1 tablet Daily.     • atorvastatin (LIPITOR) 20 MG tablet Take 1 tablet by mouth Daily.     • carvedilol (COREG) 3.125 MG tablet Take 1 tablet by mouth 2 (Two) Times a Day With Meals.     • docusate sodium (COLACE) 100 MG capsule Take 1 capsule by mouth 2 (Two) Times a Day.     • gabapentin (NEURONTIN) 300 MG capsule Take 1 capsule by mouth 3 (Three) Times a Day. (Patient taking differently: Take 1 capsule by mouth As Needed.) 90 capsule 3   • ondansetron (ZOFRAN) 4 MG tablet Take 1 tablet by mouth Every 8 (Eight) Hours As Needed for Nausea or Vomiting.     • ondansetron (ZOFRAN) 8 MG tablet Take 1 tablet by mouth 3 (Three) Times a Day As Needed for Nausea or Vomiting. 30 tablet 5   • senna (SENOKOT) 8.6 MG tablet tablet Take  by mouth Every Night.     • Continuous Blood Gluc Sensor (FreeStyle Dori 2 Sensor) misc  (Patient not taking: Reported on 3/17/2023)     • insulin aspart (novoLOG) 100 UNIT/ML injection Inject 4 Units under the skin into the appropriate area as directed 3 (Three) Times a Day Before Meals. (Patient not taking: Reported on 4/5/2023)     • Insulin Glargine (LANTUS SC) Inject 20 Units under the skin 2 (Two) Times a Day. (Patient not taking: Reported on 4/5/2023)     • Insulin Lispro, 1 Unit Dial, (HUMALOG) 100 UNIT/ML solution pen-injector  (Patient not taking: Reported on 4/5/2023)     • Multiple Vitamins-Minerals (MULTIVITAMIN WITH MINERALS) tablet tablet Take 1 tablet by mouth Daily. (Patient not taking: Reported on 3/17/2023)     • O2 (OXYGEN) Inhale 2 L/min 1 (One) Time. (Patient not taking: Reported on 3/17/2023)     • OLANZapine (ZyPREXA) 5 MG tablet Take 1 tablet by mouth Every Night. Take on days 2, 3 and 4 after chemotherapy. (Patient not taking: Reported on 3/17/2023) 3 tablet 5   • pantoprazole (PROTONIX) 20 MG EC tablet  (Patient not taking: Reported on 3/17/2023)     • pantoprazole  "(PROTONIX) 40 MG EC tablet Take 40 mg by mouth Daily. (Patient not taking: Reported on 3/17/2023)     • saccharomyces boulardii (FLORASTOR) 250 MG capsule Take 250 mg by mouth 2 (Two) Times a Day. (Patient not taking: Reported on 3/17/2023)       No current facility-administered medications for this visit.     Facility-Administered Medications Ordered in Other Visits   Medication Dose Route Frequency Provider Last Rate Last Admin   • heparin injection 500 Units  500 Units Intravenous PRN Jelly Montalvo L, DO   500 Units at 02/03/23 0908   • sodium chloride 0.9 % flush 10 mL  10 mL Intravenous PRN Katia Jelly L, DO   10 mL at 02/03/23 0908       ROS: The following systems were specifically reviewed with patient:constitutional, ophtho, ENT, CV, pulm, GI, , musculoskeletal, derm, neuro, psych, heme-onc, allergy, and endocrine (other than HPI). Positive findings were: metastatic cancer, not sure of next steps      Physical Exam:  Vital Signs:  /80   Pulse 71   Temp 96.8 °F (36 °C)   Wt 60.8 kg (134 lb)   SpO2 97%   BMI 32.31 kg/m²   Estimated body mass index is 32.36 kg/m² as calculated from the following:    Height as of 11/9/22: 137.2 cm (54\").    Weight as of 3/17/23: 60.9 kg (134 lb 3.2 oz).     General appearance - well dev WF, alert, well appearing, and in no distress  Mental status - affect appropriate to mood  Neck - No masses, no thyromegaly or nodules  Lymphatics - no palpable cervical lymphadenopathy  Chest - CTA, normal effort  Heart - normal rate and regular rhythm, no murmurs noted,  Neurological - no tremors,wheelchair bound  S/p L AKA and R BKA, manipulates wheelchair easily  Skin - no acanthosis, no skin tags       Result Review :       Assessment and Plan   1. Uncontrolled Type 2 DM, with hyperglycemia, with long-term use of insulin  Comment: poor control of DM due to nonadherence  With meds. Multiple complications including CAD, BKAs  Now with advanced metastatic malignancy. " Exacerbation of DM with steroids around chemo- pt not sure if continuing on chemo/steroids at present  Plan:  Restart Dori CGM  Increase Lantus to 60 units QD  Take Humalog 15 units with meals plus correction - which means she needs to scan Dori or test before each meal  Call if glc <70 or >250-300  Goal A1C <8-8.5 given advance malignancy and multiple complications  We may need to adjust correction dosing for steroid/chemo days, but I'd like to see her glucoses on insulin consistently first, as I really have no idea where she is since she tests infrequently and hasn't been taking insulin  Check A1C, microal, lipids  A1C before next visit.   Change insulin and CGM over to [facility] pharmacy- they will manage and arrange delivery to her home. Inability to get insulin has been a big part of her nonadherence with meds.     Plan of care reviewed with patient who verbalizes understanding and agrees.  Latisha Mendes MD [facility]         Total time spent in caring for patient today (excluding separately billed services) re: data review, chart prep, in-person visit, orders, coordination of care including pharmacy and arranging for med transfer/home delivery, and documentation: 84 minutes.      Follow Up  Return in about 3 months (around 7/5/2023).  Patient was given instructions and counseling regarding her condition or for health maintenance advice. Please see specific information pulled into the AVS if appropriate.

## 2023-04-05 NOTE — PATIENT INSTRUCTIONS
[facility] [place] [facility] WILL TAKE OVER YOUR INSULIN AND SENSORS AND HAVE THESE DELIVERED TO YOU      Get back on Dori to monitor glucoses. If unable to get then you will need to test before every meal and keep log and bring to all visits.     Scan dori before eating so you can use the meal insulin correction scale    Lantus 60 units daily    Novolog or Humalog (whichever is covered) 15 units with meals, take as you sit down to eat  ADD EXTRA depending on premeal glucose     If 150-200, add extra 2 units     201-250, add extra 4 units     251-300, add extra 6 units     301-350, add extra 8 units     351-400, add extra 10 units      >400, take extra 12 units      If you get chemo with steroids (dexamethasone) your sugars will go up and you need to monitor more often and be sure to use the correction scale.     Please schedule your follow up appointment for 3 months  Bring Dori [place] to visits  Call if glucoses running >250-300 or <70?    TEST RESULTS:  Endocrinology is a specialty that relies heavily on interpretation of labs and other testing while also considering your personal medical history. This is best done in conjunction with an office visit, ideally with labs drawn prior to the visit so that they may be discussed in person. Any tests completed more than 7 days after your visit will require a further follow up visit for review and interpretation. This visit may be arranged as a telemedicine visit, in person, or and e-visit, depending on clinician availability when results become available.?Test Performed at this medical center I will inform you of your test results-normal or otherwise. Please wait for a letter or a phone call. If for some reason you do not receive a letter of phone call about your results within 3 weeks after your test date, it is very important for you to contact our office.?Test Performed Outside of this facility: If you decide to have your tests performed outside this facility, it is your  "responsibility to contact us to confirm that the results have been received and reviewed by me.?    When you review your labs, there may be things that are noted slightly out of range that are not remarked upon by your physician. Please do not be alarmed! Many of these things are seen in some portion of a normal healthy population. Other \"abnormalities\" are within lab error range. Still others are \"calculations\" rather than actual lab values (example: BUN/Cr ratio) and the individual components in the calculation are just fine.?Labs done outside this Medical Center will not be routinely resulted in [facility] or visible for viewing there. Occasionally a few selected outside labs will be hand-entered into our computer system (for tracking purposes), in which case you will receive notification of a result. However, the entire battery of tests will still not be visible as outside labs are scanned into the EPIC system and do not appear in the lab results section. The same is true for any other tests or imaging studies. If you would like to view your results on [facility], please be sure to have your tests done at this facility?Because your labs are now being automatically \"released\" by a computer system, it is possible you may receive notification at unusual times of day. Do not be concerned- you are not being contacted at odd hours because there is an emergency! Your test results will continue to be reviewed by your physician and results requiring follow up action will lead to a phone call from our office notifying you of such a concern. Conversely, please do not call the office after hours to address your test results. Please be aware that if you are notified of test results or messages via [facility], it is your responsibility to log in to look at them. If no special intervention is needed after testing is done, you will not receive any additional contact from the office (i.e. Calls, letters, or messages). If I your results " "are normal, I am able to see that you have reviewed them and no further action is needed, you will not be called or contacted by the office.     APPOINTMENTS:  Once you schedule an appointment, the responsibility to keep it becomes yours. Please be sure to give 24 hours notice when calling the office to cancel an appointment, as less notice will also be treated as a \"no show.\" It is your responsibility to call to reschedule any missed or canceled appointments.? Excessive no-show visits will result in dismissal from the practice.    PRESCRIPTIONS:  Bring all of your medications to each visit and request the necessary refills at that time. Please allow 48 business hours for any refills requested outside of an office visit. Prescriptions will not be refilled after business hours or on weekends, so plan accordingly. Mail-order prescriptions can be electronically prescribed for you; if your mail-order pharmacy does not have this capability, you will need to mail in your written prescription.?    DIABETICS:  If you take medications to lower your glucoses, remember to always test your glucose prior to driving or operating any machinery.?Do not get pregnant without first normalizing your glucoses and discussing your plans with your physician.?Bring your glucose log book to all appointments at our office.    SCOPE OF MEDICAL CARE:  -Dr. Mendes is caring for you as your ENDOCRINOLOGIST. She does not function as a primary care provider for her patients. All patients are expected to have an internal medicine or family medicine physician to provide non-endocrine care, which also includes urgent care, annual physicals, cancer screenings, and preoperative clearance.    **Information for MyChart Usage**?    Prescription refills:  For prescription refills, please do not send a message/request as a note to your doctor- it will actually delay the process! The best way to get a prescription refill is to ask your pharmacy to contact " our office. They will then do so electronically. Please leave 48 business hours for all refills.?    Messages to the office/your physician:  We will attempt to answer messages within 2 business days. Please do not message your physician with new symptoms or concerns about possible medication side effects. It is always possible that your symptoms are more serious than you even perceive and we would like to be certain they are addressed promptly! What other type of questions may not best for [facility]? If you have a complicated question that requires more than a 2 sentence response, or multiple questions, you may be asked to make a follow up appointment. Review of test results done more than 7 days after your office visit will require an additional follow up visit to address and respond to questions. MyChart isn't a substitute for your personalized office visits.?

## 2023-04-06 ENCOUNTER — TELEPHONE (OUTPATIENT)
Dept: OTHER | Facility: HOSPITAL | Age: 63
End: 2023-04-06
Payer: MEDICARE

## 2023-04-06 LAB
CHOLEST SERPL-MCNC: 248 MG/DL (ref 0–200)
HBA1C MFR BLD: 8.9 % (ref 4.8–5.6)
HDLC SERPL-MCNC: 36 MG/DL (ref 40–60)
IMP & REVIEW OF LAB RESULTS: NORMAL
LDLC SERPL CALC-MCNC: 162 MG/DL (ref 0–100)
TRIGL SERPL-MCNC: 268 MG/DL (ref 0–150)
UNABLE TO VOID: NORMAL
VLDLC SERPL CALC-MCNC: 50 MG/DL (ref 5–40)

## 2023-04-06 NOTE — PROGRESS NOTES
Specialty Pharmacy Patient Management Program  Endocrinology Initial Assessment     Joanne Contreras is a female seen by an Endocrinology provider for Type 2 Diabetes and enrolled in the Endocrinology Patient Management program offered by [facility].  An initial outreach was conducted, including assessment of therapy appropriateness and specialty medication education for [place] 2 Sensors and reader.The patient was introduced to services offered by [facility], including: regular assessments, refill coordination, curbside pick-up or mail order delivery options, prior authorization maintenance, and financial assistance programs as applicable. The patient was also provided with contact information for the pharmacy team.     Insurance Coverage & Financial Support  [facility]    Relevant Past Medical History and Comorbidities  Relevant medical history and concomitant health conditions were discussed with the patient. The patient's chart has been reviewed for relevant past medical history and comorbid conditions and updated as necessary.  Past Medical History:   Diagnosis Date   • Anemia    • Coronary artery disease    • Diabetes mellitus    • Hyperlipidemia    • Hypertension    • Peripheral vascular disease, secondary    • Stroke      Social History     Socioeconomic History   • Marital status:    Tobacco Use   • Smoking status: Former     Types: Cigarettes     Quit date:      Years since quittin.2   • Smokeless tobacco: Never   Vaping Use   • Vaping Use: Never used   Substance and Sexual Activity   • Alcohol use: No   • Drug use: Never   • Sexual activity: Defer            Allergies  Known allergies and reactions were discussed with the patient. The patient's chart has been reviewed for  allergy information and updated as necessary.   Allergies   Allergen Reactions   • Beta Adrenergic Blockers Other (See Comments)     Make PAD pain /  claudication worse       Allergies reviewed by Melissa Huerta RPH on 4/5/2023 at  3:21 PM  Allergies reviewed by Melissa Huerta RPH on 4/6/2023 at  8:40 AM    Current Medication List  This medication list has been reviewed with the patient and evaluated for any interactions or necessary modifications/recommendations, and updated to include all prescription medications, OTC medications, and supplements the patient is currently taking.  This list reflects what is contained in the patient's profile, which has also been marked as reviewed to communicate to other providers it is the most up to date version of the patient's current medication therapy.     Current Outpatient Medications:   •  acetaminophen (TYLENOL) 325 MG tablet, Take 2 tablets by mouth Every 6 (Six) Hours As Needed for Mild Pain., Disp: , Rfl:   •  aspirin 81 MG chewable tablet, Chew 1 tablet Daily., Disp: , Rfl:   •  atorvastatin (LIPITOR) 20 MG tablet, Take 1 tablet by mouth Daily., Disp: , Rfl:   •  carvedilol (COREG) 3.125 MG tablet, Take 1 tablet by mouth 2 (Two) Times a Day With Meals., Disp: , Rfl:   •  Continuous Blood Gluc  (FreeStyle Dori 2 [place]) device, 1 Device Daily., Disp: 1 each, Rfl: PRN  •  Continuous Blood Gluc Sensor (FreeStyle Dori 2 Sensor) misc, Apply 1 sensor to the appropriate area as directed Every 14 (Fourteen) Days., Disp: 6 each, Rfl: PRN  •  docusate sodium (COLACE) 100 MG capsule, Take 1 capsule by mouth 2 (Two) Times a Day., Disp: , Rfl:   •  gabapentin (NEURONTIN) 300 MG capsule, Take 1 capsule by mouth 3 (Three) Times a Day. (Patient taking differently: Take 1 capsule by mouth As Needed.), Disp: 90 capsule, Rfl: 3  •  Insulin Glargine (Lantus SoloStar) 100 UNIT/ML injection pen, 60 units daily in AM, Disp: 30 mL, Rfl: 2  •  Insulin Lispro, 1 Unit Dial, (HumaLOG KwikPen) 100 UNIT/ML solution pen-injector, Inject 15 Units under the skin into the appropriate area as directed Daily With Breakfast, Lunch &  Dinner. add additional for correction based on glucose and provided scale, Disp: 90 mL, Rfl: 3  •  Insulin Pen Needle (BD Pen Needle Avril 2nd Gen) 32G X 4 MM misc, 1 each 4 (Four) Times a Day., Disp: 400 each, Rfl: PRN  •  Multiple Vitamins-Minerals (MULTIVITAMIN WITH MINERALS) tablet tablet, Take 1 tablet by mouth Daily., Disp: , Rfl:   •  O2 (OXYGEN), Inhale 2 L/min 1 (One) Time., Disp: , Rfl:   •  ondansetron (ZOFRAN) 8 MG tablet, Take 1 tablet by mouth 3 (Three) Times a Day As Needed for Nausea or Vomiting., Disp: 30 tablet, Rfl: 5  •  saccharomyces boulardii (FLORASTOR) 250 MG capsule, Take 1 capsule by mouth 2 (Two) Times a Day., Disp: , Rfl:   •  senna (SENOKOT) 8.6 MG tablet tablet, Take  by mouth Every Night., Disp: , Rfl:   •  OLANZapine (ZyPREXA) 5 MG tablet, Take 1 tablet by mouth Every Night. Take on days 2, 3 and 4 after chemotherapy. (Patient not taking: Reported on 3/17/2023), Disp: 3 tablet, Rfl: 5  •  ondansetron (ZOFRAN) 4 MG tablet, Take 1 tablet by mouth Every 8 (Eight) Hours As Needed for Nausea or Vomiting. (Patient not taking: Reported on 4/5/2023), Disp: , Rfl:   •  pantoprazole (PROTONIX) 20 MG EC tablet, , Disp: , Rfl:   •  pantoprazole (PROTONIX) 40 MG EC tablet, Take 40 mg by mouth Daily. (Patient not taking: Reported on 3/17/2023), Disp: , Rfl:   No current facility-administered medications for this visit.    Facility-Administered Medications Ordered in Other Visits:   •  heparin injection 500 Units, 500 Units, Intravenous, PRN, Jelly Montalvo L, DO, 500 Units at 02/03/23 0908  •  sodium chloride 0.9 % flush 10 mL, 10 mL, Intravenous, PRN, Jelly Montalvo L, DO, 10 mL at 02/03/23 0908    Medicines reviewed by Melissa Huerta [facility] on 4/5/2023 at  3:22 PM    Drug Interactions  No clinically significant drug interactions    Recommended Medications Assessment  • Aspirin: Currently Taking   • Statin: Currently Taking   • ACEi/ARB: Not Taking Currently    Relevant Laboratory Values  A1C Last  3 Results    HGBA1C Last 3 Results 7/30/22 9/19/22 4/5/23   Hemoglobin A1C 8.2 (A) 8.40 (A) 8.90 (A)   (A) Abnormal value       Comments are available for some flowsheets but are not being displayed.           Lab Results   Component Value Date    HGBA1C 8.90 (H) 04/05/2023     Lab Results   Component Value Date    GLUCOSE 237 (H) 03/17/2023    CALCIUM 9.7 03/17/2023     03/17/2023    K 3.8 03/17/2023    CO2 25.3 03/17/2023     03/17/2023    BUN 12 03/17/2023    CREATININE 0.60 03/17/2023    EGFRIFAFRI 82 06/12/2020    EGFRIFNONA 71 06/12/2020    BCR 20.0 03/17/2023    ANIONGAP 10.7 03/17/2023     Lab Results   Component Value Date    CHLPL 248 (H) 04/05/2023    TRIG 268 (H) 04/05/2023    HDL 36 (L) 04/05/2023     (H) 04/05/2023         Initial Education Provided for Specialty Medication  The patient has been provided with the following education and any applicable administration techniques (i.e. self-injection) have been demonstrated for the therapies indicated. All questions and concerns have been addressed prior to the patient receiving the medication, and the patient has verbalized comprehension of the education and any materials provided. Additional patient education shall be provided and documented upon request by the patient, provider, or payer.    Adherence and Self-Administration  • Barriers to Patient Adherence and/or Self-Administration: Patient states her granddaughter used to help her place her Freestyle Dori 2 sensors but has moved away.   • Methods for Supporting Patient Adherence and/or Self-Administration: Patient is aware that we are able to assist and make sure she feels comfortable placing sensors if she cannot get someone at home to help.     Goals of Therapy   Goals     • Specialty Pharmacy General Goal      Reduce HbA1c <8%    • 04/05/23 = 8.9%          Reassessment Plan & Follow-Up  1. Medication Therapy Changes: None  2. Additional Plans, Therapy Recommendations, or  Therapy Problems to Be Addressed: None  3. Pharmacist to perform regular assessments no more than (6) months from the previous assessment.  4. Care Coordinator to set up future refill outreaches, coordinate prescription delivery, and escalate clinical questions to pharmacist.  5. Welcome information and patient satisfaction survey to be sent by specialty pharmacy team with patient's initial fill.    Attestation  I attest that the initiated specialty medications are appropriate for the patient based on my assessment. If the prescribed therapy is at any point deemed not appropriate based on the current or future assessments, a consultation will be initiated with the patient's specialty care provider to determine the best course of action. The revised plan of therapy will be documented along with any required assessments and/or additional patient education provided.     Elvira Huerta, PharmQAMAR, MM  Clinical Specialty Pharmacist, Endocrinology  4/6/2023  08:44 EDT

## 2023-04-06 NOTE — TELEPHONE ENCOUNTER
Oncology Social Work     OSW reached out to patient related to OSW referral placed by Dr. Montalvo. OSW left a detailed voicemail for the patient and will follow-up at a later time.     Piper ARANDA

## 2023-04-10 ENCOUNTER — HOSPITAL ENCOUNTER (OUTPATIENT)
Dept: CT IMAGING | Facility: HOSPITAL | Age: 63
Discharge: HOME | End: 2023-04-10
Payer: MEDICARE

## 2023-04-10 DIAGNOSIS — C57.00 FALLOPIAN TUBE CARCINOMA, UNSPECIFIED LATERALITY: ICD-10-CM

## 2023-04-10 PROCEDURE — 71260 CT THORAX DX C+: CPT

## 2023-04-10 PROCEDURE — 74177 CT ABD & PELVIS W/CONTRAST: CPT

## 2023-04-10 PROCEDURE — 25510000001 IOPAMIDOL 61 % SOLUTION: Performed by: OBSTETRICS & GYNECOLOGY

## 2023-04-10 RX ADMIN — IOPAMIDOL 100 ML: 612 INJECTION, SOLUTION INTRAVENOUS at 13:23

## 2023-04-12 ENCOUNTER — HOME HEALTH ADMISSION (OUTPATIENT)
Dept: HOME HEALTH SERVICES | Facility: HOME HEALTHCARE | Age: 63
End: 2023-04-12
Payer: MEDICARE

## 2023-04-12 ENCOUNTER — OFFICE VISIT (OUTPATIENT)
Dept: GYNECOLOGIC ONCOLOGY | Facility: CLINIC | Age: 63
End: 2023-04-12
Payer: MEDICARE

## 2023-04-12 VITALS
RESPIRATION RATE: 12 BRPM | HEART RATE: 73 BPM | TEMPERATURE: 97.4 F | OXYGEN SATURATION: 98 % | SYSTOLIC BLOOD PRESSURE: 162 MMHG | DIASTOLIC BLOOD PRESSURE: 73 MMHG

## 2023-04-12 DIAGNOSIS — C56.9 MALIGNANT NEOPLASM OF OVARY, UNSPECIFIED LATERALITY: Primary | ICD-10-CM

## 2023-04-12 DIAGNOSIS — Z79.4 TYPE 2 DIABETES MELLITUS WITH HYPERGLYCEMIA, WITH LONG-TERM CURRENT USE OF INSULIN: Primary | ICD-10-CM

## 2023-04-12 DIAGNOSIS — E11.65 TYPE 2 DIABETES MELLITUS WITH HYPERGLYCEMIA, WITH LONG-TERM CURRENT USE OF INSULIN: Primary | ICD-10-CM

## 2023-04-12 PROCEDURE — 3078F DIAST BP <80 MM HG: CPT | Performed by: OBSTETRICS & GYNECOLOGY

## 2023-04-12 PROCEDURE — 99213 OFFICE O/P EST LOW 20 MIN: CPT | Performed by: OBSTETRICS & GYNECOLOGY

## 2023-04-12 PROCEDURE — 3077F SYST BP >= 140 MM HG: CPT | Performed by: OBSTETRICS & GYNECOLOGY

## 2023-04-12 PROCEDURE — 1125F AMNT PAIN NOTED PAIN PRSNT: CPT | Performed by: OBSTETRICS & GYNECOLOGY

## 2023-04-12 NOTE — PROGRESS NOTES
Age: 63 y.o.  Sex: female  :  1960  MRN: [id number]       REFERRING PHYSICIAN: No ref. 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 She has tolerated therapy well and CT has shows significant response, from initiation in September to November CT and now stable since November.   She has seen endocrine and now with insulin pump, today with difficulty as she is uncertain how to place this. Most recent A1C is 8.9. Her triglycerides were markedly elevated. She understands that our mainstay of treatment will be chemo only, as her comorbidities are prohibitive due to surgical risk.       Oncology/Hematology History Overview Note   • 22: CT A/P - Findings suspicious for peritoneal carcinomatosis with ascites and metastatic disease to both ovaries. Recommend diagnostic paracentesis for cytology. Also noted is a porcelain gallbladder and pancreatic atrophy.  • 22: Paracentesis - rare atypical cells suspicious but not diagnostic for carcinoma. Reactive mesothelium & lymphocytes.   • 22: Pelvic US - The bilateral ovaries appear slightly enlarged and heterogeneous but no dominant adnexal masses are seen.  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   • 22: Paracentesis - removed 3400 mL of dark katiana fluid   • 22-3/17/23: 6 cycles Paclitaxel 175 mg/m2  Carboplatin AUC = 6   • 22: CT C/A/P - Interval response to therapy. Significant interval regression of abdominal and pelvic ascites with loculated features and associated peritoneal thickening and abnormal enhancement. Interval decrease in retroperitoneal adenopathy. No new evidence of solid organ metastasis. Dilated porcelain gallbladder with a rind of persistent soft tissue thickening similar to the prior study. No new biliary  ductal dilatation. The ovaries appear less prominent and demonstrate interval decrease in abnormal enhancement and cystic change. Advanced atherosclerotic change. Intermittent and/or absence of flow within the bilateral iliac systems. Evidence of prior vascular surgery  • 4/10/23: CT AP -  No significant interval change from the study performed in September. No new evidence of solid organ metastasis. No recurrent ascites. Peritoneal studding appears similar compared to the November examination. Redemonstration of a dilated porcelain gallbladder and adjacent rind of soft tissue measuring up to 1.5 cm. No new adnexal mass or drainable fluid collection. Advanced atherosclerotic change of the aorta and iliac systems.      4/12/23: CT REVIEW         Fallopian tube carcinoma, unspecified laterality   9/19/2022 -  Other Event    CA 19-9: Less than 2.0      Other Event    CA-125  2/3/2023 30.5   1/13/2023 43.9   11/30/2022 70.2   10/26/2022 1182   10/19/2022 2059   9/19/2022 6024            Past Medical History:  Past Medical History:   Diagnosis Date   • Anemia    • Coronary artery disease    • Diabetes mellitus    • Hyperlipidemia    • Hypertension    • Peripheral vascular disease, secondary    • Stroke 2022       Past Surgical History:  Past Surgical History:   Procedure Laterality Date   • ABOVE KNEE AMPUTATION Left    • BELOW KNEE AMPUTATION Right    • TOE SURGERY     • VENOUS ACCESS DEVICE (PORT) INSERTION Right 9/28/2022    Procedure: INSERTION VENOUS ACCESS DEVICE;  Surgeon: Adama Barlow MD;  Location: [facility];  Service: General;  Laterality: Right;        Current Meds:    Current Outpatient Medications:   •  acetaminophen (TYLENOL) 325 MG tablet, Take 2 tablets by mouth Every 6 (Six) Hours As Needed for Mild Pain., Disp: , Rfl:   •  aspirin 81 MG chewable tablet, Chew 1 tablet Daily., Disp: , Rfl:   •  atorvastatin (LIPITOR) 20 MG tablet, Take 1 tablet by mouth Daily., Disp: , Rfl:   •  carvedilol  (COREG) 3.125 MG tablet, Take 1 tablet by mouth 2 (Two) Times a Day With Meals., Disp: , Rfl:   •  Continuous Blood Gluc  (FreeStyle Dori 2 [place]) device, 1 Device Daily., Disp: 1 each, Rfl: PRN  •  Continuous Blood Gluc Sensor (FreeStyle Dori 2 Sensor) misc, Apply 1 sensor to the appropriate area as directed Every 14 (Fourteen) Days., Disp: 6 each, Rfl: PRN  •  docusate sodium (COLACE) 100 MG capsule, Take 1 capsule by mouth 2 (Two) Times a Day., Disp: , Rfl:   •  gabapentin (NEURONTIN) 300 MG capsule, Take 1 capsule by mouth 3 (Three) Times a Day. (Patient taking differently: Take 1 capsule by mouth As Needed.), Disp: 90 capsule, Rfl: 3  •  Insulin Glargine (Lantus SoloStar) 100 UNIT/ML injection pen, 60 units daily in AM, Disp: 30 mL, Rfl: 2  •  Insulin Lispro, 1 Unit Dial, (HumaLOG KwikPen) 100 UNIT/ML solution pen-injector, Inject 15 Units under the skin into the appropriate area as directed Daily With Breakfast, Lunch & Dinner. add additional for correction based on glucose and provided scale, Disp: 90 mL, Rfl: 3  •  Insulin Pen Needle (BD Pen Needle Avril 2nd Gen) 32G X 4 MM misc, 1 each 4 (Four) Times a Day., Disp: 400 each, Rfl: PRN  •  ondansetron (ZOFRAN) 4 MG tablet, Take 1 tablet by mouth Every 8 (Eight) Hours As Needed for Nausea or Vomiting., Disp: , Rfl:   •  ondansetron (ZOFRAN) 8 MG tablet, Take 1 tablet by mouth 3 (Three) Times a Day As Needed for Nausea or Vomiting., Disp: 30 tablet, Rfl: 5  •  senna (SENOKOT) 8.6 MG tablet tablet, Take  by mouth As Needed., Disp: , Rfl:   •  Multiple Vitamins-Minerals (MULTIVITAMIN WITH MINERALS) tablet tablet, Take 1 tablet by mouth Daily. (Patient not taking: Reported on 4/12/2023), Disp: , Rfl:   •  O2 (OXYGEN), Inhale 2 L/min 1 (One) Time. (Patient not taking: Reported on 4/12/2023), Disp: , Rfl:   •  OLANZapine (ZyPREXA) 5 MG tablet, Take 1 tablet by mouth Every Night. Take on days 2, 3 and 4 after chemotherapy. (Patient not taking: Reported on  3/17/2023), Disp: 3 tablet, Rfl: 5  •  pantoprazole (PROTONIX) 20 MG EC tablet, , Disp: , Rfl:   •  pantoprazole (PROTONIX) 40 MG EC tablet, Take 40 mg by mouth Daily. (Patient not taking: Reported on 3/17/2023), Disp: , Rfl:   •  saccharomyces boulardii (FLORASTOR) 250 MG capsule, Take 1 capsule by mouth 2 (Two) Times a Day. (Patient not taking: Reported on 4/12/2023), Disp: , Rfl:   No current facility-administered medications for this visit.    Facility-Administered Medications Ordered in Other Visits:   •  heparin injection 500 Units, 500 Units, Intravenous, PRN, Katia Jelly L, DO, 500 Units at 02/03/23 0908  •  sodium chloride 0.9 % flush 10 mL, 10 mL, Intravenous, PRN, Katia, Jelly L, DO, 10 mL at 02/03/23 0908      ALLERGIES:  Allergies   Allergen Reactions   • Beta Adrenergic Blockers Other (See Comments)     Make PAD pain / claudication worse         ROS:  CONSTITUTIONAL:  Denies fever or chills.   NEUROLOGIC:  Denies headache, focal weakness or sensory changes.   EYES:  Denies change in visual acuity.  HEENT:  Denies nasal congestion or sore throat.   RESPIRATORY:  Denies cough or shortness of breath.   CARDIOVASCULAR:  Denies chest pain or edema.   GI:  Denies abdominal pain, nausea, vomiting, bloody stools or diarrhea.   :  Denies dysuria, leaking or incontinence.  MUSCULOSKELETAL:  Denies back pain or joint pain.   INTEGUMENT:  Denies rash.   ENDOCRINE:  Denies polyuria or polydipsia.   LYMPHATIC:  Denies swollen glands or lymphedema.   PSYCHIATRIC:  Denies depression or anxiety.      PHYSICAL EXAM:  Vitals:    04/12/23 0849   BP: 162/73  Comment: took twice   Pulse: 73   Resp: 12   Temp: 97.4 °F (36.3 °C)   TempSrc: Temporal   SpO2: 98%   Weight: Comment: unable to obtain   Height: Comment: unable to obtain   PainSc:   7   PainLoc: Finger  Comment: both hands and fingers go numb and bother pt     There is no height or weight on file to calculate BMI.      4/12/2023     8:44 AM   Current Status    ECOG score 2       GEN: alert and oriented x 3, normal affect, well nourished and hydrated.  CARDIO: regular rate and rhythm.  PULM: Lungs CTA b.l, no RRW   ABD:Soft, nontender, nondistended.   GYN: defer  EXT: No petechiae, bruising, rash, candida. No CCE.           Result Review :  The pertinent labs, images, and/or pathology as noted in the oncology history were reviewed independently and discussed with the patient.   Jelly Montalvo, DO   04/12/2023    [facility]:   Elevated trigs    [facility]:  CT noted 4/10/23   IMPRESSION:  1. No significant interval change from the study performed in September. No new evidence of solid organ metastasis. No recurrent ascites. Peritoneal studding appears similar compared to the November examination.  2. Redemonstration of a dilated porcelain gallbladder and adjacent rind of soft tissue measuring up to 1.5 cm.  3. No new adnexal mass or drainable fluid collection.  4. Advanced atherosclerotic change of the aorta and iliac systems.      ASSESSMENT :  • HG mullerian carcinoma - dx via omental bx 9/23/22  • Paracentesis 3400mL  9/26/22  •   = 6024 at diangosis, most recent 70.2 on 11/30/22  • Carboplatin AUC 6 (550mg capped) /  Taxol 175mg/m2 x 3C   • CT with improvement in lesion size, resolution of ascites.   • Deemed unacceptable surgical risk due to diffuse triple vessel disease and no opitons for PCI or surgical revascularization. Dr Arturo Vasquez is her cardiologist   • Carboplatin AUC 6 (480mg capped) / Taxol 175mg/m2 x 3C   • CT 4/10/23 Stability of peritoneal studding, no new lesions   • Uncontrolled DM - A1C 11 -  17U humalog AC, Lantus 68U BID   - Non compliance with diabetes management  • Porcelain gallbladder   • CAD - on ASA and statin  • HTN  • Peripheral vascular disease- post R BKA /  L AKA  • Noncomplaince affecting her care  • Difficulty coping        PLAN :  • Continue to closely follow with endocrinology - will call and ask for assistance w insulin  pump.  • Will transition to PO maintenance at this time Zejula 100mg po daily  • Refer for survivorship while on maintenance therapy  • VICTORINA report is pending  • Plan to continue with chemotherapy for now as long as she shows continued response    •    - Will Wilian (relative) [phone]  - Sherrie Rivero (relative) [phone]  - Roe Burgess (son) [phone]        CC: Jesi Spence [phone]          Jelly Montalvo D.O  4/12/2023    Gynecologic Oncology   [address] [place] [address]  [phone] office

## 2023-04-13 ENCOUNTER — SPECIALTY PHARMACY (OUTPATIENT)
Dept: PHARMACY | Facility: HOSPITAL | Age: 63
End: 2023-04-13
Payer: MEDICARE

## 2023-04-13 ENCOUNTER — TELEPHONE (OUTPATIENT)
Dept: OTHER | Facility: HOSPITAL | Age: 63
End: 2023-04-13
Payer: MEDICARE

## 2023-04-13 NOTE — TELEPHONE ENCOUNTER
Oncology Social Work     OSW reached out to patient related to OSW referral placed by Dr. Montalvo. Patient had expressed some transportation concerns. OSW left a detailed voicemail and will follow-up with the patient at a later time.     Piper ARANDA

## 2023-04-13 NOTE — PROGRESS NOTES
PA sent to both insurances companies.  Waiting on response from Medicaid.  Tai approved the fill.

## 2023-04-14 ENCOUNTER — SPECIALTY PHARMACY (OUTPATIENT)
Dept: PHARMACY | Facility: HOSPITAL | Age: 63
End: 2023-04-14
Payer: MEDICARE

## 2023-04-14 NOTE — PROGRESS NOTES
RE Madrigal has been approved thru Medicare D at no charge to pt.  Pt can fill at [facility].    Adrienne Beatty  Specialty Pharmacy Technician

## 2023-04-19 LAB
LAB AP CASE REPORT: NORMAL
LAB AP CLINICAL INFORMATION: NORMAL
LAB AP DIAGNOSIS COMMENT: NORMAL
LAB AP SPECIAL STAINS: NORMAL
PATH REPORT.ADDENDUM SPEC: NORMAL
PATH REPORT.FINAL DX SPEC: NORMAL
PATH REPORT.GROSS SPEC: NORMAL

## 2023-04-21 ENCOUNTER — TELEPHONE (OUTPATIENT)
Dept: GYNECOLOGIC ONCOLOGY | Age: 63
End: 2023-04-21

## 2023-04-21 NOTE — TELEPHONE ENCOUNTER
Caller: Joanne Contreras    Relationship: Self    Best call back number: [phone]    What is the best time to reach you: ANY    Who are you requesting to speak with (clinical staff, provider,  specific staff member): CLINICAL      What was the call regarding: JOANNE IS CALLING STATES THAT DR BROWN WAS SUPPOSE TO CALL HER IN A CANCER MEDICATION TO HER PHARMACY.   JOANNE CALLED HER PHARMACY AND THEY TOLD HER NOTHING HAS BEEN CALLED IN      PHARMACY [phone]  SHE STATES THAT THEY MAIL HER RX'S        Do you require a callback: YES

## 2023-04-24 ENCOUNTER — TELEPHONE (OUTPATIENT)
Dept: OTHER | Facility: HOSPITAL | Age: 63
End: 2023-04-24
Payer: MEDICARE

## 2023-04-24 NOTE — TELEPHONE ENCOUNTER
Oncology Social Work    OSW connected with patient via telephone related to community resources. [facility]. She needs assistance with transportation to the grocery store. She recently switched her medications to be home delivered. She does not drive and does not have reliable family or friends to provide transportation to the store. Patient lives in [place], [place] and that area does not have a local bus or any local transportation service. OSW suggested ordering her groceries online and having them delivered. OSW sent patient [employer] resources. Patient voiced understanding.    OSW reached out to [facility], [phone] for possible resources. OSW will mail patient utility assistance through the [facility]. OSW is available to patient if any other needs arise.       Piper Chapa CSW

## 2023-04-25 ENCOUNTER — TELEPHONE (OUTPATIENT)
Dept: GYNECOLOGIC ONCOLOGY | Facility: CLINIC | Age: 63
End: 2023-04-25
Payer: MEDICARE

## 2023-04-26 ENCOUNTER — SPECIALTY PHARMACY (OUTPATIENT)
Dept: ONCOLOGY | Facility: HOSPITAL | Age: 63
End: 2023-04-26
Payer: MEDICARE

## 2023-04-26 ENCOUNTER — DOCUMENTATION (OUTPATIENT)
Dept: ENDOCRINOLOGY | Age: 63
End: 2023-04-26
Payer: MEDICARE

## 2023-04-26 DIAGNOSIS — C56.9 MALIGNANT NEOPLASM OF OVARY, UNSPECIFIED LATERALITY: Primary | ICD-10-CM

## 2023-04-26 RX ORDER — ONDANSETRON HYDROCHLORIDE 8 MG/1
8 TABLET, FILM COATED ORAL EVERY 8 HOURS
Qty: 60 TABLET | Refills: 5 | Status: SHIPPED | OUTPATIENT
Start: 2023-04-26

## 2023-04-26 NOTE — PROGRESS NOTES
Specialty Pharmacy Patient Management Program  Oncology Initial Assessment       Joanne Cnotreras is a 63 y.o. female with ovarian cancer seen by an Oncology provider and enrolled in the Oncology Patient Management program offered by [facility].  An initial outreach was conducted, including assessment of therapy appropriateness and specialty medication education for Zejula (niraparib). The patient was introduced to services offered by [facility], including: regular assessments, refill coordination, mail order delivery options, prior authorization maintenance, and financial assistance programs as applicable. The patient was also provided with contact information for the pharmacy team.     Diagnosis: Ovarian     Goal of chemotherapy: disease control    Treatment Medication(s) / Frequency and Dosing    1. Zejula 100 mg daily    Number of cycles: as determined by the provider    Start date of oral specialty medication: As soon as oral specialty medication is available.    Follow-up Testing to be determined after TBD cycles by MD.     Items for home use: Senokot-S (for constipation) and Thermometer     Rx written for: [x] Nausea    [] Pre-Chemo   ondansetron 8 mg by mouth every 8 hours as needed for nausea      Completing Pharmacist: Kenyatta Hastings RPH             Date/time: 04/26/2023 15:54 EDT         Relevant Past Medical History, Comorbidities, and Vaccines  Relevant medical history and concomitant health conditions were discussed with the patient. The patient's chart has been reviewed for relevant past medical history and comorbid health conditions and updated as necessary.  Vaccines are coordinated by the patient's oncologist and primary care provider.  Past Medical History:   Diagnosis Date   • Anemia    • Coronary artery disease    • Diabetes mellitus    • Hyperlipidemia    • Hypertension    • Peripheral vascular disease, secondary    • Stroke 2022     Social  History     Socioeconomic History   • Marital status:    Tobacco Use   • Smoking status: Former     Types: Cigarettes     Quit date:      Years since quittin.3   • Smokeless tobacco: Never   Vaping Use   • Vaping Use: Never used   Substance and Sexual Activity   • Alcohol use: No   • Drug use: Never   • Sexual activity: Defer       Allergies  Known allergies and reactions were discussed with the patient. The patient's chart has been reviewed for allergy information and updated as necessary.   Allergies   Allergen Reactions   • Beta Adrenergic Blockers Other (See Comments)     Make PAD pain / claudication worse        Current Medication List  This medication list has been reviewed with the patient and evaluated for any interactions or necessary modifications/recommendations, and updated to include all prescription medications, OTC medications, and supplements the patient is currently taking.  This list reflects what is contained in the patient's profile, which has also been marked as reviewed to communicate to other providers it is the most up to date version of the patient's current medication therapy.   Prior to Admission medications    Medication Sig Start Date End Date Taking? Authorizing Provider   acetaminophen (TYLENOL) 325 MG tablet Take 2 tablets by mouth Every 6 (Six) Hours As Needed for Mild Pain.    Reinaldo Tolliver MD   aspirin 81 MG chewable tablet Chew 1 tablet Daily.    Reinaldo Tolliver MD   atorvastatin (LIPITOR) 20 MG tablet Take 1 tablet by mouth Daily.    Reinaldo Tolliver MD   carvedilol (COREG) 3.125 MG tablet Take 1 tablet by mouth 2 (Two) Times a Day With Meals.    Reinaldo Tolliver MD   Continuous Blood Gluc  (FreeStyle Dori 2 [place]) device 1 Device Daily. 23   Latisha Mendes MD   Continuous Blood Gluc Sensor (FreeStyle Dori 2 Sensor) misc Apply 1 sensor to the appropriate area as directed Every 14 (Fourteen) Days. 23   Cinthya  MD Latisha   docusate sodium (COLACE) 100 MG capsule Take 1 capsule by mouth 2 (Two) Times a Day.    Reinaldo Tolliver MD   gabapentin (NEURONTIN) 300 MG capsule Take 1 capsule by mouth 3 (Three) Times a Day.  Patient taking differently: Take 1 capsule by mouth As Needed. 11/16/22   Lorelei Nieto PA-C   Insulin Glargine (Lantus SoloStar) 100 UNIT/ML injection pen 60 units daily in AM 4/5/23   Latisha Mendes MD   Insulin Lispro, 1 Unit Dial, (HumaLOG KwikPen) 100 UNIT/ML solution pen-injector Inject 15 Units under the skin into the appropriate area as directed Daily With Breakfast, Lunch & Dinner. add additional for correction based on glucose and provided scale 4/5/23   Latisha Mendes MD   Insulin Pen Needle (BD Pen Needle Avril 2nd Gen) 32G X 4 MM misc 1 each 4 (Four) Times a Day. 4/5/23   Latisha Mendes MD   Multiple Vitamins-Minerals (MULTIVITAMIN WITH MINERALS) tablet tablet Take 1 tablet by mouth Daily.  Patient not taking: Reported on 4/12/2023    Reinaldo Tolliver MD   Niraparib Tosylate 100 MG capsule Take 1 capsule by mouth Daily. Take with or without food.  Swallow whole, do not crush or dissolve. 4/26/23   Jelly Montalvo, DO   O2 (OXYGEN) Inhale 2 L/min 1 (One) Time.  Patient not taking: Reported on 4/12/2023    Reinaldo Tolliver MD   OLANZapine (ZyPREXA) 5 MG tablet Take 1 tablet by mouth Every Night. Take on days 2, 3 and 4 after chemotherapy.  Patient not taking: Reported on 3/17/2023 9/28/22   Jelly Montalvo DO   ondansetron (ZOFRAN) 8 MG tablet Take 1 tablet PO 30 minutes prior to cancer treatment daily and every 8 hours as needed for breakthrough nausea 4/26/23   Jelly Montalvo DO   pantoprazole (PROTONIX) 20 MG EC tablet  10/26/22   Reinaldo Tolliver MD   pantoprazole (PROTONIX) 40 MG EC tablet Take 40 mg by mouth Daily.  Patient not taking: Reported on 3/17/2023    Provider, Historical, MD   saccharomyces boulardii (FLORASTOR) 250 MG capsule Take 1  capsule by mouth 2 (Two) Times a Day.  Patient not taking: Reported on 4/12/2023    Reinaldo Tolliver MD   senna (SENOKOT) 8.6 MG tablet tablet Take  by mouth As Needed.    Reinaldo Tolliver MD   Continuous Blood Gluc  (FreeStyle Dori 2 [place]) device 1 Device Daily. 4/5/23 4/5/23  Latisha Mendes MD   Continuous Blood Gluc Sensor (FreeStyle Dori 2 Sensor) misc  7/25/22 4/5/23  Reinaldo Tolliver MD   Continuous Blood Gluc Sensor (FreeStyle Dori 2 Sensor) misc Inject 1 each under the skin into the appropriate area as directed Every 14 (Fourteen) Days. 4/5/23 4/5/23  Latisha Mendes MD   insulin aspart (novoLOG) 100 UNIT/ML injection Inject 4 Units under the skin into the appropriate area as directed 3 (Three) Times a Day Before Meals.  Patient not taking: Reported on 4/5/2023 4/5/23  Reinaldo Tolliver MD   Insulin Glargine (LANTUS SC) Inject 20 Units under the skin 2 (Two) Times a Day.  Patient not taking: Reported on 4/5/2023 4/5/23  Reinaldo Tolliver MD   Insulin Lispro, 1 Unit Dial, (HUMALOG) 100 UNIT/ML solution pen-injector  11/19/22 4/5/23  Reinaldo Tolliver MD   ondansetron (ZOFRAN) 4 MG tablet Take 1 tablet by mouth Every 8 (Eight) Hours As Needed for Nausea or Vomiting.  4/26/23  Reinaldo Tolliver MD   ondansetron (ZOFRAN) 8 MG tablet Take 1 tablet by mouth 3 (Three) Times a Day As Needed for Nausea or Vomiting. 9/28/22 4/26/23  Jelly Montalvo, DO       Drug Interactions  • Reviewed concomitant medications, allergies, labs, comorbidities/medical history, quality of life, and immunization history.   • Drug-drug interactions noted and discussed during education: no significant drug interactions noted. . Reminded the patient to let us know before making any changes or starting any new prescription or OTC medications so we can first assess drug interactions.  • Drug-food interactions noted and discussed during education: Patient was instructed to avoid  none    Recommended Medications Assessment  • Bone Health (such as calcium/vitamin D, bisphosphonate, RANKL inhibitor) - Not Indicated   • VTE prophylaxis - Not Indicated   Prophylactic antimicrobials - Not Indicated       Relevant Laboratory Values  Lab Results   Component Value Date    GLUCOSE 237 (H) 03/17/2023    CALCIUM 9.7 03/17/2023     03/17/2023    K 3.8 03/17/2023    CO2 25.3 03/17/2023     03/17/2023    BUN 12 03/17/2023    CREATININE 0.60 03/17/2023    EGFRIFAFRI 82 06/12/2020    EGFRIFNONA 71 06/12/2020    BCR 20.0 03/17/2023    ANIONGAP 10.7 03/17/2023     Lab Results   Component Value Date    WBC 6.07 03/17/2023    RBC 3.80 03/17/2023    HGB 12.6 03/17/2023    HCT 39.3 03/17/2023    .4 (H) 03/17/2023    MCH 33.2 (H) 03/17/2023    MCHC 32.1 03/17/2023    RDW 14.5 03/17/2023    RDWSD 55.5 (H) 03/17/2023    MPV 10.1 03/17/2023     03/17/2023    NEUTRORELPCT 53.1 03/17/2023    LYMPHORELPCT 33.6 03/17/2023    MONORELPCT 10.2 03/17/2023    EOSRELPCT 2.0 03/17/2023    BASORELPCT 0.8 03/17/2023    AUTOIGPER 0.3 03/17/2023    NEUTROABS 3.22 03/17/2023    LYMPHSABS 2.04 03/17/2023    MONOSABS 0.62 03/17/2023    EOSABS 0.12 03/17/2023    BASOSABS 0.05 03/17/2023    AUTOIGNUM 0.02 03/17/2023    NRBC 0.0 03/17/2023       The above labs have been reviewed. No dose adjustments are needed for the oral specialty medication(s) based on the labs.    Initial Education Provided for Specialty Medication  The patient has been provided with the following education. All questions and concerns have been addressed prior to the patient receiving the medication, and the patient has verbalized understanding of the education and any materials provided.  Additional patient education shall be provided and documented upon request by the patient, provider or payer.      Provided patient with:   Education sheets about the medication, 24-hour clinic phone number and my contact information and instructions to  call should additional questions arise.     Medication Education Sheets Provided: (select all that apply)  • Oral Specialty Medication: Zejula (niraparib)    Other Education Sheets Provided: (select all that apply)  Constipation    TOPICS COMMENTS   Storage and Handling of Oral Specialty Medication Store in the original container, in a dry location out of direct sunlight, and out of reach of children or pets. and Store at room temperature.  Discussed safe handling and what to do with any unused medication.   Administration of Oral Specialty Medication Take with food at the same time(s) each day. and take at night to minimize nausea   Adherence to Oral Specialty Regimen and Handling Missed Doses Patient is likely to have good treatment adherence; reinforced the importance of adherence. Reviewed how to address missed doses and to let us know of any missed doses.   Anemia: role of RBC, cause, s/s, ways to manage, role of transfusion Reviewed the role of RBC and the use of transfusions if hemoglobin decreases too much.  Patient to notify us if they experience shortness of breath, dizziness, or palpitations.  Also let patient know they could feel more tired than usual and to try to stay active, but rest if they need to.    Thrombocytopenia: role of platelet, cause, s/s, ways to prevent bleeding, things to avoid, when to seek help Reviewed the role of platelets in blood clotting and when to call clinic (bloody nose that bleeds for 5 mins despite pressure, a cut that won't stop bleeding despite pressure, gums that bleed excessively with brushing or flossing). Recommended using an electric razor, soft bristle toothbrush, and blowing your nose gently.    Neutropenia: role of WBC, cause, infection precautions, s/s of infection, when to call MD Reviewed the role of WBC, good infection prevention practices, and when to call the clinic (temperature 100.4F, sore throat, burning urination, etc)  • COVID Vaccines: No doses  received  • Flu Vaccine: unknown   Constipation: causes, ways to manage, dietary changes, when to call MD Provided supplementary handout with instructions for use of docusate and other OTC therapies to manage constipation.  Instructed to call us if medications aren't working.   Nausea/Vomiting: cause, use of antiemetics, dietary changes, when to call MD • Emetic risk: Moderate  • PRN home meds: Ondansetron  • Pharmacy home meds sent to: [facility]    Instructed the patient to take a dose of the PRN medication at the first onset of nausea and if it's not working to call us for additional medications.  Also provided non-drug measures to mitigate nausea.   Nervous System Changes: causes, s/s, neuropathies, cognitive changes, ways to manage discussed the rare, but serious risk of PRES and the signs/symptoms   Organ Toxicities: cause, s/s, need for diagnostic tests, labs, when to notify MD Discussed potential effects on organ systems, monitoring, diagnostic tests, labs, and when to notify their MD. Discussed the signs/symptoms of the following: hepatotoxicity and nephrotoxicity   Miscellaneous • Financial Issues: none  • Lab Draws: determined by provider   Infertility and Sexuality:  causes, fertility preservation options, sexuality changes, ways to manage, importance of birth control The patient is not of childbearing potential.   Home Care: how to manage bodily fluids Counseled on management of soiled linens and proper flush technique.  Discussed how to manage all the side effects at home and advised when to contact the MD office   Survivorship: distress, distress assessment, secondary malignancies, early/late effects, follow-up, social issues, social support Discussed the rare, but possible risk of secondary malignancies months to years after treatment, most commonly Acute myeloid leukemia     Adherence and Self-Administration  • Barriers to Patient Adherence and/or Self-Administration: none  • Methods for Supporting  Patient Adherence and/or Self-Administration: noen  • Expected duration of therapy: as discussed with provider    Goals of Therapy  • Patient Goals of Therapy:   o Consistently take medications as prescribed  o Patient will adhere to medication regimen  o Patient will report any medication side effects to healthcare provider  • Clinical Goals:    Goals     • Specialty Pharmacy General Goal      Reduce HbA1c <8%    • 04/05/23 = 8.9%      • Specialty Pharmacy General Goal      Disease control            o Support patient understanding of medication regimen  o Ensure patient knows the pharmacy contact information  o Schedule regular follow-up to monitor the treatment serious adverse events  o Schedule regular follow-up to confirm medication adherence  o Schedule regular follow-up to monitor disease progression or stability    Quality of Life Assessment   The patient's current (pre-therapy) quality of life was discussed with the patient. The QOL segment of this outreach has been reviewed and updated.   • Quality of Life Score: 5/10    Wrap up  Discussed aforementioned material with patient via telemedicine.   Chemo consents/CCA will need to be signed at next visit to office on date to be determined.   Medication availability: patient will receive medication from [facility] pharmacy on: date to be scheduled with care coordinator  Patient expressed understanding.   Patient demonstrates ability to self-administer medication. No barriers to adherence identified.  All questions and concerns addressed.     Reassessment Plan & Follow-Up  1. Pharmacist to perform regular reassessments no more than (6) months from the previous assessment.  2. Welcome information and patient satisfaction survey to be sent by retail team with patient's initial fill.  3. Care Coordinator to set up future refill outreaches, coordinate prescription delivery, and escalate clinical questions to pharmacist.     Additional Plans, Therapy Recommendations or  Therapy Problems to Be Addressed: none     Attestation  I attest that the initiated specialty medication(s) are appropriate for the patient based on my assessment.  If the prescribed therapy is at any point deemed not appropriate based on the current or future assessments, a consultation will be initiated with the patient's specialty care provider to determine the best course of action. The revised plan of therapy will be documented along with any additional patient education provided.     Name/Credentials: Eb Hastings PharmD, BCOP    Date and Time: 4/26/2023  15:54 EDT       Completed independent double check on medication order/RX. Ashlee Traore, Dawn, BCOP

## 2023-04-26 NOTE — PROGRESS NOTES
Specialty Pharmacy Refill Coordination Note     Joanne is a 63 y.o. female contacted today regarding refills of  1 specialty medication(s).    Reviewed and verified with patient:       Specialty medication(s) and dose(s) confirmed: yes    *Scheduled shipment for Ondansetron to ship 4/27/23 and deliver on 4/28/23*               Follow-up:   Kamini Kaye, Pharmacy Technician  Specialty Pharmacy Technician

## 2023-04-28 ENCOUNTER — SPECIALTY PHARMACY (OUTPATIENT)
Dept: PHARMACY | Facility: HOSPITAL | Age: 63
End: 2023-04-28
Payer: MEDICARE

## 2023-04-28 NOTE — PROGRESS NOTES
Specialty Pharmacy Refill Coordination Note     Joanne is a 63 y.o. female contacted today regarding refills of  Zejula specialty medication(s).    Reviewed and verified with patient:       Specialty medication(s) and dose(s) confirmed: yes        Delivery Questions    Flowsheet Row Most Recent Value   Delivery method FedEx  [FedEx standard no sig ship 5/1 deliver 5/2]   Delivery address correct? Yes   Preferred delivery time? Anytime   Number of medications in delivery 1   Medication being filled and delivered Zejula   Doses left of specialty medications 0- no start   Is there any medication that is due not being filled? No   Supplies needed? No supplies needed   Cooler needed? No   Do any medications need mixed or dated? No   Copay form of payment Payment plan already set up   Questions or concerns for the pharmacist? No   Are any medications first time fills? No                 Follow-up: 3 week(s)     Adrienne Beatty  Specialty Pharmacy Technician

## 2023-05-03 ENCOUNTER — TELEPHONE (OUTPATIENT)
Dept: GYNECOLOGIC ONCOLOGY | Facility: CLINIC | Age: 63
End: 2023-05-03
Payer: MEDICARE

## 2023-05-03 DIAGNOSIS — C56.9 MALIGNANT NEOPLASM OF OVARY, UNSPECIFIED LATERALITY: Primary | ICD-10-CM

## 2023-05-03 NOTE — TELEPHONE ENCOUNTER
I called patient to confirm that she received her zejula and she confirmed. She said she will take the 1st one tonight.     Patient would like to have labs done at [facility]. Office will send orders and fax over to [facility]. I explained to patient they accept walk-ins so she will get labs every week for the next 4 weeks (5/11, 5/18, 5/25, and 6/1). Patient verbalized understanding.

## 2023-05-08 ENCOUNTER — SPECIALTY PHARMACY (OUTPATIENT)
Dept: ENDOCRINOLOGY | Age: 63
End: 2023-05-08
Payer: MEDICARE

## 2023-05-08 NOTE — PROGRESS NOTES
Specialty Pharmacy Refill Coordination Note     Joanne is a 63 y.o. female contacted today regarding refills of  1 specialty medication(s).    Reviewed and verified with patient:       Specialty medication(s) and dose(s) confirmed: yes    Refill Questions    Flowsheet Row Most Recent Value   Changes to allergies? No   Changes to medications? No   New conditions since last clinic visit No   Unplanned office visit, urgent care, ED, or hospital admission in the last 4 weeks  No   How does patient/caregiver feel medication is working? Good   Financial problems or insurance changes  No   Since the previous refill, were any specialty medication doses or scheduled injections missed or delayed?  No   Does this patient require a clinical escalation to a pharmacist? No          Delivery Questions    Flowsheet Row Most Recent Value   Delivery method Other (Comment)  [FED EX SHIP 5/10/23 DELIVERY 5/11/23]   Delivery address correct? Yes   Delivery phone number [phone]   Preferred delivery time? Anytime   Number of medications in delivery 1   Medication being filled and delivered LANTUS SOLOSTAR   Doses left of specialty medications 1 WEEK   Is there any medication that is due not being filled? No   Supplies needed? No supplies needed   Cooler needed? Yes   Do any medications need mixed or dated? No   Additional comments $0   Questions or concerns for the pharmacist? No   Explain any questions or concerns for the pharmacist N/A   Are any medications first time fills? No   Tracking number for delivery N/A   Shipment status Cooler packed                 Follow-up: 33 day(s)     Kamini Kaye, Pharmacy Technician  Specialty Pharmacy Technician

## 2023-05-09 LAB
LAB AP CASE REPORT: NORMAL
LAB AP CLINICAL INFORMATION: NORMAL
LAB AP DIAGNOSIS COMMENT: NORMAL
LAB AP SPECIAL STAINS: NORMAL
Lab: NORMAL
PATH REPORT.ADDENDUM SPEC: NORMAL
PATH REPORT.FINAL DX SPEC: NORMAL
PATH REPORT.GROSS SPEC: NORMAL

## 2023-05-10 ENCOUNTER — SPECIALTY PHARMACY (OUTPATIENT)
Dept: PHARMACY | Facility: HOSPITAL | Age: 63
End: 2023-05-10
Payer: MEDICARE

## 2023-05-10 NOTE — PROGRESS NOTES
MTM telephone encounter re:adherence and side effects (Zejula)     Joanne reports starting Zejula on 5/3/23. Thus far, patient denies side effects, aside from fatigue and nausea, which is manageable and not bothersome to patient. Advised patient to alert office if this changes. Patient denies waking in the middle of the night from nausea, but reports feeling nauseated after eating breakfast.  Discussed patient can take Zofran first thing in the morning to prevent nausea from occurring.  Patient also reports itchy and dry skin after being out in sun.  Discussed some postmarketing reports of photosensitivity with Zejula therapy.  Recommend patient wear sunscreen and protective clothing if outside exposed to sunlight. Thus far, no missed doses and no medication changes. Advised pt to call office if any changes. Patient expressed understanding and had no additional questions at this time.       Kenyatta Hastings RPH  5/10/2023  15:28 EDT

## 2023-05-16 ENCOUNTER — TELEPHONE (OUTPATIENT)
Dept: GYNECOLOGIC ONCOLOGY | Facility: CLINIC | Age: 63
End: 2023-05-16
Payer: MEDICARE

## 2023-05-16 NOTE — TELEPHONE ENCOUNTER
I spoke to [facility] of [place], [place]. They asked me to fax referral for home health services and they would take a look to see if they would see patient. Fax #[phone]

## 2023-05-17 ENCOUNTER — DOCUMENTATION (OUTPATIENT)
Dept: GYNECOLOGIC ONCOLOGY | Facility: CLINIC | Age: 63
End: 2023-05-17
Payer: MEDICARE

## 2023-05-17 ENCOUNTER — TELEPHONE (OUTPATIENT)
Dept: GYNECOLOGIC ONCOLOGY | Age: 63
End: 2023-05-17

## 2023-05-17 NOTE — TELEPHONE ENCOUNTER
Caller: Joanne Contreras    Relationship: Self    Best call back number: [phone]      What was the call regarding: PATIENT CALLED SHE MISSED HER LAB APPOINTMENTS ON 5-11-23 IN [facility] AND SHE ISNT SURE WHAT TO DO NOW     Do you require a callback: YES

## 2023-05-18 DIAGNOSIS — C56.9 MALIGNANT NEOPLASM OF OVARY, UNSPECIFIED LATERALITY: Primary | ICD-10-CM

## 2023-05-19 ENCOUNTER — TELEPHONE (OUTPATIENT)
Dept: GYNECOLOGIC ONCOLOGY | Age: 63
End: 2023-05-19

## 2023-05-19 ENCOUNTER — OFFICE VISIT (OUTPATIENT)
Dept: GYNECOLOGIC ONCOLOGY | Facility: CLINIC | Age: 63
End: 2023-05-19
Payer: MEDICARE

## 2023-05-19 DIAGNOSIS — C57.00 FALLOPIAN TUBE CARCINOMA, UNSPECIFIED LATERALITY: Primary | ICD-10-CM

## 2023-05-19 NOTE — PROGRESS NOTES
*TELEHEALTH VISIT *     Patient consented to telephone visit for medical care today.   Total time spent reviewing images, labs, discussion and plan was 10 minutes.     Jelly Montalvo D.O  2023        Age: 63 y.o.  Sex: female  :  1960  MRN: [id number]     REFERRING PHYSICIAN: No ref. provider found    Joanne Contreras presents to [facility] for review of Zejula labs 3rd week of start. She is doing well, labs are stable to continue at current dose. C/o right hand numbness, fingetrips. May be related to h/o chemo vs diabetic neuropathy. States there is some loss of strength. She is taking gabapentin occasionally.       Oncology/Hematology History Overview Note   • 22: CT A/P - Findings suspicious for peritoneal carcinomatosis with ascites and metastatic disease to both ovaries. Recommend diagnostic paracentesis for cytology. Also noted is a porcelain gallbladder and pancreatic atrophy.  • 22: Paracentesis - rare atypical cells suspicious but not diagnostic for carcinoma. Reactive mesothelium & lymphocytes.   • 22: Pelvic US - The bilateral ovaries appear slightly enlarged and heterogeneous but no dominant adnexal masses are seen.  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   • 22: Paracentesis - removed 3400 mL of dark katiana fluid   • 22-3/17/23: 6 cycles Paclitaxel 175 mg/m2  Carboplatin AUC = 6   • 22: CT C/A/P - Interval response to therapy. Significant interval regression of abdominal and pelvic ascites with loculated features and associated peritoneal thickening and abnormal enhancement. Interval decrease in retroperitoneal adenopathy. No new evidence of solid organ metastasis. Dilated porcelain gallbladder with a rind of persistent soft tissue thickening similar to the prior study. No new biliary ductal dilatation. The ovaries appear less prominent and demonstrate interval decrease in abnormal  enhancement and cystic change. Advanced atherosclerotic change. Intermittent and/or absence of flow within the bilateral iliac systems. Evidence of prior vascular surgery  • 4/10/23: CT AP -  No significant interval change from the study performed in September. No new evidence of solid organ metastasis. No recurrent ascites. Peritoneal studding appears similar compared to the November examination. Redemonstration of a dilated porcelain gallbladder and adjacent rind of soft tissue measuring up to 1.5 cm. No new adnexal mass or drainable fluid collection. Advanced atherosclerotic change of the aorta and iliac systems.  • 5/7/23: CARIS HRD+, BRCA2 somatic mutation +, PDL1 +, ER+, p53 pathogenic variant. MSI stable, NTRK fusion not detected, ERBB2/HER2 not detected.   • 5/3/2023 - current: Zejula 100mg po daily - Labs from [facility]       5/18/23: TELEPHONE VISIT / FOLLOW UP           Fallopian tube carcinoma, unspecified laterality   9/19/2022 -  Other Event    CA 19-9: Less than 2.0      Other Event    CA-125  2/3/2023 30.5   1/13/2023 43.9   11/30/2022 70.2   10/26/2022 1182   10/19/2022 2059   9/19/2022 6024        Ovarian cancer   11/30/2022 Initial Diagnosis    Ovarian cancer     4/17/2023 Biopsy    OP OVARIAN Niraparib  Plan Provider: Jelly Montalvo DO  Treatment goal: Control  Line of treatment: [No plan line of treatment]         Past Medical History:  Past Medical History:   Diagnosis Date   • Anemia    • Coronary artery disease    • Diabetes mellitus    • Hyperlipidemia    • Hypertension    • Peripheral vascular disease, secondary    • Stroke 2022       Past Surgical History:  Past Surgical History:   Procedure Laterality Date   • ABOVE KNEE AMPUTATION Left    • BELOW KNEE AMPUTATION Right    • TOE SURGERY     • VENOUS ACCESS DEVICE (PORT) INSERTION Right 9/28/2022    Procedure: INSERTION VENOUS ACCESS DEVICE;  Surgeon: Adama Barlow MD;  Location: [facility];  Service: General;  Laterality:  Right;        Current Meds:    Current Outpatient Medications:   •  aspirin 81 MG chewable tablet, Chew 1 tablet Daily., Disp: , Rfl:   •  atorvastatin (LIPITOR) 20 MG tablet, Take 1 tablet by mouth Daily., Disp: , Rfl:   •  carvedilol (COREG) 3.125 MG tablet, Take 1 tablet by mouth 2 (Two) Times a Day With Meals., Disp: , Rfl:   •  Continuous Blood Gluc  (FreeStyle Dori 2 Wadsworth) device, 1 Device Daily., Disp: 1 each, Rfl: PRN  •  Continuous Blood Gluc Sensor (FreeStyle Dori 2 Sensor) misc, Apply 1 sensor to the appropriate area as directed Every 14 (Fourteen) Days., Disp: 6 each, Rfl: PRN  •  docusate sodium (COLACE) 100 MG capsule, Take 1 capsule by mouth 2 (Two) Times a Day., Disp: , Rfl:   •  gabapentin (NEURONTIN) 300 MG capsule, Take 1 capsule by mouth 3 (Three) Times a Day. (Patient taking differently: Take 1 capsule by mouth As Needed.), Disp: 90 capsule, Rfl: 3  •  Insulin Glargine (Lantus SoloStar) 100 UNIT/ML injection pen, Inject 60 under the skin in the appropriate area once daily in the AM., Disp: 30 mL, Rfl: 2  •  Insulin Lispro, 1 Unit Dial, (HumaLOG KwikPen) 100 UNIT/ML solution pen-injector, Inject 15 Units under the skin into the appropriate area as directed Daily With Breakfast, Lunch & Dinner. add additional for correction based on glucose and provided scale, Disp: 90 mL, Rfl: 3  •  Insulin Pen Needle (BD Pen Needle Avril 2nd Gen) 32G X 4 MM misc, 1 each 4 (Four) Times a Day., Disp: 400 each, Rfl: PRN  •  Multiple Vitamins-Minerals (MULTIVITAMIN WITH MINERALS) tablet tablet, Take 1 tablet by mouth Daily. (Patient not taking: Reported on 4/12/2023), Disp: , Rfl:   •  Niraparib Tosylate 100 MG capsule, Take 1 capsule by mouth Daily. Take with or without food.  Swallow whole, do not crush or dissolve., Disp: 30 capsule, Rfl: 5  •  O2 (OXYGEN), Inhale 2 L/min 1 (One) Time. (Patient not taking: Reported on 4/12/2023), Disp: , Rfl:   •  OLANZapine (ZyPREXA) 5 MG tablet, Take 1 tablet by  mouth Every Night. Take on days 2, 3 and 4 after chemotherapy. (Patient not taking: Reported on 3/17/2023), Disp: 3 tablet, Rfl: 5  •  ondansetron (ZOFRAN) 8 MG tablet, Take 1 tablet by mouth 30 minutes prior to cancer treatment daily and every 8 hours as needed for breakthrough nausea, Disp: 60 tablet, Rfl: 5  •  pantoprazole (PROTONIX) 20 MG EC tablet, , Disp: , Rfl:   •  pantoprazole (PROTONIX) 40 MG EC tablet, Take 40 mg by mouth Daily. (Patient not taking: Reported on 3/17/2023), Disp: , Rfl:   •  saccharomyces boulardii (FLORASTOR) 250 MG capsule, Take 1 capsule by mouth 2 (Two) Times a Day. (Patient not taking: Reported on 4/12/2023), Disp: , Rfl:   •  senna (SENOKOT) 8.6 MG tablet tablet, Take  by mouth As Needed., Disp: , Rfl:   No current facility-administered medications for this visit.    Facility-Administered Medications Ordered in Other Visits:   •  heparin injection 500 Units, 500 Units, Intravenous, PRN, Jelly Montalvo L, DO, 500 Units at 02/03/23 0908  •  sodium chloride 0.9 % flush 10 mL, 10 mL, Intravenous, PRN, Jelly Montalvo L, DO, 10 mL at 02/03/23 0908      ALLERGIES:  Allergies   Allergen Reactions   • Beta Adrenergic Blockers Other (See Comments)     Make PAD pain / claudication worse         ROS:  CONSTITUTIONAL:  Denies fever or chills.   NEUROLOGIC:  Denies headache, focal weakness or sensory changes.   EYES:  Denies change in visual acuity.  HEENT:  Denies nasal congestion or sore throat.   RESPIRATORY:  Denies cough or shortness of breath.   CARDIOVASCULAR:  Denies chest pain or edema.   GI:  Denies abdominal pain, nausea, vomiting, bloody stools or diarrhea.   :  Denies dysuria, leaking or incontinence.  MUSCULOSKELETAL:  Denies back pain or joint pain.   INTEGUMENT:  Denies rash.   ENDOCRINE:  Denies polyuria or polydipsia.   LYMPHATIC:  Denies swollen glands or lymphedema.   PSYCHIATRIC:  Denies depression or anxiety.      PHYSICAL EXAM:  There were no vitals filed for this  visit.  There is no height or weight on file to calculate BMI.      4/12/2023     8:44 AM   Current Status   ECOG score 2       Result Review :  The pertinent labs, images, and/or pathology as noted in the oncology history were reviewed independently and discussed with the patient.   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   • Deemed unacceptable surgical risk due to diffuse triple vessel disease and no opitons for PCI or surgical revascularization. Dr Arturo Vasquez is her cardiologist   • Carboplatin AUC 6 (480mg capped) / Taxol 175mg/m2 x 3C completed 3/17/23  • CT 4/10/23 Stability of peritoneal studding, no new lesions   • 5/3/23 - current : zejula 100mg po daily   • Uncontrolled DM - A1C 11 -  17U humalog AC, Lantus 68U BID   - Managed by Dr Latisha Mendes   • Porcelain gallbladder   • CAD - on ASA and statin  • HTN  • Peripheral vascular disease- post R BKA /  L AKA  • Noncomplaince affecting her care  • Difficulty coping        PLAN :  • Continue to closely follow with endocrinology - Dr Mendes follow up appt 7/2023  • Continue PO maintenance at this time Zejula 100mg po daily, weekly labs x 2 more before moving to monthly.   • Plan to continue treatment for now as long as she shows continued response  • Patient is terminal, palliative care consult has been placed.       - Will Cedeno (relative) [phone]  - Sherrie Rivero (relative) [phone]  - Reo Burgess (son) [phone]        CC: Jesi Spence [phone]          Jelly Montalvo D.O  5/19/2023    Gynecologic Oncology   [address] [place] [address]  [phone] office

## 2023-05-22 ENCOUNTER — PATIENT OUTREACH (OUTPATIENT)
Dept: OTHER | Facility: HOSPITAL | Age: 63
End: 2023-05-22
Payer: MEDICARE

## 2023-05-22 NOTE — PROGRESS NOTES
"Called patient. Introduced myself and explained I am covering for Dione while she was out.  It appears a duplicate navigation referral was submitted since Dione completed an initial assessment 3/31.    The patient has spoken with Piper HARTLEY on a few occasions. Uses [employer] to get to medical appointments in [place] and says medications are covered by insurance. She signed up for commodities and has been having some groceries delivered.  The patient states there are not a lot of assistance options where she lives. She declined needing to speak with [facility] at this time.     The patient states she is coping \"very very well\". She has been taking her oral cancer medication as prescribed. She dnies feeling anxious/depressed at this time. She does not want to speak with our behavioral  oncology team. She also declined referrals to [facility] and [facility].    The patient states she has no supportive oncology or other resource needs at this time.    She did not want to take my contact information of the phone. I will mail her a letter with my contact info and Dione's.         "

## 2023-05-23 ENCOUNTER — SPECIALTY PHARMACY (OUTPATIENT)
Dept: PHARMACY | Facility: HOSPITAL | Age: 63
End: 2023-05-23
Payer: MEDICARE

## 2023-05-23 DIAGNOSIS — C57.00 FALLOPIAN TUBE CARCINOMA, UNSPECIFIED LATERALITY: Primary | ICD-10-CM

## 2023-05-23 NOTE — PROGRESS NOTES
MTM Encounter 05/23/2023: Re: Photosensitivity      Today's encounter was conducted via phone.      Medication: Zejula (niraparib) 100 mg daily     Reason for outreach: discuss concern about sun sensitivity since starting medication  Administration: patient takes medication at same time daily  Missed doses: patient reports understanding of timely dosing   Questions or concerns about regimen: Patient has expressed concern with photosensitivity since starting Zejula. Patient was counseled that it was a common adverse reaction reported in post-market studies. Patient educated to use SPF 50+ sunscreen and wear sun protective clothing as needed.   Patient reports no new medications and no known drug allergies.      Medication list assessed for interactions: none of concern with Zejula     All questions addressed. Patient had no additional concerns for MTM office.      Lizzy Pink, PharmD Candidate

## 2023-05-23 NOTE — PROGRESS NOTES
Specialty Pharmacy Refill Coordination Note     Joanne is a 63 y.o. female contacted today regarding refills of  Zejula specialty medication(s).    Reviewed and verified with patient:       Specialty medication(s) and dose(s) confirmed: yes    Refill Questions    Flowsheet Row Most Recent Value   Changes to allergies? No   Changes to medications? No   New conditions since last clinic visit Yes  [Can't tolerate sun- gets sun burns easier than before]   Unplanned office visit, urgent care, ED, or hospital admission in the last 4 weeks  No   How does patient/caregiver feel medication is working? Good   Financial problems or insurance changes  No   Since the previous refill, were any specialty medication doses or scheduled injections missed or delayed?  No   Does this patient require a clinical escalation to a pharmacist? No          Delivery Questions    Flowsheet Row Most Recent Value   Delivery method Other (Comment)  [[employer] to home address ship 5/25 deliver 5/26]   Delivery address correct? Yes   Preferred delivery time? Anytime   Number of medications in delivery 1   Medication being filled and delivered Zejula   Doses left of specialty medications 1 week   Is there any medication that is due not being filled? No   Supplies needed? No supplies needed   Cooler needed? No   Do any medications need mixed or dated? No   Copay form of payment Payment plan already set up   Questions or concerns for the pharmacist? No   Are any medications first time fills? No                 Follow-up: 3 week(s)     Adrienne Beatty  Specialty Pharmacy Technician

## 2023-05-31 ENCOUNTER — SPECIALTY PHARMACY (OUTPATIENT)
Dept: ENDOCRINOLOGY | Age: 63
End: 2023-05-31

## 2023-05-31 NOTE — PROGRESS NOTES
Specialty Pharmacy Refill Coordination Note     Joanne is a 63 y.o. female contacted today regarding refills of  1 specialty medication(s).    Reviewed and verified with patient:       Specialty medication(s) and dose(s) confirmed: yes    Refill Questions    Flowsheet Row Most Recent Value   Changes to allergies? No   Changes to medications? No   New conditions since last clinic visit No   Unplanned office visit, urgent care, ED, or hospital admission in the last 4 weeks  No   How does patient/caregiver feel medication is working? Good   Financial problems or insurance changes  No   Since the previous refill, were any specialty medication doses or scheduled injections missed or delayed?  No   Does this patient require a clinical escalation to a pharmacist? No          Delivery Questions    Flowsheet Row Most Recent Value   Delivery method Other (Comment)  [[employer] SHIP 6/1/23 DELIVERY 6/2/23]   Delivery address correct? Yes   Delivery phone number [phone]   Preferred delivery time? Anytime   Number of medications in delivery 1   Medication being filled and delivered HUMALOG KWIKPEN   Doses left of specialty medications 1 WEEK   Is there any medication that is due not being filled? No   Supplies needed? No supplies needed   Cooler needed? Yes   Do any medications need mixed or dated? No   Additional comments $0   Questions or concerns for the pharmacist? No   Explain any questions or concerns for the pharmacist N/A   Are any medications first time fills? No   Tracking number for delivery N/A   Shipment status Cooler packed                 Follow-up: 60 day(s)     Kamini Kaye, Pharmacy Technician  Specialty Pharmacy Technician

## 2023-06-01 ENCOUNTER — TELEPHONE (OUTPATIENT)
Dept: GYNECOLOGIC ONCOLOGY | Age: 63
End: 2023-06-01

## 2023-06-01 NOTE — TELEPHONE ENCOUNTER
Caller: Joanne Contreras    Relationship: Self    Best call back number: [phone]    What is the best time to reach you: ASAP    Who are you requesting to speak with (clinical staff, provider,  specific staff member): MYAH    What was the call regarding: PT IS ASKING TO SPEAK WITH THE MEDICAL ASSISTANT, SHE HAS A NEW MEDICAL QUESTION.  SHE DID NOT DO HER LAB ON THE 11TH AS ADVISED, ONLY HAS HAD THEM ON THE 18TH AND 25TH AND TRYING TO GET ONE DONE TODAY. PLEASE CALL BACK TO DISCUSS IF SHE NEEDS TO DO A MAKEUP LAB FOR THE 4TH LAB REQUESTED PRIOR TO SEEING DR. BROWN ON THE 7TH, AND RESCHEDULE THAT FOR AFTERWARDS?  OR KEEP CURRENT SCHEDULE.

## 2023-06-05 ENCOUNTER — TELEPHONE (OUTPATIENT)
Dept: GYNECOLOGIC ONCOLOGY | Facility: CLINIC | Age: 63
End: 2023-06-05
Payer: MEDICARE

## 2023-06-05 NOTE — TELEPHONE ENCOUNTER
Caller: Joanne Contreras    Relationship to patient: Self    Best call back number: [phone]    Chief complaint: PATIENT CALLED SHE RECEIVED REMINDER CALL ABOUT APPOINTMENT, SHE STATED SHE WAS TOLD 10AM AND SHE ARRANGED FOR THE BUS AND APPOINTMENT IS AT 2, CAN THIS BE RESCHEDULED TO 10

## 2023-06-06 NOTE — TELEPHONE ENCOUNTER
Returned call to patient. We rescheduled her for the followin/7 10:00- [place]   10:15- Follow-up with Dr. Montalvo

## 2023-06-07 ENCOUNTER — INFUSION (OUTPATIENT)
Dept: ONCOLOGY | Facility: HOSPITAL | Age: 63
End: 2023-06-07
Payer: MEDICARE

## 2023-06-07 ENCOUNTER — SPECIALTY PHARMACY (OUTPATIENT)
Dept: ONCOLOGY | Facility: HOSPITAL | Age: 63
End: 2023-06-07
Payer: MEDICARE

## 2023-06-07 ENCOUNTER — APPOINTMENT (OUTPATIENT)
Dept: ONCOLOGY | Facility: HOSPITAL | Age: 63
End: 2023-06-07
Payer: MEDICARE

## 2023-06-07 DIAGNOSIS — Z45.9 ENCOUNTER FOR MANAGEMENT OF IMPLANTED DEVICE: Primary | ICD-10-CM

## 2023-06-07 DIAGNOSIS — C56.9 MALIGNANT NEOPLASM OF OVARY, UNSPECIFIED LATERALITY: ICD-10-CM

## 2023-06-07 LAB
ALBUMIN SERPL-MCNC: 3.6 G/DL (ref 3.5–5.2)
ALBUMIN/GLOB SERPL: 1 G/DL (ref 1.1–2.4)
ALP SERPL-CCNC: 127 U/L (ref 38–116)
ALT SERPL W P-5'-P-CCNC: 16 U/L (ref 0–33)
ANION GAP SERPL CALCULATED.3IONS-SCNC: 15.4 MMOL/L (ref 5–15)
AST SERPL-CCNC: 25 U/L (ref 0–32)
BASOPHILS # BLD AUTO: 0.06 10*3/MM3 (ref 0–0.2)
BASOPHILS NFR BLD AUTO: 0.9 % (ref 0–1.5)
BILIRUB SERPL-MCNC: 0.3 MG/DL (ref 0.2–1.2)
BUN SERPL-MCNC: 21 MG/DL (ref 6–20)
BUN/CREAT SERPL: 28 (ref 7.3–30)
CALCIUM SPEC-SCNC: 9.7 MG/DL (ref 8.5–10.2)
CANCER AG125 SERPL QL: 21.2 U/ML (ref 0–38.1)
CHLORIDE SERPL-SCNC: 104 MMOL/L (ref 98–107)
CO2 SERPL-SCNC: 20.6 MMOL/L (ref 22–29)
CREAT SERPL-MCNC: 0.75 MG/DL (ref 0.6–1.1)
DEPRECATED RDW RBC AUTO: 57.3 FL (ref 37–54)
EGFRCR SERPLBLD CKD-EPI 2021: 90.1 ML/MIN/1.73
EOSINOPHIL # BLD AUTO: 0.09 10*3/MM3 (ref 0–0.4)
EOSINOPHIL NFR BLD AUTO: 1.4 % (ref 0.3–6.2)
ERYTHROCYTE [DISTWIDTH] IN BLOOD BY AUTOMATED COUNT: 14.6 % (ref 12.3–15.4)
GLOBULIN UR ELPH-MCNC: 3.5 GM/DL (ref 1.8–3.5)
GLUCOSE SERPL-MCNC: 197 MG/DL (ref 74–124)
HCT VFR BLD AUTO: 41.7 % (ref 34–46.6)
HGB BLD-MCNC: 12.9 G/DL (ref 12–15.9)
IMM GRANULOCYTES # BLD AUTO: 0.02 10*3/MM3 (ref 0–0.05)
IMM GRANULOCYTES NFR BLD AUTO: 0.3 % (ref 0–0.5)
LYMPHOCYTES # BLD AUTO: 1.61 10*3/MM3 (ref 0.7–3.1)
LYMPHOCYTES NFR BLD AUTO: 24.7 % (ref 19.6–45.3)
MCH RBC QN AUTO: 33.2 PG (ref 26.6–33)
MCHC RBC AUTO-ENTMCNC: 30.9 G/DL (ref 31.5–35.7)
MCV RBC AUTO: 107.2 FL (ref 79–97)
MONOCYTES # BLD AUTO: 0.55 10*3/MM3 (ref 0.1–0.9)
MONOCYTES NFR BLD AUTO: 8.4 % (ref 5–12)
NEUTROPHILS NFR BLD AUTO: 4.2 10*3/MM3 (ref 1.7–7)
NEUTROPHILS NFR BLD AUTO: 64.3 % (ref 42.7–76)
NRBC BLD AUTO-RTO: 0 /100 WBC (ref 0–0.2)
PLATELET # BLD AUTO: 167 10*3/MM3 (ref 140–450)
PMV BLD AUTO: 10 FL (ref 6–12)
POTASSIUM SERPL-SCNC: 4.2 MMOL/L (ref 3.5–4.7)
PROT SERPL-MCNC: 7.1 G/DL (ref 6.3–8)
RBC # BLD AUTO: 3.89 10*6/MM3 (ref 3.77–5.28)
SODIUM SERPL-SCNC: 140 MMOL/L (ref 134–145)
WBC NRBC COR # BLD: 6.53 10*3/MM3 (ref 3.4–10.8)

## 2023-06-07 PROCEDURE — 85025 COMPLETE CBC W/AUTO DIFF WBC: CPT

## 2023-06-07 PROCEDURE — 36591 DRAW BLOOD OFF VENOUS DEVICE: CPT

## 2023-06-07 PROCEDURE — 80053 COMPREHEN METABOLIC PANEL: CPT

## 2023-06-07 PROCEDURE — 86304 IMMUNOASSAY TUMOR CA 125: CPT | Performed by: OBSTETRICS & GYNECOLOGY

## 2023-06-07 PROCEDURE — 25010000002 HEPARIN LOCK FLUSH PER 10 UNITS: Performed by: OBSTETRICS & GYNECOLOGY

## 2023-06-07 RX ORDER — SODIUM CHLORIDE 0.9 % (FLUSH) 0.9 %
10 SYRINGE (ML) INJECTION AS NEEDED
Status: DISCONTINUED | OUTPATIENT
Start: 2023-06-07 | End: 2023-06-07 | Stop reason: HOSPADM

## 2023-06-07 RX ORDER — SODIUM CHLORIDE 0.9 % (FLUSH) 0.9 %
10 SYRINGE (ML) INJECTION AS NEEDED
OUTPATIENT
Start: 2023-06-07

## 2023-06-07 RX ORDER — HEPARIN SODIUM (PORCINE) LOCK FLUSH IV SOLN 100 UNIT/ML 100 UNIT/ML
500 SOLUTION INTRAVENOUS AS NEEDED
Status: DISCONTINUED | OUTPATIENT
Start: 2023-06-07 | End: 2023-06-07 | Stop reason: HOSPADM

## 2023-06-07 RX ORDER — HEPARIN SODIUM (PORCINE) LOCK FLUSH IV SOLN 100 UNIT/ML 100 UNIT/ML
500 SOLUTION INTRAVENOUS AS NEEDED
OUTPATIENT
Start: 2023-06-07

## 2023-06-07 RX ADMIN — Medication 500 UNITS: at 10:06

## 2023-06-07 RX ADMIN — Medication 10 ML: at 10:06

## 2023-06-07 NOTE — PROGRESS NOTES
[facility] Pharmacy Encounter - Kaitlin    Met with patient today while she was in the office, face-to-face. Reinforced education that was previously provided. Gave patient a chance to ask questions. Chemo consents and collaborative care aggreement were signed at today's visit.  Patient expressed understanding and had no additional questions at this time.      Kenyatta Hastings RPH  6/7/2023  10:21 EDT

## 2023-06-14 DIAGNOSIS — C57.00 FALLOPIAN TUBE CARCINOMA, UNSPECIFIED LATERALITY: Primary | ICD-10-CM

## 2023-06-15 DIAGNOSIS — C57.00 FALLOPIAN TUBE CARCINOMA, UNSPECIFIED LATERALITY: Primary | ICD-10-CM

## 2023-06-19 RX ORDER — CARVEDILOL 3.12 MG/1
3.12 TABLET ORAL 2 TIMES DAILY WITH MEALS
Qty: 180 TABLET | Refills: 1 | Status: CANCELLED | OUTPATIENT
Start: 2023-06-19

## 2023-06-19 RX ORDER — ATORVASTATIN CALCIUM 20 MG/1
20 TABLET, FILM COATED ORAL DAILY
Qty: 90 TABLET | Refills: 1 | Status: CANCELLED | OUTPATIENT
Start: 2023-06-19

## 2023-06-19 NOTE — TELEPHONE ENCOUNTER
Specialty Pharmacy Patient Management Program  One-Time Clinical Outreach     Joanne Contreras is a 63 y.o. female seen by an Endocrinology provider for Type 2 Diabetes and enrolled in the Endocrinology Patient Management program offered by [facility].      A one-time clinical outreach was conducted when patient asked if we could fill atorvastatin and carvedilol. Medications are being transferred from [facility] in [place] and will then follow up with patient regarding delivery.     Elvira Huerta, PharmD, MM  Clinical Specialty Pharmacist, Endocrinology  6/19/2023  14:47 EDT

## 2023-07-24 ENCOUNTER — SPECIALTY PHARMACY (OUTPATIENT)
Dept: PHARMACY | Facility: HOSPITAL | Age: 63
End: 2023-07-24
Payer: MEDICARE

## 2023-07-24 ENCOUNTER — TELEPHONE (OUTPATIENT)
Dept: GYNECOLOGIC ONCOLOGY | Facility: CLINIC | Age: 63
End: 2023-07-24

## 2023-07-24 DIAGNOSIS — C56.9 MALIGNANT NEOPLASM OF OVARY, UNSPECIFIED LATERALITY: Primary | ICD-10-CM

## 2023-07-24 NOTE — PROGRESS NOTES
Specialty Pharmacy Refill Coordination Note     Joanne is a 63 y.o. female contacted today regarding refills of  Zejula specialty medication(s).    Reviewed and verified with patient:       Specialty medication(s) and dose(s) confirmed: yes    Refill Questions      Flowsheet Row Most Recent Value   Changes to allergies? No   Changes to medications? No   New conditions since last clinic visit No   Unplanned office visit, urgent care, ED, or hospital admission in the last 4 weeks  No   How does patient/caregiver feel medication is working? Good   Financial problems or insurance changes  No   Since the previous refill, were any specialty medication doses or scheduled injections missed or delayed?  No   Does this patient require a clinical escalation to a pharmacist? No            Delivery Questions      Flowsheet Row Most Recent Value   Delivery method Other (Comment)  [[employer] to pt home ship 7/27 deliver 7/28]   Delivery address correct? Yes   Preferred delivery time? Anytime   Number of medications in delivery 1   Medication being filled and delivered Zejula   Doses left of specialty medications 9 days   Is there any medication that is due not being filled? No   Supplies needed? No supplies needed   Cooler needed? No   Do any medications need mixed or dated? No   Copay form of payment Payment plan already set up   Questions or concerns for the pharmacist? No   Are any medications first time fills? No                   Follow-up: 3 week(s)     Adrienne Beatty  Specialty Pharmacy Technician

## 2023-07-24 NOTE — TELEPHONE ENCOUNTER
Caller: JOSH ALLISON/ INTERIM        Best call back number: [phone]    What was the call regarding: FACILITY CALLED REGARDING REFERRAL FOR HOME HEALTH, THEY ARE DENYING IT DUE TO STAFFING. THEY DON'T HAVE THE MAN POWER TO ACCOMMODATE

## 2023-08-02 ENCOUNTER — TELEPHONE (OUTPATIENT)
Dept: GYNECOLOGIC ONCOLOGY | Facility: CLINIC | Age: 63
End: 2023-08-02
Payer: MEDICARE

## 2023-08-02 ENCOUNTER — SPECIALTY PHARMACY (OUTPATIENT)
Dept: ENDOCRINOLOGY | Age: 63
End: 2023-08-02
Payer: MEDICARE

## 2023-08-09 ENCOUNTER — TELEPHONE (OUTPATIENT)
Dept: GYNECOLOGIC ONCOLOGY | Facility: CLINIC | Age: 63
End: 2023-08-09
Payer: MEDICARE

## 2023-08-09 ENCOUNTER — INFUSION (OUTPATIENT)
Dept: ONCOLOGY | Facility: HOSPITAL | Age: 63
End: 2023-08-09
Payer: MEDICARE

## 2023-08-09 ENCOUNTER — OFFICE VISIT (OUTPATIENT)
Dept: GYNECOLOGIC ONCOLOGY | Facility: CLINIC | Age: 63
End: 2023-08-09
Payer: MEDICARE

## 2023-08-09 VITALS
DIASTOLIC BLOOD PRESSURE: 92 MMHG | RESPIRATION RATE: 18 BRPM | SYSTOLIC BLOOD PRESSURE: 175 MMHG | TEMPERATURE: 97 F | OXYGEN SATURATION: 98 % | HEART RATE: 80 BPM

## 2023-08-09 DIAGNOSIS — C57.00 FALLOPIAN TUBE CARCINOMA, UNSPECIFIED LATERALITY: ICD-10-CM

## 2023-08-09 DIAGNOSIS — C56.9 MALIGNANT NEOPLASM OF OVARY, UNSPECIFIED LATERALITY: Primary | ICD-10-CM

## 2023-08-09 DIAGNOSIS — Z45.9 ENCOUNTER FOR MANAGEMENT OF IMPLANTED DEVICE: Primary | ICD-10-CM

## 2023-08-09 LAB
ALBUMIN SERPL-MCNC: 3.4 G/DL (ref 3.5–5.2)
ALBUMIN/GLOB SERPL: 1.2 G/DL
ALP SERPL-CCNC: 116 U/L (ref 39–117)
ALT SERPL W P-5'-P-CCNC: 11 U/L (ref 1–33)
ANION GAP SERPL CALCULATED.3IONS-SCNC: 15 MMOL/L (ref 5–15)
AST SERPL-CCNC: 25 U/L (ref 1–32)
BASOPHILS # BLD AUTO: 0.04 10*3/MM3 (ref 0–0.2)
BASOPHILS NFR BLD AUTO: 0.7 % (ref 0–1.5)
BILIRUB SERPL-MCNC: 0.2 MG/DL (ref 0–1.2)
BUN SERPL-MCNC: 12 MG/DL (ref 8–23)
BUN/CREAT SERPL: 15.6 (ref 7–25)
CALCIUM SPEC-SCNC: 8.7 MG/DL (ref 8.6–10.5)
CANCER AG125 SERPL QL: 25.4 U/ML (ref 0–38.1)
CHLORIDE SERPL-SCNC: 102 MMOL/L (ref 98–107)
CO2 SERPL-SCNC: 22 MMOL/L (ref 22–29)
CREAT SERPL-MCNC: 0.77 MG/DL (ref 0.6–1.1)
DEPRECATED RDW RBC AUTO: 51 FL (ref 37–54)
EGFRCR SERPLBLD CKD-EPI 2021: 87.3 ML/MIN/1.73
EOSINOPHIL # BLD AUTO: 0.09 10*3/MM3 (ref 0–0.4)
EOSINOPHIL NFR BLD AUTO: 1.6 % (ref 0.3–6.2)
ERYTHROCYTE [DISTWIDTH] IN BLOOD BY AUTOMATED COUNT: 13.3 % (ref 12.3–15.4)
GLOBULIN UR ELPH-MCNC: 2.8 GM/DL
GLUCOSE SERPL-MCNC: 270 MG/DL (ref 65–99)
HCT VFR BLD AUTO: 43.3 % (ref 34–46.6)
HGB BLD-MCNC: 13.9 G/DL (ref 12–15.9)
IMM GRANULOCYTES # BLD AUTO: 0.01 10*3/MM3 (ref 0–0.05)
IMM GRANULOCYTES NFR BLD AUTO: 0.2 % (ref 0–0.5)
LYMPHOCYTES # BLD AUTO: 1.56 10*3/MM3 (ref 0.7–3.1)
LYMPHOCYTES NFR BLD AUTO: 28.2 % (ref 19.6–45.3)
MCH RBC QN AUTO: 33.3 PG (ref 26.6–33)
MCHC RBC AUTO-ENTMCNC: 32.1 G/DL (ref 31.5–35.7)
MCV RBC AUTO: 103.8 FL (ref 79–97)
MONOCYTES # BLD AUTO: 0.45 10*3/MM3 (ref 0.1–0.9)
MONOCYTES NFR BLD AUTO: 8.1 % (ref 5–12)
NEUTROPHILS NFR BLD AUTO: 3.38 10*3/MM3 (ref 1.7–7)
NEUTROPHILS NFR BLD AUTO: 61.2 % (ref 42.7–76)
NRBC BLD AUTO-RTO: 0 /100 WBC (ref 0–0.2)
PLATELET # BLD AUTO: 134 10*3/MM3 (ref 140–450)
PMV BLD AUTO: 9.9 FL (ref 6–12)
POTASSIUM SERPL-SCNC: 4 MMOL/L (ref 3.5–5.2)
PROT SERPL-MCNC: 6.2 G/DL (ref 6–8.5)
RBC # BLD AUTO: 4.17 10*6/MM3 (ref 3.77–5.28)
SODIUM SERPL-SCNC: 139 MMOL/L (ref 136–145)
WBC NRBC COR # BLD: 5.53 10*3/MM3 (ref 3.4–10.8)

## 2023-08-09 PROCEDURE — 3080F DIAST BP >= 90 MM HG: CPT | Performed by: PHYSICIAN ASSISTANT

## 2023-08-09 PROCEDURE — 25010000002 HEPARIN LOCK FLUSH PER 10 UNITS: Performed by: OBSTETRICS & GYNECOLOGY

## 2023-08-09 PROCEDURE — 1159F MED LIST DOCD IN RCRD: CPT | Performed by: PHYSICIAN ASSISTANT

## 2023-08-09 PROCEDURE — 80053 COMPREHEN METABOLIC PANEL: CPT

## 2023-08-09 PROCEDURE — 86304 IMMUNOASSAY TUMOR CA 125: CPT | Performed by: OBSTETRICS & GYNECOLOGY

## 2023-08-09 PROCEDURE — 36591 DRAW BLOOD OFF VENOUS DEVICE: CPT

## 2023-08-09 PROCEDURE — 3077F SYST BP >= 140 MM HG: CPT | Performed by: PHYSICIAN ASSISTANT

## 2023-08-09 PROCEDURE — 85025 COMPLETE CBC W/AUTO DIFF WBC: CPT

## 2023-08-09 PROCEDURE — 1160F RVW MEDS BY RX/DR IN RCRD: CPT | Performed by: PHYSICIAN ASSISTANT

## 2023-08-09 RX ORDER — HEPARIN SODIUM (PORCINE) LOCK FLUSH IV SOLN 100 UNIT/ML 100 UNIT/ML
500 SOLUTION INTRAVENOUS AS NEEDED
Status: DISCONTINUED | OUTPATIENT
Start: 2023-08-09 | End: 2023-08-09 | Stop reason: HOSPADM

## 2023-08-09 RX ORDER — SODIUM CHLORIDE 0.9 % (FLUSH) 0.9 %
10 SYRINGE (ML) INJECTION AS NEEDED
Status: DISCONTINUED | OUTPATIENT
Start: 2023-08-09 | End: 2023-08-09 | Stop reason: HOSPADM

## 2023-08-09 RX ORDER — HEPARIN SODIUM (PORCINE) LOCK FLUSH IV SOLN 100 UNIT/ML 100 UNIT/ML
500 SOLUTION INTRAVENOUS AS NEEDED
OUTPATIENT
Start: 2023-08-09

## 2023-08-09 RX ORDER — SODIUM CHLORIDE 0.9 % (FLUSH) 0.9 %
10 SYRINGE (ML) INJECTION AS NEEDED
OUTPATIENT
Start: 2023-08-09

## 2023-08-09 RX ADMIN — Medication 10 ML: at 09:31

## 2023-08-09 RX ADMIN — Medication 500 UNITS: at 09:31

## 2023-08-09 NOTE — TELEPHONE ENCOUNTER
Left message for patient to call our office.    This is regarding her missed appointment for today.

## 2023-08-09 NOTE — PROGRESS NOTES
Age: 63 y.o.  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  No new complaints today.  States she is feeling well.  Denies pain.  No urinary or bowel complaints.  Tolerating diet without issue.  Denies N/V.  States she is taking Zejula and all other medications (Coreg/Lipitor/Insulin) as prescribed.  Appointment with Dr. Vasquez/cardiology middle of August to assess BP control.      Oncology/Hematology History Overview Note   22: CT A/P - Findings suspicious for peritoneal carcinomatosis with ascites and metastatic disease to both ovaries. Recommend diagnostic paracentesis for cytology. Also noted is a porcelain gallbladder and pancreatic atrophy.  22: Paracentesis - rare atypical cells suspicious but not diagnostic for carcinoma. Reactive mesothelium & lymphocytes.   22: Pelvic US - The bilateral ovaries appear slightly enlarged and heterogeneous but no dominant adnexal masses are seen.  22: CT Guided Omental Biopsy - + metastatic high grade carcinoma consistent with mullerian origin.   22: Paracentesis - removed 3400 mL of dark katiana fluid   22-3/17/23: 6 cycles Paclitaxel 175 mg/m2  Carboplatin AUC = 6   22: CT C/A/P - Interval response to therapy. Significant interval regression of abdominal and pelvic ascites with loculated features and associated peritoneal thickening and abnormal enhancement. Interval decrease in retroperitoneal adenopathy. No new evidence of solid organ metastasis. Dilated porcelain gallbladder with a rind of persistent soft tissue thickening similar to the prior study. No new biliary ductal dilatation. The ovaries appear less prominent and demonstrate interval decrease in abnormal enhancement and cystic change. Advanced atherosclerotic change. Intermittent and/or absence of flow within the  bilateral iliac systems. Evidence of prior vascular surgery  4/10/23: CT AP -  No significant interval change from the study performed in September. No new evidence of solid organ metastasis. No recurrent ascites. Peritoneal studding appears similar compared to the November examination. Redemonstration of a dilated porcelain gallbladder and adjacent rind of soft tissue measuring up to 1.5 cm. No new adnexal mass or drainable fluid collection. Advanced atherosclerotic change of the aorta and iliac systems.  5/7/23: CARIS HRD+, BRCA2 somatic mutation +, PDL1 +, ER+, p53 pathogenic variant. MSI stable, NTRK fusion not detected, ERBB2/HER2 not detected.   5/3/2023 - current: Zejula 100mg po daily - Labs from [facility]       8/9/23: Zejula follow up           Fallopian tube carcinoma, unspecified laterality   9/19/2022 -  Other Event    CA 19-9: Less than 2.0      Other Event    CA-125  2/3/2023 30.5   1/13/2023 43.9   11/30/2022 70.2   10/26/2022 1182   10/19/2022 2059   9/19/2022 6024      Ovarian cancer   11/30/2022 Initial Diagnosis    Ovarian cancer     4/17/2023 Biopsy    OP OVARIAN Niraparib  Plan Provider: Jelly Montalvo DO  Treatment goal: Control  Line of treatment: [No plan line of treatment]         Past Medical History:  Past Medical History:   Diagnosis Date    Anemia     Coronary artery disease     Diabetes mellitus     Hyperlipidemia     Hypertension     Peripheral vascular disease, secondary     Stroke 2022       Past Surgical History:  Past Surgical History:   Procedure Laterality Date    ABOVE KNEE AMPUTATION Left     BELOW KNEE AMPUTATION Right     TOE SURGERY      VENOUS ACCESS DEVICE (PORT) INSERTION Right 9/28/2022    Procedure: INSERTION VENOUS ACCESS DEVICE;  Surgeon: Adama Barlow MD;  Location: [facility];  Service: General;  Laterality: Right;        MEDICATIONS:    Current Outpatient Medications:     aspirin 81 MG chewable tablet, Chew 1 tablet Daily., Disp: , Rfl:      atorvastatin (LIPITOR) 20 MG tablet, Take 1 tablet by mouth Daily., Disp: , Rfl:     atorvastatin (LIPITOR) 20 MG tablet, Take 1 tablet by mouth Daily., Disp: 90 tablet, Rfl: 1    carvedilol (COREG) 3.125 MG tablet, Take 1 tablet by mouth 2 (Two) Times a Day With Meals., Disp: , Rfl:     carvedilol (COREG) 3.125 MG tablet, Take 1 tablet by mouth 2 (Two) Times a Day., Disp: 180 tablet, Rfl: 2    Continuous Blood Gluc  (FreeStyle Dori 2 [place]) device, 1 Device Daily., Disp: 1 each, Rfl: PRN    Continuous Blood Gluc Sensor (FreeStyle Dori 2 Sensor) misc, Apply 1 sensor to the appropriate area as directed Every 14 (Fourteen) Days., Disp: 6 each, Rfl: PRN    gabapentin (NEURONTIN) 300 MG capsule, Take 1 capsule by mouth 3 (Three) Times a Day. (Patient taking differently: Take 1 capsule by mouth As Needed.), Disp: 90 capsule, Rfl: 3    Insulin Glargine (Lantus SoloStar) 100 UNIT/ML injection pen, Inject 60 under the skin in the appropriate area once daily in the AM., Disp: 30 mL, Rfl: 2    Insulin Lispro, 1 Unit Dial, (HumaLOG KwikPen) 100 UNIT/ML solution pen-injector, Inject 15 Units under the skin Three Times Daily With Breakfast, Lunch & Dinner. Add units for correction based on glucose and provided scale. Max 81 units per day., Disp: 90 mL, Rfl: 3    Insulin Pen Needle (BD Pen Needle Avril 2nd Gen) 32G X 4 MM misc, Use 1 Pen Needle 4 (Four) Times a Day., Disp: 400 each, Rfl: PRN    Niraparib Tosylate 100 MG capsule, Take 1 capsule by mouth Daily. Take with or without food.  Swallow whole, do not crush or dissolve., Disp: 30 capsule, Rfl: 5    ondansetron (ZOFRAN) 8 MG tablet, Take 1 tablet by mouth 30 minutes prior to cancer treatment daily and every 8 hours as needed for breakthrough nausea, Disp: 60 tablet, Rfl: 5    senna (SENOKOT) 8.6 MG tablet tablet, Take  by mouth As Needed., Disp: , Rfl:     docusate sodium (COLACE) 100 MG capsule, Take 1 capsule by mouth 2 (Two) Times a Day. (Patient not taking:  Reported on 8/9/2023), Disp: , Rfl:     Multiple Vitamins-Minerals (MULTIVITAMIN WITH MINERALS) tablet tablet, Take 1 tablet by mouth Daily. (Patient not taking: Reported on 4/12/2023), Disp: , Rfl:     O2 (OXYGEN), Inhale 2 L/min 1 (One) Time. (Patient not taking: Reported on 4/12/2023), Disp: , Rfl:     OLANZapine (ZyPREXA) 5 MG tablet, Take 1 tablet by mouth Every Night. Take on days 2, 3 and 4 after chemotherapy. (Patient not taking: Reported on 3/17/2023), Disp: 3 tablet, Rfl: 5    pantoprazole (PROTONIX) 20 MG EC tablet, , Disp: , Rfl:     pantoprazole (PROTONIX) 40 MG EC tablet, Take 40 mg by mouth Daily. (Patient not taking: Reported on 3/17/2023), Disp: , Rfl:     saccharomyces boulardii (FLORASTOR) 250 MG capsule, Take 1 capsule by mouth 2 (Two) Times a Day. (Patient not taking: Reported on 4/12/2023), Disp: , Rfl:   No current facility-administered medications for this visit.    Facility-Administered Medications Ordered in Other Visits:     heparin injection 500 Units, 500 Units, Intravenous, PRN, Takimoto, Jelly L, DO, 500 Units at 02/03/23 0908    sodium chloride 0.9 % flush 10 mL, 10 mL, Intravenous, PRN, Takimoto, Jelly L, DO, 10 mL at 02/03/23 0908    ALLERGIES:  Allergies   Allergen Reactions    Beta Adrenergic Blockers Other (See Comments)     Make PAD pain / claudication worse         ROS:  As listed above, otherwise negative.      PHYSICAL EXAM:  Vitals:    08/09/23 0955   BP: 175/92   Pulse: 80   Resp: 18   Temp: 97 øF (36.1 øC)   TempSrc: Temporal   SpO2: 98%   Weight: Comment: unable to obtain   Height: Comment: unable to obtain   PainSc: 0-No pain       There is no height or weight on file to calculate BMI.        8/9/2023     9:53 AM   Current Status   ECOG score 2     PHQ-9 Total Score:           GEN: alert, normal affect, in no acute distress; left AKA, right BKA; in wheelchair  GYN: deferred          Result Review :  The pertinent labs, images, and/or pathology as noted in the oncology  history were reviewed independently and discussed with the patient.   Lorelei Nieto PA-C   08/09/2023      [facility] LABS:   WBC   Date Value Ref Range Status   08/09/2023 5.53 3.40 - 10.80 10*3/mm3 Final   08/02/2022 8.19 4.5 - 11.0 10*3/uL Final     RBC   Date Value Ref Range Status   08/09/2023 4.17 3.77 - 5.28 10*6/mm3 Final   08/02/2022 4.59 4.0 - 5.2 10*6/uL Final     Hemoglobin   Date Value Ref Range Status   08/09/2023 13.9 12.0 - 15.9 g/dL Final   08/02/2022 13.4 12.0 - 16.0 g/dL Final     Hematocrit   Date Value Ref Range Status   08/09/2023 43.3 34.0 - 46.6 % Final   08/02/2022 43.4 36.0 - 46.0 % Final     Platelets   Date Value Ref Range Status   08/09/2023 134 (L) 140 - 450 10*3/mm3 Final   08/02/2022 267 140 - 440 10*3/uL Final        Date Value Ref Range Status   06/07/2023 21.2 0.0 - 38.1 U/mL Final       [facility] IMAGING:  No radiology results for the last 90 days.          ASSESSMENT :  HG mullerian carcinoma - dx via omental bx 9/23/22  Paracentesis 3400mL  9/26/22,   = 6024 at diangosis  Carboplatin AUC 6 (550mg capped) /  Taxol 175mg/m2 x 3C   CT with improvement in lesion size, resolution of ascites.   Deemed unacceptable surgical risk due to diffuse triple vessel disease and no opitons for PCI or surgical revascularization. Dr Arturo Vasquez is her cardiologist   Carboplatin AUC 6 (480mg capped) / Taxol 175mg/m2 x 3C completed 3/17/23  CT 4/10/23 Stability of peritoneal studding, no new lesions   5/3/23 - current : zejula 100mg po daily   Uncontrolled DM - A1C 11 -  17U humalog AC, Lantus 68U BID   Managed by Dr Latisha Mendes   Porcelain gallbladder   CAD - on ASA and statin  HTN  Peripheral vascular disease- post R BKA /  L AKA  Noncomplaince affecting her care  Difficulty coping            PLAN :  Continue on Zejula as prescribed, will plan for monthly Zejula labs and follow up   Repeat CT- no imaging since April 2023  Importance of taking daily medications reiterated to pt  Continue  to closely follow with endocrinology - Dr Mendes  Keep appointment with Dr. Vasquez/cardiology as scheduled  Plan to continue treatment for now as long as she shows continued response  Patient is terminal, palliative care consult has been placed      Will Cedeno (relative) [phone]  Sherrie Rivero (relative) [phone]  Roe Burgess (son) [phone]        CC: Jesi Spence [phone]            Gynecologic Oncology   [address] [place] [address]  [phone] office

## 2023-08-14 ENCOUNTER — PATIENT OUTREACH (OUTPATIENT)
Dept: OTHER | Facility: HOSPITAL | Age: 63
End: 2023-08-14
Payer: MEDICARE

## 2023-08-14 NOTE — PROGRESS NOTES
Called patient. [facility] out of [place] takes her to/from her appts.  The patient is doing well with her oral medication.     She wasn't sure if she heard back from Tegan Fong @ [facility] about the waiver program. The patient filled out an application before; Tegan was going to look into it and call her back.  Provided her with Tegan's number.     I will close her case to navigation.  Gave her my name/number to call in the future if the need arises. Patient verbalized understanding.

## 2023-08-18 ENCOUNTER — SPECIALTY PHARMACY (OUTPATIENT)
Dept: PHARMACY | Facility: HOSPITAL | Age: 63
End: 2023-08-18
Payer: MEDICARE

## 2023-08-18 NOTE — PROGRESS NOTES
Re: Refills of Oral Specialty Medication - Zejula (niraparib)    Drug-Drug Interactions: The current medication list was reviewed and there are no relevant drug-drug interactions.  Medication Allergies: The patient has no relevant allergies as it relates to their oral specialty medication  Review of Labs/Dose Adjustments: NO DOSE CHANGE - I reviewed the most recent note and labs and the patient will continue without any dose changes.  I sent refills as described below. Formulation change due per     Drug: Zejula (niraparib)  Strength: 100 mg  Directions: Take 1 tablet by mouth daily  Quantity: 30  Refills: 5  Pharmacy prescription sent to:  [facility]    Name/Credentials: Eb Hastings, Enedelia, BCFLACO    8/18/2023  15:42 EDT

## 2023-08-21 ENCOUNTER — SPECIALTY PHARMACY (OUTPATIENT)
Dept: ENDOCRINOLOGY | Age: 63
End: 2023-08-21
Payer: MEDICARE

## 2023-08-21 ENCOUNTER — SPECIALTY PHARMACY (OUTPATIENT)
Dept: PHARMACY | Facility: HOSPITAL | Age: 63
End: 2023-08-21
Payer: MEDICARE

## 2023-08-21 NOTE — PROGRESS NOTES
PA for Zejula 100 mg tablets approved for one year.      Adrienne Beatty  Specialty Pharmacy Technician

## 2023-08-21 NOTE — PROGRESS NOTES
Specialty Pharmacy Patient Management Program  Endocrinology Refill Outreach      Joanne is a 63 y.o. female contacted today regarding refills of her medication(s).    Specialty medication(s) and dose(s) confirmed: N/A  Other medication(s) being refilled: Carvedilol and lisinopril    Refill Questions      Flowsheet Row Most Recent Value   Changes to allergies? No   Changes to medications? Yes  [Increased carvedilol dose]   New conditions since last clinic visit No   Unplanned office visit, urgent care, ED, or hospital admission in the last 4 weeks  No   How does patient/caregiver feel medication is working? Very good   Financial problems or insurance changes  No   Since the previous refill, were any specialty medication doses or scheduled injections missed or delayed?  No   Does this patient require a clinical escalation to a pharmacist? No          Delivery Questions      Flowsheet Row Most Recent Value   Delivery method Other (Comment)  [Being delivered from [place] with [employer]]   Delivery address correct? Yes   Delivery phone number [phone]   Preferred delivery time? Anytime   Number of medications in delivery 2   Medication being filled and delivered Carvedilol and lisinopril   Doses left of specialty medications N/A   Is there any medication that is due not being filled? No   Cooler needed? No   Do any medications need mixed or dated? No   Additional comments $0   Questions or concerns for the pharmacist? No   Are any medications first time fills? No                Follow-Up: 9/15 for future fills    Elvira Huerta PharmD, MM   Clinical Speciality Pharmacist, Endocrinology   8/21/2023  14:24 EDT

## 2023-08-21 NOTE — PROGRESS NOTES
Drug: Zejula (niraparib)  Strength: 100 mg  Directions: Take 1 tablet by mouth daily  Quantity: 30  Refills: 5  Pharmacy prescription sent to:  [facility]  Completed independent double check on medication order/RX.   Ashlee Traore, Dawn, BCOP

## 2023-08-21 NOTE — PROGRESS NOTES
Specialty Pharmacy Refill Coordination Note     Joanne is a 63 y.o. female contacted today regarding refills of  Zejula specialty medication(s).    Reviewed and verified with patient:       Specialty medication(s) and dose(s) confirmed: yes    Refill Questions      Flowsheet Row Most Recent Value   Changes to allergies? No   Changes to medications? No  [Zejula now a tablet]   New conditions since last clinic visit No   Unplanned office visit, urgent care, ED, or hospital admission in the last 4 weeks  No   How does patient/caregiver feel medication is working? Good   Financial problems or insurance changes  No   Since the previous refill, were any specialty medication doses or scheduled injections missed or delayed?  No   Does this patient require a clinical escalation to a pharmacist? No            Delivery Questions      Flowsheet Row Most Recent Value   Delivery method Other (Comment)  [[employer] to pt home ship 8/22 deliver 8/23]   Delivery address correct? Yes   Preferred delivery time? Anytime   Number of medications in delivery 1   Medication being filled and delivered Zejula   Doses left of specialty medications 8 days   Is there any medication that is due not being filled? No   Supplies needed? No supplies needed   Cooler needed? No   Do any medications need mixed or dated? No   Copay form of payment Payment plan already set up   Questions or concerns for the pharmacist? No   Are any medications first time fills? No                   Follow-up: 3 week(s)     Adrienne Beatty  Specialty Pharmacy Technician

## 2023-08-22 ENCOUNTER — TELEPHONE (OUTPATIENT)
Dept: GYNECOLOGIC ONCOLOGY | Facility: CLINIC | Age: 63
End: 2023-08-22
Payer: MEDICARE

## 2023-08-22 NOTE — TELEPHONE ENCOUNTER
Spoke to patient to let her know that documents are waiting for Dr. Montalvo's signature before they can be faxed back. Patient stated verbal understanding.

## 2023-08-22 NOTE — TELEPHONE ENCOUNTER
Caller: Joanne Contreras    Relationship: Self    Best call back number: [phone]     What is the best time to reach you: ANYTIME    Who are you requesting to speak with (clinical staff, provider,  specific staff member): CLINICAL    What was the call regarding: PT IS DUE TO GO TO [place] ON 08/30/23 TO HAVE A CT SCAN DONE. THE PT WAS TOLD BY THE BUS SERVICE THAT SHE USES THAT SHE NEEDS A REFERRAL SENT OVER FOR THIS VISIT.     [facility]    Is it okay if the provider responds through [facility]: NO - PLEASE CALL BACK.

## 2023-08-28 RX ORDER — INSULIN GLARGINE 100 [IU]/ML
60 INJECTION, SOLUTION SUBCUTANEOUS EVERY MORNING
Qty: 30 ML | Refills: 2 | Status: SHIPPED | OUTPATIENT
Start: 2023-08-28

## 2023-08-28 NOTE — TELEPHONE ENCOUNTER
Rx Refill Note  Requested Prescriptions     Pending Prescriptions Disp Refills    Insulin Glargine (Lantus SoloStar) 100 UNIT/ML injection pen 30 mL 2     Sig: Inject 60 under the skin in the appropriate area once daily in the AM.      Last office visit with prescribing clinician: 4/5/2023   Last telemedicine visit with prescribing clinician: Visit date not found   Next office visit with prescribing clinician: Visit date not found                         Would you like a call back once the refill request has been completed: [] Yes [] No    If the office needs to give you a call back, can they leave a voicemail: [] Yes [] No    Talya Manning  08/28/23, 09:14 EDT

## 2023-08-30 ENCOUNTER — HOSPITAL ENCOUNTER (OUTPATIENT)
Dept: CT IMAGING | Facility: HOSPITAL | Age: 63
Discharge: HOME | End: 2023-08-30
Admitting: OBSTETRICS & GYNECOLOGY
Payer: MEDICARE

## 2023-08-30 DIAGNOSIS — C56.9 MALIGNANT NEOPLASM OF OVARY, UNSPECIFIED LATERALITY: ICD-10-CM

## 2023-08-30 PROCEDURE — 71260 CT THORAX DX C+: CPT

## 2023-08-30 PROCEDURE — 74177 CT ABD & PELVIS W/CONTRAST: CPT

## 2023-08-30 PROCEDURE — 25510000001 IOPAMIDOL 61 % SOLUTION: Performed by: OBSTETRICS & GYNECOLOGY

## 2023-08-30 RX ADMIN — IOPAMIDOL 100 ML: 612 INJECTION, SOLUTION INTRAVENOUS at 13:28

## 2023-09-01 DIAGNOSIS — C56.9 MALIGNANT NEOPLASM OF OVARY, UNSPECIFIED LATERALITY: Primary | ICD-10-CM

## 2023-09-06 ENCOUNTER — SPECIALTY PHARMACY (OUTPATIENT)
Dept: ENDOCRINOLOGY | Age: 63
End: 2023-09-06
Payer: MEDICARE

## 2023-09-06 NOTE — PROGRESS NOTES
Specialty Pharmacy Patient Management Program  Endocrinology Refill Outreach      Joanne is a 63 y.o. female contacted today regarding refills of her medication(s).    Specialty medication(s) and dose(s) confirmed: Yes    Refill Questions      Flowsheet Row Most Recent Value   Changes to allergies? No   Changes to medications? No   Unplanned office visit, urgent care, ED, or hospital admission in the last 4 weeks  No   How does patient/caregiver feel medication is working? Very good   Financial problems or insurance changes  No   Since the previous refill, were any specialty medication doses or scheduled injections missed or delayed?  No   Does this patient require a clinical escalation to a pharmacist? No          Delivery Questions      Flowsheet Row Most Recent Value   Delivery method [employer]   Delivery address correct? Yes   Delivery phone number [phone]   Preferred delivery time? Anytime   Number of medications in delivery 1   Medication being filled and delivered FreeStyle Dori 2   Is there any medication that is due not being filled? No   Supplies needed? No supplies needed   Cooler needed? No   Do any medications need mixed or dated? No   Additional comments $0   Questions or concerns for the pharmacist? No   Are any medications first time fills? No   Shipment status Delivery complete                Follow-Up: in 2 weeks    Brandi Crawhorn, PharmD, BCPS, BCCCP  9/6/2023 10:36 EDT

## 2023-09-15 ENCOUNTER — OFFICE VISIT (OUTPATIENT)
Dept: GYNECOLOGIC ONCOLOGY | Facility: CLINIC | Age: 63
End: 2023-09-15
Payer: MEDICARE

## 2023-09-15 VITALS
TEMPERATURE: 97.8 F | RESPIRATION RATE: 16 BRPM | OXYGEN SATURATION: 98 % | DIASTOLIC BLOOD PRESSURE: 77 MMHG | SYSTOLIC BLOOD PRESSURE: 130 MMHG | HEART RATE: 73 BPM

## 2023-09-15 DIAGNOSIS — C57.00 FALLOPIAN TUBE CARCINOMA, UNSPECIFIED LATERALITY: Primary | ICD-10-CM

## 2023-09-15 NOTE — PROGRESS NOTES
Age: 63 y.o.  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 Continuing to feel well today.   Denies pain.  No bowel complaints.  States she was recently treated for UTI though unsure of antibiotic.  Tolerating diet without issue.  Denies N/V.  States she is taking Zejula and all other medications (Coreg/Lipitor/Insulin) as prescribed.       Oncology/Hematology History Overview Note   22: CT A/P - Findings suspicious for peritoneal carcinomatosis with ascites and metastatic disease to both ovaries. Recommend diagnostic paracentesis for cytology. Also noted is a porcelain gallbladder and pancreatic atrophy.  22: Paracentesis - rare atypical cells suspicious but not diagnostic for carcinoma. Reactive mesothelium & lymphocytes.   22: Pelvic US - The bilateral ovaries appear slightly enlarged and heterogeneous but no dominant adnexal masses are seen.  22: CT Guided Omental Biopsy - + metastatic high grade carcinoma consistent with mullerian origin.   22: Paracentesis - removed 3400 mL of dark katiana fluid   22-3/17/23: 6 cycles Paclitaxel 175 mg/m2  Carboplatin AUC = 6   22: CT C/A/P - Interval response to therapy. Significant interval regression of abdominal and pelvic ascites with loculated features and associated peritoneal thickening and abnormal enhancement. Interval decrease in retroperitoneal adenopathy. No new evidence of solid organ metastasis. Dilated porcelain gallbladder with a rind of persistent soft tissue thickening similar to the prior study. No new biliary ductal dilatation. The ovaries appear less prominent and demonstrate interval decrease in abnormal enhancement and cystic change. Advanced atherosclerotic change. Intermittent and/or absence of flow within the bilateral iliac systems. Evidence of prior  vascular surgery  4/10/23: CT AP -  No significant interval change from the study performed in September. No new evidence of solid organ metastasis. No recurrent ascites. Peritoneal studding appears similar compared to the November examination. Redemonstration of a dilated porcelain gallbladder and adjacent rind of soft tissue measuring up to 1.5 cm. No new adnexal mass or drainable fluid collection. Advanced atherosclerotic change of the aorta and iliac systems.  5/7/23: CARIS HRD+, BRCA2 somatic mutation +, PDL1 +, ER+, p53 pathogenic variant. MSI stable, NTRK fusion not detected, ERBB2/HER2 not detected.   5/3/2023 - current: Zejula 100mg po daily - Labs from [facility]   8/30/23: CT AP - Stable subtle peritoneal nodular disease in the left upper quadrant greater omentum without significant change since the most recent prior study. No evidence of new or progressive metastatic disease. No evidence of local tumor recurrence in the adnexal regions. Chronic calcified, segmentally thickened porcelain gallbladder unchanged.       8/9/23: Zejula follow up / Labs/ CT Review           Fallopian tube carcinoma, unspecified laterality   9/19/2022 -  Other Event    CA 19-9: Less than 2.0      Other Event    CA-125  2/3/2023 30.5   1/13/2023 43.9   11/30/2022 70.2   10/26/2022 1182   10/19/2022 2059   9/19/2022 6024      Ovarian cancer   11/30/2022 Initial Diagnosis    Ovarian cancer     4/17/2023 Biopsy    OP OVARIAN Niraparib  Plan Provider: Jelly Montalvo DO  Treatment goal: Control  Line of treatment: [No plan line of treatment]         Past Medical History:  Past Medical History:   Diagnosis Date    Anemia     Coronary artery disease     Diabetes mellitus     Hyperlipidemia     Hypertension     Peripheral vascular disease, secondary     Stroke 2022       Past Surgical History:  Past Surgical History:   Procedure Laterality Date    ABOVE KNEE AMPUTATION Left     BELOW KNEE AMPUTATION Right     TOE SURGERY       VENOUS ACCESS DEVICE (PORT) INSERTION Right 9/28/2022    Procedure: INSERTION VENOUS ACCESS DEVICE;  Surgeon: Adama Barlow MD;  Location: [facility];  Service: General;  Laterality: Right;        MEDICATIONS:    Current Outpatient Medications:     aspirin 81 MG chewable tablet, Chew 1 tablet Daily., Disp: , Rfl:     atorvastatin (LIPITOR) 20 MG tablet, Take 1 tablet by mouth Daily., Disp: , Rfl:     atorvastatin (LIPITOR) 20 MG tablet, Take 1 tablet by mouth Daily., Disp: 90 tablet, Rfl: 1    carvedilol (COREG) 6.25 MG tablet, Take 1 tablet by mouth 2 (two) times daily with meals., Disp: 60 tablet, Rfl: 3    carvedilol (COREG) 6.25 MG tablet, Take 1 tablet by mouth 2 (Two) Times a Day With Meals., Disp: 60 tablet, Rfl: 5    Continuous Blood Gluc  (FreeStyle Dori 2 [place]) device, 1 Device Daily., Disp: 1 each, Rfl: PRN    Continuous Blood Gluc Sensor (FreeStyle Dori 2 Sensor) misc, Apply 1 sensor to the appropriate area as directed Every 14 (Fourteen) Days., Disp: 6 each, Rfl: PRN    gabapentin (NEURONTIN) 300 MG capsule, Take 1 capsule by mouth 3 (Three) Times a Day. (Patient taking differently: Take 1 capsule by mouth As Needed.), Disp: 90 capsule, Rfl: 3    Insulin Glargine (Lantus SoloStar) 100 UNIT/ML injection pen, Inject 60 Units under the skin into the appropriate area as directed Every Morning., Disp: 30 mL, Rfl: 2    Insulin Lispro, 1 Unit Dial, (HumaLOG KwikPen) 100 UNIT/ML solution pen-injector, Inject 15 Units under the skin Three Times Daily With Breakfast, Lunch & Dinner. Add units for correction based on glucose and provided scale. Max 81 units per day., Disp: 90 mL, Rfl: 3    Insulin Pen Needle (BD Pen Needle Avril 2nd Gen) 32G X 4 MM misc, Use 1 Pen Needle 4 (Four) Times a Day., Disp: 400 each, Rfl: PRN    Multiple Vitamins-Minerals (MULTIVITAMIN WITH MINERALS) tablet tablet, Take 1 tablet by mouth Daily., Disp: , Rfl:     Niraparib Tosylate (ZEJULA) 100 MG tablet, Take 1 tablet  by mouth Daily., Disp: 30 tablet, Rfl: 5    ondansetron (ZOFRAN) 8 MG tablet, Take 1 tablet by mouth 30 minutes prior to cancer treatment daily and every 8 hours as needed for breakthrough nausea, Disp: 60 tablet, Rfl: 5    carvedilol (COREG) 3.125 MG tablet, Take 1 tablet by mouth 2 (Two) Times a Day With Meals. (Patient not taking: Reported on 9/15/2023), Disp: , Rfl:     carvedilol (COREG) 3.125 MG tablet, Take 1 tablet by mouth 2 (Two) Times a Day. (Patient not taking: Reported on 9/15/2023), Disp: 180 tablet, Rfl: 2    docusate sodium (COLACE) 100 MG capsule, Take 1 capsule by mouth 2 (Two) Times a Day. (Patient not taking: Reported on 8/9/2023), Disp: , Rfl:     lisinopril (PRINIVIL,ZESTRIL) 20 MG tablet, Take 1 tablet by mouth Daily. (Patient not taking: Reported on 9/15/2023), Disp: 30 tablet, Rfl: 3    O2 (OXYGEN), Inhale 2 L/min 1 (One) Time. (Patient not taking: Reported on 4/12/2023), Disp: , Rfl:     OLANZapine (ZyPREXA) 5 MG tablet, Take 1 tablet by mouth Every Night. Take on days 2, 3 and 4 after chemotherapy. (Patient not taking: Reported on 3/17/2023), Disp: 3 tablet, Rfl: 5    pantoprazole (PROTONIX) 20 MG EC tablet, , Disp: , Rfl:     pantoprazole (PROTONIX) 40 MG EC tablet, Take 40 mg by mouth Daily. (Patient not taking: Reported on 3/17/2023), Disp: , Rfl:     saccharomyces boulardii (FLORASTOR) 250 MG capsule, Take 1 capsule by mouth 2 (Two) Times a Day. (Patient not taking: Reported on 4/12/2023), Disp: , Rfl:     senna (SENOKOT) 8.6 MG tablet tablet, Take  by mouth As Needed. (Patient not taking: Reported on 9/15/2023), Disp: , Rfl:   No current facility-administered medications for this visit.    Facility-Administered Medications Ordered in Other Visits:     heparin injection 500 Units, 500 Units, Intravenous, PRN, Jelly Montalvo, , 500 Units at 02/03/23 0908    sodium chloride 0.9 % flush 10 mL, 10 mL, Intravenous, PRN, Jelly Montalvo, , 10 mL at 02/03/23  0908    ALLERGIES:  Allergies   Allergen Reactions    Beta Adrenergic Blockers Other (See Comments)     Make PAD pain / claudication worse         ROS:  As listed above, otherwise negative.      PHYSICAL EXAM:  Vitals:    09/15/23 1137   BP: 130/77   Pulse: 73   Resp: 16   Temp: 97.8 °F (36.6 °C)   TempSrc: Temporal   SpO2: 98%   Weight: Comment: unable to retain   Height: Comment: unable to retain   PainSc:   4   PainLoc: Shoulder       There is no height or weight on file to calculate BMI.        9/15/2023    11:33 AM   Current Status   ECOG score 2     PHQ-9 Total Score:           GEN: alert, normal affect, in no acute distress; left AKA, right BKA; in wheelchair  GYN: deferred      No change to exam today. Lorelei Nieto PA-C      Result Review :  The pertinent labs, images, and/or pathology as noted in the oncology history were reviewed independently and discussed with the patient.   Lorelei Nieto PA-C   08/09/2023      [facility]:   WBC   Date Value Ref Range Status   08/09/2023 5.53 3.40 - 10.80 10*3/mm3 Final   08/02/2022 8.19 4.5 - 11.0 10*3/uL Final     RBC   Date Value Ref Range Status   08/09/2023 4.17 3.77 - 5.28 10*6/mm3 Final   08/02/2022 4.59 4.0 - 5.2 10*6/uL Final     Hemoglobin   Date Value Ref Range Status   08/09/2023 13.9 12.0 - 15.9 g/dL Final   08/02/2022 13.4 12.0 - 16.0 g/dL Final     Hematocrit   Date Value Ref Range Status   08/09/2023 43.3 34.0 - 46.6 % Final   08/02/2022 43.4 36.0 - 46.0 % Final     Platelets   Date Value Ref Range Status   08/09/2023 134 (L) 140 - 450 10*3/mm3 Final   08/02/2022 267 140 - 440 10*3/uL Final        Date Value Ref Range Status   08/09/2023 25.4 0.0 - 38.1 U/mL Final       [facility]:  CT Chest With Contrast Diagnostic    Result Date: 8/31/2023  No evidence of thoracic metastatic disease.  This report was finalized on 8/31/2023 3:21 PM by Dr. Eloy Canseco MD.      CT Abdomen Pelvis With Contrast    Result Date: 8/31/2023  1.  Stable subtle  peritoneal nodular disease in the left upper quadrant greater omentum without significant change since the most recent prior study. No evidence of new or progressive metastatic disease. 2.  No evidence of local tumor recurrence in the adnexal regions. 3.  Chronic calcified, segmentally thickened porcelain gallbladder, unchanged.   This report was finalized on 8/31/2023 3:32 PM by Dr. Eloy Canseco MD.             ASSESSMENT :  HG mullerian carcinoma - dx via omental bx 9/23/22  Paracentesis 3400mL  9/26/22,   = 6024 at diangosis  Carboplatin AUC 6 (550mg capped) /  Taxol 175mg/m2 x 3C   CT with improvement in lesion size, resolution of ascites.   Deemed unacceptable surgical risk due to diffuse triple vessel disease and no opitons for PCI or surgical revascularization. Dr Arturo Vasquez is her cardiologist   Carboplatin AUC 6 (480mg capped) / Taxol 175mg/m2 x 3C completed 3/17/23  CT 4/10/23 Stability of peritoneal studding, no new lesions   5/3/23 - current : zejula 100mg po daily   8/30/23: CT CAP- stable disease  Uncontrolled DM - A1C 11 -  17U humalog AC, Lantus 68U BID   Managed by Dr Latisha Mendes   Porcelain gallbladder   CAD - on ASA and statin  HTN  Peripheral vascular disease- post R BKA /  L AKA  Noncomplaince affecting her care  Difficulty coping          PLAN :  CT reviewed with pt, no new changes  Continue on Zejula as prescribed  Importance of taking daily medications reiterated to pt  Continue to closely follow with endocrinology - Dr Mendes  Keep appointment with Dr. Vasquez/cardiology as scheduled  Pt informed Dr. Montalvo is no longer with [facility]  She was given option to follow up with gyn onc at [facility] vs [facility].  I will place a referral for pt to be seen at [facility], per pt request. Pt voiced understanding.    All questions and concerns answered.                 Gynecologic Oncology   [address] [place] [address] [address] [place] [id number] [phone] office

## 2023-09-18 ENCOUNTER — SPECIALTY PHARMACY (OUTPATIENT)
Dept: PHARMACY | Facility: HOSPITAL | Age: 63
End: 2023-09-18
Payer: MEDICARE

## 2023-09-18 ENCOUNTER — SPECIALTY PHARMACY (OUTPATIENT)
Dept: ENDOCRINOLOGY | Age: 63
End: 2023-09-18
Payer: MEDICARE

## 2023-09-18 NOTE — PROGRESS NOTES
Specialty Pharmacy Patient Management Program  Endocrinology Reassessment     Joanne Contreras is a 63 y.o. female seen by an Endocrinology provider for Type 2 Diabetes and enrolled in the Endocrinology Patient Management program offered by [facility].  A follow-up outreach was conducted, including assessment of continued therapy appropriateness, medication adherence, and side effect incidence and management for FreeStyle Dori 2 CGM.    Changes to Insurance Coverage or Financial Support  No changes    Relevant Past Medical History and Comorbidities  Relevant medical history and concomitant health conditions were discussed with the patient. The patient's chart has been reviewed for relevant past medical history and comorbid health conditions and updated as necessary.   Past Medical History:   Diagnosis Date    Anemia     Coronary artery disease     Diabetes mellitus     Hyperlipidemia     Hypertension     Peripheral vascular disease, secondary     Stroke      Social History     Socioeconomic History    Marital status:    Tobacco Use    Smoking status: Former     Types: Cigarettes     Quit date:      Years since quittin.7    Smokeless tobacco: Never   Vaping Use    Vaping Use: Never used   Substance and Sexual Activity    Alcohol use: No    Drug use: Never    Sexual activity: Defer     Problem list reviewed by Brandi Box, PharmD on 2023 at 10:05 AM    Hospitalizations and Urgent Care Since Last Assessment  ED Visits, Admissions, or Hospitalizations: None  Urgent Office Visits: None    Allergies  Known allergies and reactions were discussed with the patient. The patient's chart has been reviewed for allergy information and updated as necessary.   Allergies   Allergen Reactions    Beta Adrenergic Blockers Other (See Comments)     Make PAD pain / claudication worse-   Pt could not recall 23-currently taking carvedilol          Relevant Laboratory Values  A1C  Last 3 Results          4/5/2023    14:37   HGBA1C Last 3 Results   Hemoglobin A1C 8.90      Lab Results   Component Value Date    HGBA1C 8.90 (H) 04/05/2023     Lab Results   Component Value Date    GLUCOSE 270 (H) 08/09/2023    CALCIUM 8.7 08/09/2023     08/09/2023    K 4.0 08/09/2023    CO2 22.0 08/09/2023     08/09/2023    BUN 12 08/09/2023    CREATININE 0.77 08/09/2023    EGFRIFAFRI 82 06/12/2020    EGFRIFNONA 71 06/12/2020    BCR 15.6 08/09/2023    ANIONGAP 15.0 08/09/2023     Lab Results   Component Value Date    CHLPL 248 (H) 04/05/2023    TRIG 268 (H) 04/05/2023    HDL 36 (L) 04/05/2023     (H) 04/05/2023       Current Medication List  This medication list has been reviewed with the patient and evaluated for any interactions or necessary modifications/recommendations, and updated to include all prescription medications, OTC medications, and supplements the patient is currently taking.  This list reflects what is contained in the patient's profile, which has also been marked as reviewed to communicate to other providers it is the most up to date version of the patient's current medication therapy.     Current Outpatient Medications:     senna (SENOKOT) 8.6 MG tablet tablet, Take  by mouth As Needed., Disp: , Rfl:     aspirin 81 MG chewable tablet, Chew 1 tablet Daily., Disp: , Rfl:     atorvastatin (LIPITOR) 20 MG tablet, Take 1 tablet by mouth Daily., Disp: 90 tablet, Rfl: 1    carvedilol (COREG) 3.125 MG tablet, Take 1 tablet by mouth 2 (Two) Times a Day. (Patient not taking: Reported on 9/15/2023), Disp: 180 tablet, Rfl: 2    carvedilol (COREG) 6.25 MG tablet, Take 1 tablet by mouth 2 (two) times daily with meals., Disp: 60 tablet, Rfl: 3    carvedilol (COREG) 6.25 MG tablet, Take 1 tablet by mouth 2 (Two) Times a Day With Meals., Disp: 60 tablet, Rfl: 5    Continuous Blood Gluc  (FreeStyle Dori 2 [place]) device, 1 Device Daily., Disp: 1 each, Rfl: PRN    Continuous Blood Gluc  Sensor (FreeStyle Dori 2 Sensor) misc, Apply 1 sensor to the appropriate area as directed Every 14 (Fourteen) Days., Disp: 6 each, Rfl: PRN    docusate sodium (COLACE) 100 MG capsule, Take 1 capsule by mouth 2 (Two) Times a Day. (Patient not taking: Reported on 8/9/2023), Disp: , Rfl:     gabapentin (NEURONTIN) 300 MG capsule, Take 1 capsule by mouth 3 (Three) Times a Day. (Patient taking differently: Take 1 capsule by mouth As Needed.), Disp: 90 capsule, Rfl: 3    Insulin Glargine (Lantus SoloStar) 100 UNIT/ML injection pen, Inject 60 Units under the skin into the appropriate area as directed Every Morning., Disp: 30 mL, Rfl: 2    Insulin Lispro, 1 Unit Dial, (HumaLOG KwikPen) 100 UNIT/ML solution pen-injector, Inject 15 Units under the skin Three Times Daily With Breakfast, Lunch & Dinner. Add units for correction based on glucose and provided scale. Max 81 units per day., Disp: 90 mL, Rfl: 3    Insulin Pen Needle (BD Pen Needle Avril 2nd Gen) 32G X 4 MM misc, Use 1 Pen Needle 4 (Four) Times a Day., Disp: 400 each, Rfl: PRN    lisinopril (PRINIVIL,ZESTRIL) 20 MG tablet, Take 1 tablet by mouth Daily. (Patient not taking: Reported on 9/15/2023), Disp: 30 tablet, Rfl: 3    Multiple Vitamins-Minerals (MULTIVITAMIN WITH MINERALS) tablet tablet, Take 1 tablet by mouth Daily., Disp: , Rfl:     Niraparib Tosylate (ZEJULA) 100 MG tablet, Take 1 tablet by mouth Daily., Disp: 30 tablet, Rfl: 5    O2 (OXYGEN), Inhale 2 L/min 1 (One) Time. (Patient not taking: Reported on 4/12/2023), Disp: , Rfl:     OLANZapine (ZyPREXA) 5 MG tablet, Take 1 tablet by mouth Every Night. Take on days 2, 3 and 4 after chemotherapy. (Patient not taking: Reported on 3/17/2023), Disp: 3 tablet, Rfl: 5    ondansetron (ZOFRAN) 8 MG tablet, Take 1 tablet by mouth 30 minutes prior to cancer treatment daily and every 8 hours as needed for breakthrough nausea, Disp: 60 tablet, Rfl: 5  No current facility-administered medications for this  visit.    Facility-Administered Medications Ordered in Other Visits:     heparin injection 500 Units, 500 Units, Intravenous, PRN, Takimoto, Jelly L, DO, 500 Units at 02/03/23 0908    sodium chloride 0.9 % flush 10 mL, 10 mL, Intravenous, PRN, Takimoto, Jelly L, DO, 10 mL at 02/03/23 0908    Medicines reviewed by Brandi Box, Enedelia on 9/18/2023 at 10:02 AM    Drug Interactions  No significant DDIs identified    Recommended Medications Assessment  Aspirin: Currently Taking   Statin: Currently Taking   ACEi/ARB: Currently Taking     Adverse Drug Reactions  Medication tolerability: Tolerating with no to minimal ADRs  Medication plan: Continue therapy with normal follow-up  Plan for ADR Management: N/A    Adherence, Self-Administration, and Current Therapy Problems  Adherence related to the patient's specialty therapy was discussed with the patient. The Adherence segment of this outreach has been reviewed and updated.     Adherence Questions  Medication(s) assessed: FreeStyle Dori 2  On average, how many doses/injections does the patient miss per month?: 0  What are the identified reasons for non-adherence or missed doses? : no problems identfied  What is the estimated medication adherence level?: %  Based on the patient/caregiver response and refill history, does this patient require an MTP to track adherence improvements?: no    Additional Barriers to Patient Self-Administration: Pt fearful of hypoglycemia so not taking her insulin like she should.   Methods for Supporting Patient Self-Administration: Had pt make an appointment to address need for better glucose control and adjust insulin regimen.     Open Medication Therapy Problems  Diabetes mellitus    Current Medication: Insulin Glargine (Lantus SoloStar) 100 UNIT/ML injection pen   Rationale: Patient prefers not to take - Adherence - Adherence   Recommendation: Provide Adherence Intervention   Note: Pt reports persistent high blood sugars but also  "said she does not take her insulin as directed and that she \"fady just does what she wants to do with it\". She reports this is because of fear of hypoglycemia which she has experienced a few times. She does not giver her basal insulin until blood sugar is high and then gives somewhere around half of the prescribed dose and Humalog is often given after the meal depending on what blood sugar is. Explained intention of basal insulin is to control and prevent hyperglycemia throughout the whole day and Humalog is meant to cover what is eaten at meal times to prevent high blood sugar after meals. Strongly recommended patient make an appointment to be seen to fine insulin adjustments to help her gain better control and provide further education. Pt expressed reason for not having an appointment was dissatisfaction with last provider seen, but agreed to make an appointment with new provider. Appt made for 10/12/23. Will leave MTP open and follow up with patient after her appointment and insulin adjustments made.             Goals of Therapy  Goals related to the patient's specialty therapy were discussed with the patient. The Patient Goals segment of this outreach has been reviewed and updated.   Goals Addressed Today        Specialty Pharmacy General Goal      Reduce HbA1c <8%      Lab Results   Component Value Date    HGBA1C 8.90 (H) 04/05/2023    HGBA1C 8.40 (H) 09/19/2022    HGBA1C 8.2 (H) 07/30/2022    HGBA1C 8.2 (H) 07/29/2022    HGBA1C 9.1 (H) 06/12/2020                 Quality of Life Assessment   Quality of Life related to the patient's enrollment in the patient management program and services provided was discussed with the patient. The QOL segment of this outreach has been reviewed and updated.  Quality of Life Improvement Scale: No change    Reassessment Plan & Follow-Up  1. Medication Therapy Changes: Pt continue utilizing FreeStyle Dori 2 glucose monitor, but reports consistently high blood sugars. Pt also " admits that she does not take her insulin as prescribed due to fear of hypoglycemia, so she is self-adjusting her insulin doses and giving only when noticing high blood sugars. Provided education on importance of both basal and bolus insulin and the mechanism behind both. Pt has not been seen since April and no future appointment made so urged patient to make an appointment to be seen and have insulin adjustments made. Pt scheduled appointment for 10/12/23. Will follow-up after appointment to see how she is doing.   2. Related Plans, Therapy Recommendations, or Issues to Be Addressed: None  3. Pharmacist to perform regular assessments no more than (6) months from the previous assessment.  4. Care Coordinator to set up future refill outreaches, coordinate prescription delivery, and escalate clinical questions to pharmacist.    Attestation  Therapeutic appropriateness: Appropriate   I attest the patient was actively involved in and has agreed to the above plan of care.  If the prescribed therapy is at any point deemed not appropriate based on the current or future assessments, a consultation will be initiated with the patient's specialty care provider to determine the best course of action. The revised plan of therapy will be documented along with any required assessments and/or additional patient education provided.     Brandi Box, PharmD, BCPS, BCCCP  Clinical Specialty Pharmacist, Endocrinology  9/18/2023  10:18 EDT

## 2023-09-18 NOTE — PROGRESS NOTES
Specialty Pharmacy Refill Coordination Note     Joanne is a 63 y.o. female contacted today regarding refills of  Zejula specialty medication(s).    Reviewed and verified with patient:       Specialty medication(s) and dose(s) confirmed: yes    Refill Questions      Flowsheet Row Most Recent Value   Changes to allergies? No   Changes to medications? No   New conditions since last clinic visit No   Unplanned office visit, urgent care, ED, or hospital admission in the last 4 weeks  No   How does patient/caregiver feel medication is working? Good   Financial problems or insurance changes  No   Since the previous refill, were any specialty medication doses or scheduled injections missed or delayed?  No   Does this patient require a clinical escalation to a pharmacist? No            Delivery Questions      Flowsheet Row Most Recent Value   Delivery method Other (Comment)  [[employer] to pt home ship 9/21 deliver 9/22]   Delivery address correct? Yes   Preferred delivery time? Anytime   Number of medications in delivery 1   Medication being filled and delivered Zejula   Doses left of specialty medications 13 days   Is there any medication that is due not being filled? No   Supplies needed? No supplies needed   Cooler needed? No   Do any medications need mixed or dated? No   Copay form of payment Payment plan already set up   Questions or concerns for the pharmacist? No   Are any medications first time fills? No                   Follow-up: 3 week(s)     Adrienne Beatty  Specialty Pharmacy Technician

## 2023-10-09 ENCOUNTER — TELEPHONE (OUTPATIENT)
Dept: ENDOCRINOLOGY | Age: 63
End: 2023-10-09

## 2023-10-10 ENCOUNTER — SPECIALTY PHARMACY (OUTPATIENT)
Dept: PHARMACY | Facility: HOSPITAL | Age: 63
End: 2023-10-10
Payer: MEDICARE

## 2023-10-10 NOTE — PROGRESS NOTES
Pt is disenrolled from the Oncology program.  She was transferred to the [facility] due to Dr Montalvo no longer working at [facility].      Adrienne Beatty  Specialty Pharmacy Technician

## 2023-10-12 ENCOUNTER — OFFICE VISIT (OUTPATIENT)
Dept: ENDOCRINOLOGY | Age: 63
End: 2023-10-12
Payer: MEDICARE

## 2023-10-12 VITALS
BODY MASS INDEX: 32.31 KG/M2 | TEMPERATURE: 96.8 F | HEART RATE: 59 BPM | WEIGHT: 134 LBS | SYSTOLIC BLOOD PRESSURE: 126 MMHG | OXYGEN SATURATION: 95 % | DIASTOLIC BLOOD PRESSURE: 70 MMHG

## 2023-10-12 DIAGNOSIS — Z79.4 TYPE 2 DIABETES MELLITUS WITH HYPERGLYCEMIA, WITH LONG-TERM CURRENT USE OF INSULIN: Primary | ICD-10-CM

## 2023-10-12 DIAGNOSIS — Z86.79 HISTORY OF CAD (CORONARY ARTERY DISEASE): ICD-10-CM

## 2023-10-12 DIAGNOSIS — E11.65 TYPE 2 DIABETES MELLITUS WITH HYPERGLYCEMIA, WITH LONG-TERM CURRENT USE OF INSULIN: Primary | ICD-10-CM

## 2023-10-12 DIAGNOSIS — I73.9 PAD (PERIPHERAL ARTERY DISEASE): ICD-10-CM

## 2023-10-12 DIAGNOSIS — E11.42 DIABETIC PERIPHERAL NEUROPATHY ASSOCIATED WITH TYPE 2 DIABETES MELLITUS: ICD-10-CM

## 2023-10-12 DIAGNOSIS — R80.9 PROTEINURIA, UNSPECIFIED TYPE: ICD-10-CM

## 2023-10-12 PROCEDURE — 95251 CONT GLUC MNTR ANALYSIS I&R: CPT | Performed by: NURSE PRACTITIONER

## 2023-10-12 PROCEDURE — 3052F HG A1C>EQUAL 8.0%<EQUAL 9.0%: CPT | Performed by: NURSE PRACTITIONER

## 2023-10-12 PROCEDURE — 3078F DIAST BP <80 MM HG: CPT | Performed by: NURSE PRACTITIONER

## 2023-10-12 PROCEDURE — 99214 OFFICE O/P EST MOD 30 MIN: CPT | Performed by: NURSE PRACTITIONER

## 2023-10-12 PROCEDURE — 3074F SYST BP LT 130 MM HG: CPT | Performed by: NURSE PRACTITIONER

## 2023-10-12 NOTE — PROGRESS NOTES
"Chief Complaint  Diabetes (Type 2: Pt luana is attached, is not up to date on eye exam, no hx of retinopathy or neuropathy. )    Subjective        Joanne Contreras presents to [facility]  History of Present Illness    advanced metastatic malignancy: fallopian tubes    S/p chemo  On daily Zejula (increased serum glucose (66%)      Type 2 DM   Hx DM since early 1990s  Started insulin when hospitalized at some point, not sure what year.     Lantus: should be on 60u QD- reports she takes a different dose every day and at different time and can't even really articulate how much she is taking    Humalog: should be on 15u before meals, reports the most she takes is 7u but really can't articulate how much she takes       Known Complications:  + neuropathy  +proteinuria   +PAD: s/p L AKA and R BKA   + CAD  Last eye exam: n/a    Cgm:  Very high 18%  High 50%  Target range 32%  0%  GMI 8.2%  Average glucose 205       Objective   Vital Signs:  /70   Pulse 59   Temp 96.8 øF (36 øC) (Temporal)   Wt 60.8 kg (134 lb) Comment: patient provided  SpO2 95%   BMI 32.31 kg/mý   Estimated body mass index is 32.31 kg/mý as calculated from the following:    Height as of 11/9/22: 137.2 cm (54\").    Weight as of this encounter: 60.8 kg (134 lb).             Physical Exam  Vitals reviewed.   Constitutional:       General: She is not in acute distress.  HENT:      Head: Normocephalic and atraumatic.   Cardiovascular:      Rate and Rhythm: Normal rate and regular rhythm.   Pulmonary:      Effort: Pulmonary effort is normal. No respiratory distress.   Musculoskeletal:         General: Deformity and signs of injury present. Normal range of motion.      Cervical back: Normal range of motion and neck supple.      Comments: EUGENE RAMIREZ   Skin:     General: Skin is warm and dry.   Neurological:      Mental Status: She is alert and oriented to person, place, and time. Mental status is at baseline.      Motor: " Weakness present.      Gait: Gait abnormal.      Comments: Wheelchair    Psychiatric:         Mood and Affect: Mood normal.         Behavior: Behavior normal.         Thought Content: Thought content normal.         Judgment: Judgment normal.          Result Review :  The following data was reviewed by: TODD Caro on 10/12/2023:  Common labs          4/5/2023    14:37 6/7/2023    10:09 8/9/2023    09:26   Common Labs   Glucose  197  270    BUN  21  12    Creatinine  0.75  0.77    Sodium  140  139    Potassium  4.2  4.0    Chloride  104  102    Calcium  9.7  8.7    Albumin  3.6  3.4    Total Bilirubin  0.3  0.2    Alkaline Phosphatase  127  116    AST (SGOT)  25  25    ALT (SGPT)  16  11    WBC  6.53  5.53    Hemoglobin  12.9  13.9    Hematocrit  41.7  43.3    Platelets  167  134    Total Cholesterol 248      Triglycerides 268      HDL Cholesterol 36      LDL Cholesterol  162      Hemoglobin A1C 8.90                     Assessment and Plan   Diagnoses and all orders for this visit:    1. Type 2 diabetes mellitus with hyperglycemia, with long-term current use of insulin (Primary)    2. Diabetic peripheral neuropathy associated with type 2 diabetes mellitus    3. Proteinuria, unspecified type    4. PAD (peripheral artery disease)    5. History of CAD (coronary artery disease)             Follow Up   Return in about 4 weeks (around 11/9/2023).    Return to clinic in 4 weeks with detailed log- BS, time, insulin dose and meal  Change lantus to same time every day, start with 10u and adjust every 4 days to goal fasting BS < 150  Keeps meals at 30-45g/ carbs and snacks 15g  High risk of  complications given her history  In 4 weeks will review log together and make adjustments to her insulin routine   Cgm reviewed, GMI not far off from target A1c < 8% with conservative management     Patient was given instructions and counseling regarding her condition or for health maintenance advice. Please see specific  information pulled into the AVS if appropriate.     TODD Caro

## 2023-10-24 ENCOUNTER — SPECIALTY PHARMACY (OUTPATIENT)
Dept: PHARMACY | Facility: HOSPITAL | Age: 63
End: 2023-10-24
Payer: MEDICARE

## 2023-11-08 ENCOUNTER — SPECIALTY PHARMACY (OUTPATIENT)
Dept: ENDOCRINOLOGY | Age: 63
End: 2023-11-08
Payer: MEDICARE

## 2023-11-08 NOTE — PROGRESS NOTES
Specialty Pharmacy Refill Coordination Note     Joanne is a 63 y.o. female contacted today regarding refills of  3 specialty medication(s).    Reviewed and verified with patient:       Specialty medication(s) and dose(s) confirmed: yes        Delivery Questions      Flowsheet Row Most Recent Value   Delivery method FedEx  [FED EX SHIP 11/8/23 DELIVERY 11/9/23]   Delivery address correct? Yes   Delivery phone number [phone]   Preferred delivery time? Anytime   Medication(s) being filled and delivered Continuous Blood Gluc Sensor, Insulin Pen Needle, Insulin Glargine   Doses left of specialty medications N/A   Is there any medication that is due not being filled? Yes   Why are other medications not being filled? CARVEDILOL, ATORVASTATIN, LISINOPRIL   Supplies needed? No supplies needed   Do any medications need mixed or dated? No   Questions or concerns for the pharmacist? No   Explain any questions or concerns for the pharmacist $0   Are any medications first time fills? No   Tracking number for delivery N/A   Shipment status Cooler packed                   Follow-up: 43 day(s)     Kamini Kaye, Pharmacy Technician  Specialty Pharmacy Technician

## 2023-11-16 ENCOUNTER — SPECIALTY PHARMACY (OUTPATIENT)
Dept: ENDOCRINOLOGY | Age: 63
End: 2023-11-16
Payer: MEDICARE

## 2023-11-16 NOTE — PROGRESS NOTES
Specialty Pharmacy Refill Coordination Note     Joanne is a 63 y.o. female contacted today regarding refills of  1 specialty medication(s).    Reviewed and verified with patient:       Specialty medication(s) and dose(s) confirmed: yes        Delivery Questions      Flowsheet Row Most Recent Value   Delivery method FedEx  [FED EX SHIP 11/17/23 DELIVERY 11/20/23]   Delivery address correct? Yes   Delivery phone number [phone]   Preferred delivery time? Anytime   Number of medications in delivery 1   Medication(s) being filled and delivered Carvedilol   Doses left of specialty medications N/A   Is there any medication that is due not being filled? No   Why are other medications not being filled? N/A   Supplies needed? No supplies needed   Cooler needed? No   Do any medications need mixed or dated? No   Additional comments $0   Questions or concerns for the pharmacist? No   Explain any questions or concerns for the pharmacist N/A   Are any medications first time fills? No   Tracking number for delivery N/A                   Follow-up: 83 day(s)     Kamini Kaye, Pharmacy Technician  Specialty Pharmacy Technician

## 2023-11-27 ENCOUNTER — TELEPHONE (OUTPATIENT)
Dept: PHARMACY | Facility: HOSPITAL | Age: 63
End: 2023-11-27
Payer: MEDICARE

## 2023-11-27 NOTE — TELEPHONE ENCOUNTER
Patient called [place] office today to request refill of Zejula.  Patient states she is not scheduled for initial assessment with [facility] until 12/12 and has only 6 days left of medication.  Informed patient I would have to check with management re:sending refill since Dr. Montalvo is no longer with [facility].  Discussed case with clinic manager and specialty pharmacy manager.  Since patient has not been seen by a provider and no labs checked since September, we are unable to fill prescription at this time.  Called patient's preferred line and home phone but unable to leave a message.  Will attempt to call patient again tomorrow to update her.     Eb Hastings, PharmD, BCOP  Clinical Oncology Pharmacist

## 2023-12-13 ENCOUNTER — SPECIALTY PHARMACY (OUTPATIENT)
Dept: ENDOCRINOLOGY | Age: 63
End: 2023-12-13
Payer: MEDICARE

## 2023-12-13 NOTE — PROGRESS NOTES
Specialty Pharmacy Refill Coordination Note     Joanne is a 63 y.o. female contacted today regarding refills of  1 specialty medication(s).    Reviewed and verified with patient:       Specialty medication(s) and dose(s) confirmed: yes        Delivery Questions      Flowsheet Row Most Recent Value   Delivery method FedEx  [SHIP 12/18/23 DELIVERY 12/19/23]   Delivery address verified with patient/caregiver? Yes   Delivery address Home   Number of medications in delivery 1   Medication(s) being filled and delivered Atorvastatin Calcium (Hmg Coa Reductase Inhibitors)   Doses left of specialty medications N/A   Copay verified? Yes   Copay amount $0                   Follow-up: 83 day(s)     Kamini Kaye, Pharmacy Technician  Specialty Pharmacy Technician

## 2024-01-03 ENCOUNTER — SPECIALTY PHARMACY (OUTPATIENT)
Dept: ENDOCRINOLOGY | Age: 64
End: 2024-01-03
Payer: MEDICARE

## 2024-01-03 NOTE — PROGRESS NOTES
Specialty Pharmacy Refill Coordination Note     Joanne is a 63 y.o. female contacted today regarding refills of  3 specialty medication(s).    Reviewed and verified with patient:       Specialty medication(s) and dose(s) confirmed: yes        Delivery Questions      Flowsheet Row Most Recent Value   Delivery method --  [SHIP 1/4/24 DELIVERY 1/5/24]   Delivery address verified with patient/caregiver? Yes   Delivery address Home   Number of medications in delivery 3   Medication(s) being filled and delivered Insulin Glargine, Insulin Lispro, Insulin Pen Needle   Doses left of specialty medications N/A   Copay verified? Yes   Copay amount $0   Copay form of payment No copayment ($0)                   Follow-up: 43 day(s)     Kamini Kaye, Pharmacy Technician  Specialty Pharmacy Technician

## (undated) DEVICE — SUT PDS 2/0 SH 27IN Z317H

## (undated) DEVICE — NDL HYPO PRECISIONGLIDE REG 25G 1 1/2

## (undated) DEVICE — CVR PROB 96IN LF STRL

## (undated) DEVICE — APPL CHLORAPREP HI/LITE 26ML ORNG

## (undated) DEVICE — INTENDED FOR TISSUE SEPARATION, AND OTHER PROCEDURES THAT REQUIRE A SHARP SURGICAL BLADE TO PUNCTURE OR CUT.: Brand: BARD-PARKER ® CARBON RIB-BACK BLADES

## (undated) DEVICE — TRAP FLD MINIVAC MEGADYNE 100ML

## (undated) DEVICE — PATIENT RETURN ELECTRODE, SINGLE-USE, CONTACT QUALITY MONITORING, ADULT, WITH 9FT CORD, FOR PATIENTS WEIGING OVER 33LBS. (15KG): Brand: MEGADYNE

## (undated) DEVICE — ADHS SKIN SURG TISS VISC PREMIERPRO EXOFIN HI/VISC FAST/DRY

## (undated) DEVICE — DRP C/ARM 41X74IN

## (undated) DEVICE — ANTIBACTERIAL UNDYED BRAIDED (POLYGLACTIN 910), SYNTHETIC ABSORBABLE SUTURE: Brand: COATED VICRYL

## (undated) DEVICE — GLV SURG PREMIERPRO ORTHO LTX PF SZ7.5 BRN

## (undated) DEVICE — SUT MNCRYL PLS ANTIB UD 4/0 PS2 18IN

## (undated) DEVICE — LOU MINOR PROCEDURE: Brand: MEDLINE INDUSTRIES, INC.

## (undated) DEVICE — TBG PENCL TELESCP MEGADYNE SMOKE EVAC 10FT